# Patient Record
Sex: FEMALE | Race: OTHER | Employment: OTHER | URBAN - METROPOLITAN AREA
[De-identification: names, ages, dates, MRNs, and addresses within clinical notes are randomized per-mention and may not be internally consistent; named-entity substitution may affect disease eponyms.]

---

## 2017-01-03 ENCOUNTER — TELEPHONE (OUTPATIENT)
Dept: FAMILY MEDICINE CLINIC | Facility: CLINIC | Age: 77
End: 2017-01-03

## 2017-01-03 DIAGNOSIS — E11.9 TYPE 2 DIABETES MELLITUS WITHOUT COMPLICATION, WITHOUT LONG-TERM CURRENT USE OF INSULIN (HCC): Primary | ICD-10-CM

## 2017-01-03 NOTE — TELEPHONE ENCOUNTER
Copied from [address] [facility] & eEvent [id number]: Confirmed from pts' daughter Kathrine Vaughn pt needs freestyle lite test strips and send to THE [facility] mail delivery in [place], [place]. Freestyle test strips refilled for 3 months per [facility] refill protocol for bid testing.

## 2017-01-09 RX ORDER — PAROXETINE 10 MG/1
TABLET, FILM COATED ORAL
Qty: 90 TABLET | Refills: 0 | OUTPATIENT
Start: 2017-01-09

## 2017-01-09 RX ORDER — METOPROLOL SUCCINATE 50 MG/1
TABLET, EXTENDED RELEASE ORAL
Qty: 90 TABLET | Refills: 0 | OUTPATIENT
Start: 2017-01-09

## 2017-01-09 NOTE — TELEPHONE ENCOUNTER
Paroxetine and metoprolol approved by A on 12/21/16 for 3 month supply. Refill request denied at this time as refill too soon.

## 2017-01-16 ENCOUNTER — TELEPHONE (OUTPATIENT)
Dept: GASTROENTEROLOGY | Facility: CLINIC | Age: 77
End: 2017-01-16

## 2017-01-23 VITALS
DIASTOLIC BLOOD PRESSURE: 51 MMHG | TEMPERATURE: 97 F | BODY MASS INDEX: 24.19 KG/M2 | OXYGEN SATURATION: 97 % | WEIGHT: 120 LBS | HEART RATE: 59 BPM | RESPIRATION RATE: 18 BRPM | HEIGHT: 59 IN | SYSTOLIC BLOOD PRESSURE: 152 MMHG

## 2017-01-23 PROCEDURE — 93005 ELECTROCARDIOGRAM TRACING: CPT

## 2017-01-23 PROCEDURE — 93010 ELECTROCARDIOGRAM REPORT: CPT | Performed by: EMERGENCY MEDICINE

## 2017-01-24 ENCOUNTER — HOSPITAL ENCOUNTER (EMERGENCY)
Facility: HOSPITAL | Age: 77
Discharge: HOME | End: 2017-01-24
Payer: MEDICARE

## 2017-01-24 NOTE — ED INITIAL ASSESSMENT (HPI)
Near syncopal episode this evening- was caught by family member. C/O pressure to top of head and dizziness. Patient is A/O X4.

## 2017-01-26 ENCOUNTER — TELEPHONE (OUTPATIENT)
Dept: INTERNAL MEDICINE CLINIC | Facility: CLINIC | Age: 77
End: 2017-01-26

## 2017-01-26 NOTE — TELEPHONE ENCOUNTER
Spoke with daughter Sylvia Davis (Mahendra Bell verified)  who states in beginning of January mother was admitted to a hospital in [facility] due to a dizzy spell, stomach ache and nausea. She was diagnosed at that time with Colitis and treated with IV antibiotics.  States h

## 2017-01-26 NOTE — TELEPHONE ENCOUNTER
Attempted to call daughter back. [facility], please transfer to [id number] until 5pm today, otherwise [id number] anytime.

## 2017-01-26 NOTE — TELEPHONE ENCOUNTER
Spoke with the patient's daughter Vy Park who scheduled appt for next week and will obtain medical records from the hospital

## 2017-01-26 NOTE — TELEPHONE ENCOUNTER
----- Message from [address] [place] [address] [address] Generic sent at 1/24/2017  8:17 AM CST -----  Regarding: Other  Contact: [phone]  Marlee Maynard,  This is mustapha. Mom has not been feeling good .  she is complaining of stomach ache and she lost her balance twice yesterday sh

## 2017-02-03 ENCOUNTER — TELEPHONE (OUTPATIENT)
Dept: FAMILY MEDICINE CLINIC | Facility: CLINIC | Age: 77
End: 2017-02-03

## 2017-02-03 ENCOUNTER — APPOINTMENT (OUTPATIENT)
Dept: PHYSICAL THERAPY | Facility: HOSPITAL | Age: 77
End: 2017-02-03
Attending: FAMILY MEDICINE
Payer: MEDICARE

## 2017-02-03 NOTE — TELEPHONE ENCOUNTER
Daughter states pt has PT appt this afternoon at [facility]  Physical therapy dept said they do not have order  Daughter said there is  referral from Dr Ivanna Patrick- see 12/21 referral    Daughter would like callback to confirm order sent

## 2017-02-06 ENCOUNTER — PATIENT MESSAGE (OUTPATIENT)
Dept: FAMILY MEDICINE CLINIC | Facility: CLINIC | Age: 77
End: 2017-02-06

## 2017-02-06 DIAGNOSIS — Z91.81 RISK FOR FALLS: Primary | ICD-10-CM

## 2017-02-06 DIAGNOSIS — R29.898 MUSCULAR DECONDITIONING: ICD-10-CM

## 2017-02-07 ENCOUNTER — APPOINTMENT (OUTPATIENT)
Dept: PHYSICAL THERAPY | Facility: HOSPITAL | Age: 77
End: 2017-02-07
Attending: FAMILY MEDICINE
Payer: MEDICARE

## 2017-02-07 ENCOUNTER — TELEPHONE (OUTPATIENT)
Dept: PHYSICAL THERAPY | Facility: HOSPITAL | Age: 77
End: 2017-02-07

## 2017-02-07 NOTE — TELEPHONE ENCOUNTER
Spoke with nurse at Dr. Jhonatan Malik office regarding patient's recent ER visits for dizziness and falls and requested updated order with clearance to start PT as patient's current order is dated prior to ER visits.  Per nurse, Dr. Nain Huff would like to see patient

## 2017-02-07 NOTE — TELEPHONE ENCOUNTER
Per AMA it will be okay for the pt to start PT prior to the [address] on 2/22/17. Okay to use current PT referral. Daughter notified and PT office aware.  Pt is scheduled for 2/10/17 at [facility]

## 2017-02-10 ENCOUNTER — OFFICE VISIT (OUTPATIENT)
Dept: PHYSICAL THERAPY | Facility: HOSPITAL | Age: 77
End: 2017-02-10
Attending: FAMILY MEDICINE
Payer: MEDICARE

## 2017-02-10 DIAGNOSIS — Z91.81 RISK FOR FALLS: Primary | ICD-10-CM

## 2017-02-10 DIAGNOSIS — R29.898 MUSCULAR DECONDITIONING: ICD-10-CM

## 2017-02-10 DIAGNOSIS — S76.211A GROIN STRAIN, RIGHT, INITIAL ENCOUNTER: ICD-10-CM

## 2017-02-10 PROCEDURE — 97163 PT EVAL HIGH COMPLEX 45 MIN: CPT

## 2017-02-10 NOTE — PROGRESS NOTES
LOWER EXTREMITY EVALUATION:   Referring Physician: Dr. Lisa Addison  Date of Onset: 3-4 months ago Date of Service: 2/10/2017   Diagnosis: Risk for falls; muscular deconditioning     PATIENT SUMMARY:   Sunday Ramsey is a 77year old y/o female who presents to the with ambulation, sitting/rising from sitting, negotiating stairs, and performing household chores. Aneta Ridley would benefit from skilled Physical Therapy to address the above impairments to allow for improved functional mobility with decreased risk for falls. instruction    Education or treatment limitation: None  Rehab Potential:good    Current status G Code: Initial Mobility: Walking and Moving Around CK: 40-59% impaired, limited, or restricted  Goal status G Code:  Mobility: Walking and Moving Around CJ: 20-3

## 2017-02-10 NOTE — TELEPHONE ENCOUNTER
From: Elian Eng  To: Rumaldo Favre, MD  Sent: 2/6/2017 12:21 PM CST  Subject: Non-Urgent Medical Question    there was a new order request for the physical therapy on friday , and i was wondering if it was processed

## 2017-02-14 ENCOUNTER — OFFICE VISIT (OUTPATIENT)
Dept: PHYSICAL THERAPY | Facility: HOSPITAL | Age: 77
End: 2017-02-14
Attending: FAMILY MEDICINE
Payer: MEDICARE

## 2017-02-14 DIAGNOSIS — S76.211A GROIN STRAIN, RIGHT, INITIAL ENCOUNTER: ICD-10-CM

## 2017-02-14 DIAGNOSIS — Z91.81 RISK FOR FALLS: Primary | ICD-10-CM

## 2017-02-14 DIAGNOSIS — R29.898 MUSCULAR DECONDITIONING: ICD-10-CM

## 2017-02-14 PROCEDURE — 97110 THERAPEUTIC EXERCISES: CPT

## 2017-02-14 NOTE — PATIENT INSTRUCTIONS
1. Pt to perform home exercise program as instructed and notify therapist if any pain is experienced. 2. Pt to use lumbar support for sitting.

## 2017-02-14 NOTE — PROGRESS NOTES
Diagnosis: Risk for falls; muscular deconditioning     Authorized # of Visits:  2/7         Next MD visit: none scheduled  Fall Risk: standard         Precautions: n/a           Medication Changes since last visit?: No  Subjective: Pts son Hammadevelyn Rosy to interpr

## 2017-02-16 ENCOUNTER — OFFICE VISIT (OUTPATIENT)
Dept: PHYSICAL THERAPY | Facility: HOSPITAL | Age: 77
End: 2017-02-16
Attending: FAMILY MEDICINE
Payer: MEDICARE

## 2017-02-16 DIAGNOSIS — R29.898 MUSCULAR DECONDITIONING: ICD-10-CM

## 2017-02-16 DIAGNOSIS — S76.211A GROIN STRAIN, RIGHT, INITIAL ENCOUNTER: ICD-10-CM

## 2017-02-16 DIAGNOSIS — Z91.81 RISK FOR FALLS: Primary | ICD-10-CM

## 2017-02-16 PROCEDURE — 97110 THERAPEUTIC EXERCISES: CPT

## 2017-02-16 NOTE — PROGRESS NOTES
Diagnosis: Risk for falls; muscular deconditioning     Authorized # of Visits:  3/7         Next MD visit: none scheduled  Fall Risk: standard         Precautions: n/a           Medication Changes since last visit?: No  Subjective: Pts son Willis Dieudonne to interpr

## 2017-02-18 NOTE — TELEPHONE ENCOUNTER
Not sure about this  pls check with daughter  I believe she is already in Physical Therapy  I did see daughter for a vist recently and she mentioned mother getting Physical Therapy. Is this sorted out?

## 2017-02-21 ENCOUNTER — OFFICE VISIT (OUTPATIENT)
Dept: PHYSICAL THERAPY | Facility: HOSPITAL | Age: 77
End: 2017-02-21
Attending: FAMILY MEDICINE
Payer: MEDICARE

## 2017-02-21 DIAGNOSIS — S76.211A GROIN STRAIN, RIGHT, INITIAL ENCOUNTER: ICD-10-CM

## 2017-02-21 DIAGNOSIS — R29.898 MUSCULAR DECONDITIONING: ICD-10-CM

## 2017-02-21 DIAGNOSIS — Z91.81 RISK FOR FALLS: Primary | ICD-10-CM

## 2017-02-21 PROCEDURE — 97110 THERAPEUTIC EXERCISES: CPT

## 2017-02-22 ENCOUNTER — OFFICE VISIT (OUTPATIENT)
Dept: FAMILY MEDICINE CLINIC | Facility: CLINIC | Age: 77
End: 2017-02-22

## 2017-02-22 VITALS
DIASTOLIC BLOOD PRESSURE: 66 MMHG | SYSTOLIC BLOOD PRESSURE: 123 MMHG | WEIGHT: 116 LBS | RESPIRATION RATE: 18 BRPM | HEART RATE: 70 BPM | BODY MASS INDEX: 23 KG/M2 | TEMPERATURE: 97 F

## 2017-02-22 DIAGNOSIS — I10 ESSENTIAL HYPERTENSION WITH GOAL BLOOD PRESSURE LESS THAN 140/90: ICD-10-CM

## 2017-02-22 DIAGNOSIS — Z78.0 POSTMENOPAUSAL: ICD-10-CM

## 2017-02-22 DIAGNOSIS — E11.9 CONTROLLED TYPE 2 DIABETES MELLITUS WITHOUT COMPLICATION, WITHOUT LONG-TERM CURRENT USE OF INSULIN (HCC): Primary | ICD-10-CM

## 2017-02-22 DIAGNOSIS — Z91.81 RISK FOR FALLS: ICD-10-CM

## 2017-02-22 DIAGNOSIS — E78.00 HYPERCHOLESTEREMIA: ICD-10-CM

## 2017-02-22 DIAGNOSIS — M85.80 OSTEOPENIA, UNSPECIFIED LOCATION: ICD-10-CM

## 2017-02-22 PROCEDURE — 99214 OFFICE O/P EST MOD 30 MIN: CPT | Performed by: FAMILY MEDICINE

## 2017-02-22 PROCEDURE — G0463 HOSPITAL OUTPT CLINIC VISIT: HCPCS | Performed by: FAMILY MEDICINE

## 2017-02-22 RX ORDER — PRAVASTATIN SODIUM 40 MG
40 TABLET ORAL NIGHTLY
Qty: 90 TABLET | Refills: 1 | Status: SHIPPED | OUTPATIENT
Start: 2017-02-22 | End: 2017-09-27

## 2017-02-22 RX ORDER — LOSARTAN POTASSIUM 50 MG/1
50 TABLET ORAL 2 TIMES DAILY
Qty: 180 TABLET | Refills: 1 | Status: SHIPPED | OUTPATIENT
Start: 2017-02-22 | End: 2017-09-27

## 2017-02-22 RX ORDER — METOPROLOL SUCCINATE 50 MG/1
50 TABLET, EXTENDED RELEASE ORAL
Qty: 90 TABLET | Refills: 1 | Status: SHIPPED | OUTPATIENT
Start: 2017-02-22 | End: 2017-09-27

## 2017-02-22 RX ORDER — PAROXETINE 10 MG/1
10 TABLET, FILM COATED ORAL EVERY MORNING
Qty: 90 TABLET | Refills: 1 | Status: SHIPPED | OUTPATIENT
Start: 2017-02-22 | End: 2017-09-27

## 2017-02-23 ENCOUNTER — OFFICE VISIT (OUTPATIENT)
Dept: PHYSICAL THERAPY | Facility: HOSPITAL | Age: 77
End: 2017-02-23
Attending: FAMILY MEDICINE
Payer: MEDICARE

## 2017-02-23 DIAGNOSIS — R29.898 MUSCULAR DECONDITIONING: ICD-10-CM

## 2017-02-23 DIAGNOSIS — Z91.81 RISK FOR FALLS: Primary | ICD-10-CM

## 2017-02-23 DIAGNOSIS — S76.211A GROIN STRAIN, RIGHT, INITIAL ENCOUNTER: ICD-10-CM

## 2017-02-23 PROCEDURE — 97112 NEUROMUSCULAR REEDUCATION: CPT

## 2017-02-23 PROCEDURE — 97110 THERAPEUTIC EXERCISES: CPT

## 2017-02-23 NOTE — PROGRESS NOTES
HPI:    Patient ID: Kateryna Mitchell is a 77year old female. HPI Comments: Pt here for follow up. Diabetes- reports checking blood glucoses daily with readings ranging in the low 100's. Denies any highs or lows.  States she has changed eating habits, a daily with breakfast. Disp: 90 tablet Rfl: 0   PARoxetine HCl 10 MG Oral Tab Take 1 tablet (10 mg total) by mouth every morning. Disp: 90 tablet Rfl: 0   Metoprolol Succinate ER 50 MG Oral Tablet 24 Hr Take 1 tablet (50 mg total) by mouth once daily.  Disp: Instructed to keep feet clean and dry. 2. Essential hypertension with goal blood pressure less than 140/90  Bring in BP cuff to office to ensure reliability. Keep accurate record at home and if elevated recheck after taking medication.  Continue taking

## 2017-02-23 NOTE — TELEPHONE ENCOUNTER
Pt had OV with Dr. Samra Howard 2/22 and PT encounters noted this week. Issue appears resolved, closing encounter.

## 2017-02-23 NOTE — PROGRESS NOTES
Diagnosis: Risk for falls; muscular deconditioning     Authorized # of Visits:  5/7         Next MD visit: none scheduled  Fall Risk: standard         Precautions: n/a           Medication Changes since last visit?: No  Subjective: Patient reports no pain

## 2017-02-28 ENCOUNTER — OFFICE VISIT (OUTPATIENT)
Dept: PHYSICAL THERAPY | Facility: HOSPITAL | Age: 77
End: 2017-02-28
Attending: FAMILY MEDICINE
Payer: MEDICARE

## 2017-02-28 DIAGNOSIS — R29.898 MUSCULAR DECONDITIONING: ICD-10-CM

## 2017-02-28 DIAGNOSIS — Z91.81 RISK FOR FALLS: Primary | ICD-10-CM

## 2017-02-28 DIAGNOSIS — S76.211A GROIN STRAIN, RIGHT, INITIAL ENCOUNTER: ICD-10-CM

## 2017-02-28 PROCEDURE — 97112 NEUROMUSCULAR REEDUCATION: CPT

## 2017-02-28 PROCEDURE — 97110 THERAPEUTIC EXERCISES: CPT

## 2017-02-28 NOTE — PROGRESS NOTES
Patient Name: Rachid Segura: 5/10/1940, MRN: [id number]   Date:  2/28/2017  Referring Physician:  Ángela Mauricio    Diagnosis: Risk for falls     Discharge Summary    Pt has attended 6 visits in Physical Therapy.      Progress Note Start Date: 2/10/17 Dept: [phone].     Sincerely,  Shea Bonilla    Electronically signed by therapist: Shea Bonilla, PT         Diagnosis: Risk for falls; muscular deconditioning    Authorized # of Visits:  6/7         Next MD visit: none scheduled  Fall Risk: standard

## 2017-03-14 ENCOUNTER — HOSPITAL ENCOUNTER (OUTPATIENT)
Dept: BONE DENSITY | Facility: HOSPITAL | Age: 77
Discharge: HOME | End: 2017-03-14
Attending: FAMILY MEDICINE
Payer: MEDICARE

## 2017-03-14 DIAGNOSIS — Z78.0 POSTMENOPAUSAL: ICD-10-CM

## 2017-03-14 DIAGNOSIS — M85.80 OSTEOPENIA, UNSPECIFIED LOCATION: ICD-10-CM

## 2017-03-14 PROCEDURE — 77080 DXA BONE DENSITY AXIAL: CPT

## 2017-03-18 ENCOUNTER — APPOINTMENT (OUTPATIENT)
Dept: LAB | Facility: HOSPITAL | Age: 77
End: 2017-03-18
Attending: FAMILY MEDICINE
Payer: MEDICARE

## 2017-03-18 ENCOUNTER — HOSPITAL ENCOUNTER (OUTPATIENT)
Dept: GENERAL RADIOLOGY | Facility: HOSPITAL | Age: 77
Discharge: HOME | End: 2017-03-18
Attending: FAMILY MEDICINE
Payer: MEDICARE

## 2017-03-18 DIAGNOSIS — I10 ESSENTIAL HYPERTENSION WITH GOAL BLOOD PRESSURE LESS THAN 140/90: ICD-10-CM

## 2017-03-18 DIAGNOSIS — Z91.81 RISK FOR FALLS: ICD-10-CM

## 2017-03-18 DIAGNOSIS — S76.211A GROIN STRAIN, RIGHT, INITIAL ENCOUNTER: ICD-10-CM

## 2017-03-18 DIAGNOSIS — E78.00 HYPERCHOLESTEREMIA: ICD-10-CM

## 2017-03-18 DIAGNOSIS — E11.9 CONTROLLED TYPE 2 DIABETES MELLITUS WITHOUT COMPLICATION, WITHOUT LONG-TERM CURRENT USE OF INSULIN (HCC): ICD-10-CM

## 2017-03-18 LAB
ALT SERPL-CCNC: 28 U/L (ref 14–54)
ANION GAP SERPL CALC-SCNC: 7 MMOL/L (ref 0–18)
AST SERPL-CCNC: 31 U/L (ref 15–41)
BUN SERPL-MCNC: 5 MG/DL (ref 8–20)
BUN/CREAT SERPL: 6.3 (ref 10–20)
CALCIUM SERPL-MCNC: 9.1 MG/DL (ref 8.5–10.5)
CHLORIDE SERPL-SCNC: 105 MMOL/L (ref 95–110)
CHOLEST SERPL-MCNC: 149 MG/DL (ref 110–200)
CO2 SERPL-SCNC: 30 MMOL/L (ref 22–32)
CREAT SERPL-MCNC: 0.79 MG/DL (ref 0.5–1.5)
CREAT UR-MCNC: 138.2 MG/DL
GLUCOSE SERPL-MCNC: 109 MG/DL (ref 70–99)
HBA1C MFR BLD: 5.6 % (ref 4–6)
HDLC SERPL-MCNC: 40 MG/DL
LDLC SERPL CALC-MCNC: 71 MG/DL (ref 0–99)
MICROALBUMIN UR-MCNC: 3 MG/DL (ref 0–1.8)
MICROALBUMIN/CREAT UR: 21.7 MG/G{CREAT} (ref 0–20)
NONHDLC SERPL-MCNC: 109 MG/DL
OSMOLALITY UR CALC.SUM OF ELEC: 292 MOSM/KG (ref 275–295)
POTASSIUM SERPL-SCNC: 4 MMOL/L (ref 3.3–5.1)
SODIUM SERPL-SCNC: 142 MMOL/L (ref 136–144)
TRIGL SERPL-MCNC: 192 MG/DL (ref 1–149)

## 2017-03-18 PROCEDURE — 84460 ALANINE AMINO (ALT) (SGPT): CPT

## 2017-03-18 PROCEDURE — 80061 LIPID PANEL: CPT

## 2017-03-18 PROCEDURE — 84450 TRANSFERASE (AST) (SGOT): CPT

## 2017-03-18 PROCEDURE — 80048 BASIC METABOLIC PNL TOTAL CA: CPT

## 2017-03-18 PROCEDURE — 83036 HEMOGLOBIN GLYCOSYLATED A1C: CPT

## 2017-03-18 PROCEDURE — 36415 COLL VENOUS BLD VENIPUNCTURE: CPT

## 2017-03-18 PROCEDURE — 73502 X-RAY EXAM HIP UNI 2-3 VIEWS: CPT

## 2017-03-18 PROCEDURE — 82570 ASSAY OF URINE CREATININE: CPT

## 2017-03-18 PROCEDURE — 82043 UR ALBUMIN QUANTITATIVE: CPT

## 2017-03-20 PROBLEM — M81.0 OSTEOPOROSIS: Status: ACTIVE | Noted: 2017-03-20

## 2017-03-23 RX ORDER — PRAVASTATIN SODIUM 40 MG
TABLET ORAL
Qty: 90 TABLET | Refills: 1 | OUTPATIENT
Start: 2017-03-23

## 2017-03-23 NOTE — TELEPHONE ENCOUNTER
Cholesterol Medications  Protocol Criteria:  · Appointment scheduled in the past 12 months or in the next 3 months  · ALT & LDL on file in the past 12 months  · ALT result < 80  · LDL result <130   Recent Visits       Provider Department Primary Dx    4 we

## 2017-04-12 ENCOUNTER — OFFICE VISIT (OUTPATIENT)
Dept: FAMILY MEDICINE CLINIC | Facility: CLINIC | Age: 77
End: 2017-04-12

## 2017-04-12 VITALS
TEMPERATURE: 98 F | HEIGHT: 59 IN | SYSTOLIC BLOOD PRESSURE: 135 MMHG | HEART RATE: 77 BPM | BODY MASS INDEX: 22.98 KG/M2 | DIASTOLIC BLOOD PRESSURE: 68 MMHG | WEIGHT: 114 LBS

## 2017-04-12 DIAGNOSIS — M15.9 PRIMARY OSTEOARTHRITIS INVOLVING MULTIPLE JOINTS: ICD-10-CM

## 2017-04-12 DIAGNOSIS — M81.0 OSTEOPOROSIS, UNSPECIFIED OSTEOPOROSIS TYPE, UNSPECIFIED PATHOLOGICAL FRACTURE PRESENCE: ICD-10-CM

## 2017-04-12 DIAGNOSIS — E11.9 CONTROLLED TYPE 2 DIABETES MELLITUS WITHOUT COMPLICATION, WITHOUT LONG-TERM CURRENT USE OF INSULIN (HCC): ICD-10-CM

## 2017-04-12 DIAGNOSIS — R42 DIZZINESS: ICD-10-CM

## 2017-04-12 DIAGNOSIS — I10 ESSENTIAL HYPERTENSION WITH GOAL BLOOD PRESSURE LESS THAN 140/90: Primary | ICD-10-CM

## 2017-04-12 DIAGNOSIS — Z91.81 RISK FOR FALLS: ICD-10-CM

## 2017-04-12 PROBLEM — M15.0 PRIMARY OSTEOARTHRITIS INVOLVING MULTIPLE JOINTS: Status: ACTIVE | Noted: 2017-04-12

## 2017-04-12 PROCEDURE — 99214 OFFICE O/P EST MOD 30 MIN: CPT | Performed by: FAMILY MEDICINE

## 2017-04-12 PROCEDURE — G0463 HOSPITAL OUTPT CLINIC VISIT: HCPCS | Performed by: FAMILY MEDICINE

## 2017-04-12 NOTE — PROGRESS NOTES
HPI:    Patient ID: Baljit Isabel is a 77year old female. HPI    Patient here with son, Kike Jones, for follow up. Overall feels well. States sometimes her blood sugar is 200 in the morning. Usually  Alone in morning.     ccompleted Physical Therapy 8 s Disp: 100 each Rfl: 2   Blood Pressure Does not apply Kit With Adult cuff Disp: 1 kit Rfl: 0   aspirin 81 MG Oral Tab Take 1 tablet by mouth daily.  Disp:  Rfl:    Blood Glucose Monitoring Suppl (FREESTYLE LITE) Does not apply Device Freestyle lite monitor Visit:  No prescriptions requested or ordered in this encounter    Imaging & Referrals:  EXT DME CANE STRAIGHT  DME - EXTERNAL        CW#[id number]

## 2017-05-16 ENCOUNTER — OFFICE VISIT (OUTPATIENT)
Dept: GASTROENTEROLOGY | Facility: CLINIC | Age: 77
End: 2017-05-16

## 2017-05-16 VITALS
SYSTOLIC BLOOD PRESSURE: 129 MMHG | DIASTOLIC BLOOD PRESSURE: 75 MMHG | BODY MASS INDEX: 22.98 KG/M2 | HEIGHT: 59 IN | HEART RATE: 73 BPM | WEIGHT: 114 LBS

## 2017-05-16 DIAGNOSIS — Z12.11 COLON CANCER SCREENING: Primary | ICD-10-CM

## 2017-05-16 PROCEDURE — 99203 OFFICE O/P NEW LOW 30 MIN: CPT | Performed by: INTERNAL MEDICINE

## 2017-05-16 PROCEDURE — G0463 HOSPITAL OUTPT CLINIC VISIT: HCPCS | Performed by: INTERNAL MEDICINE

## 2017-05-17 ENCOUNTER — TELEPHONE (OUTPATIENT)
Dept: GASTROENTEROLOGY | Facility: CLINIC | Age: 77
End: 2017-05-17

## 2017-05-17 NOTE — TELEPHONE ENCOUNTER
Scheduled for:  Colonoscopy 01883  Provider Name: Dr Darrick Ma  Date:  Beth Kirk 7/20/17  Location:  [facility]  Sedation:  MAC  Time:  10:00 am  Prep: colyte  Meds/Allergies Reconciled?:  lisinopril  Diagnosis with codes:  Colon cancer screening Z12.11  Was patient info

## 2017-05-17 NOTE — PROGRESS NOTES
Sherif Wagner is a 69year old female. HPI:   Patient presents with:  Colonoscopy Screening: no prior colonoscopy       The patient is a 69year old female with a history of anxiety who presents for colon cancer screening.   She denies abdominal pain, ch Sodium 40 MG Oral Tab Take 1 tablet (40 mg total) by mouth nightly. Disp: 90 tablet Rfl: 1   PARoxetine HCl 10 MG Oral Tab Take 1 tablet (10 mg total) by mouth every morning.  Disp: 90 tablet Rfl: 1   Metoprolol Succinate ER 50 MG Oral Tablet 24 Hr Take 1 t confusion  . ASSESSMENT/PLAN:   Assessment  No diagnosis found. The patient is a 69year old female who appears to be of average risk for colon cancer. I discussed options for screening with the patient and her daughter who assisted in translation.   Lesli Robertson

## 2017-07-13 ENCOUNTER — TELEPHONE (OUTPATIENT)
Dept: FAMILY MEDICINE CLINIC | Facility: CLINIC | Age: 77
End: 2017-07-13

## 2017-07-13 NOTE — TELEPHONE ENCOUNTER
Pt scheduled an appt via [facility] with Radha Lay for 7/17 with these symptoms     Visit Type: [facility] (3062)      7/17/2017    6:15 PM  15 mins. DELILAH Santoro      [facility]      Patient Comments:   [facility] New Problem Visit   ear ache

## 2017-07-13 NOTE — TELEPHONE ENCOUNTER
Actions Requested: appt made 7/1717  Problem: ear pain  Onset and Timing: a few days  Associated Symptoms: moderate intermittent ear pain, crackling sounds in r ear  Aggravating by: nothing  Alleviated by: nothing  Triage Note: spoke with patient's angélica

## 2017-07-17 ENCOUNTER — OFFICE VISIT (OUTPATIENT)
Dept: FAMILY MEDICINE CLINIC | Facility: CLINIC | Age: 77
End: 2017-07-17

## 2017-07-17 VITALS
DIASTOLIC BLOOD PRESSURE: 66 MMHG | SYSTOLIC BLOOD PRESSURE: 149 MMHG | BODY MASS INDEX: 22.78 KG/M2 | HEART RATE: 64 BPM | HEIGHT: 59.75 IN | WEIGHT: 116 LBS | TEMPERATURE: 97 F

## 2017-07-17 DIAGNOSIS — E11.9 TYPE 2 DIABETES MELLITUS WITHOUT COMPLICATION, WITHOUT LONG-TERM CURRENT USE OF INSULIN (HCC): ICD-10-CM

## 2017-07-17 DIAGNOSIS — I10 ESSENTIAL HYPERTENSION: ICD-10-CM

## 2017-07-17 DIAGNOSIS — H93.11 TINNITUS, RIGHT: ICD-10-CM

## 2017-07-17 DIAGNOSIS — H65.91 RIGHT NON-SUPPURATIVE OTITIS MEDIA: Primary | ICD-10-CM

## 2017-07-17 DIAGNOSIS — H92.01 OTALGIA, RIGHT: ICD-10-CM

## 2017-07-17 PROBLEM — D69.6 THROMBOCYTOPENIA (HCC): Chronic | Status: ACTIVE | Noted: 2017-07-17

## 2017-07-17 PROBLEM — F33.0 MILD RECURRENT MAJOR DEPRESSION (HCC): Chronic | Status: ACTIVE | Noted: 2017-07-17

## 2017-07-17 PROBLEM — F33.0 MILD RECURRENT MAJOR DEPRESSION: Chronic | Status: ACTIVE | Noted: 2017-07-17

## 2017-07-17 PROBLEM — D69.6 THROMBOCYTOPENIA: Chronic | Status: ACTIVE | Noted: 2017-07-17

## 2017-07-17 PROCEDURE — 99213 OFFICE O/P EST LOW 20 MIN: CPT | Performed by: PHYSICIAN ASSISTANT

## 2017-07-17 PROCEDURE — G0463 HOSPITAL OUTPT CLINIC VISIT: HCPCS | Performed by: PHYSICIAN ASSISTANT

## 2017-07-17 RX ORDER — AZITHROMYCIN 250 MG/1
TABLET, FILM COATED ORAL
Qty: 6 TABLET | Refills: 0 | Status: SHIPPED | OUTPATIENT
Start: 2017-07-17 | End: 2017-09-27 | Stop reason: ALTCHOICE

## 2017-07-17 RX ORDER — BLOOD PRESSURE TEST KIT-MEDIUM
KIT MISCELLANEOUS
Qty: 1 EACH | Refills: 0 | Status: SHIPPED | OUTPATIENT
Start: 2017-07-17 | End: 2018-11-14

## 2017-07-17 RX ORDER — LANCETS 28 GAUGE
1 EACH MISCELLANEOUS DAILY
Qty: 100 EACH | Refills: 2 | Status: SHIPPED | OUTPATIENT
Start: 2017-07-17 | End: 2018-11-14

## 2017-07-18 NOTE — PROGRESS NOTES
HPI:    Patient ID: Mitra Espinoza is a 69year old female. Patient presents for pain in right ear on and off for past five months. She states now developed \"ticking\" sound in right ear only. She denies muffled hearing. She denies drainage from ear. morning. Disp: 90 tablet Rfl: 1   Metoprolol Succinate ER 50 MG Oral Tablet 24 Hr Take 1 tablet (50 mg total) by mouth once daily. Disp: 90 tablet Rfl: 1   Losartan Potassium 50 MG Oral Tab Take 1 tablet (50 mg total) by mouth 2 (two) times daily.  Disp: 18 persist.    Otalgia, right  Tinnitus, right  -Referral to ENT entered. Type 2 diabetes mellitus without complication, without long-term current use of insulin (hcc)  -Rx for Freestyle test strips and lancets sent to [address].     Essential hyperte

## 2017-07-20 ENCOUNTER — ANESTHESIA (OUTPATIENT)
Dept: ENDOSCOPY | Facility: HOSPITAL | Age: 77
End: 2017-07-20
Payer: MEDICARE

## 2017-07-20 ENCOUNTER — SURGERY (OUTPATIENT)
Age: 77
End: 2017-07-20

## 2017-07-20 ENCOUNTER — ANESTHESIA EVENT (OUTPATIENT)
Dept: ENDOSCOPY | Facility: HOSPITAL | Age: 77
End: 2017-07-20
Payer: MEDICARE

## 2017-07-20 ENCOUNTER — HOSPITAL ENCOUNTER (OUTPATIENT)
Facility: HOSPITAL | Age: 77
Setting detail: HOSPITAL OUTPATIENT SURGERY
Discharge: HOME | End: 2017-07-20
Attending: INTERNAL MEDICINE | Admitting: INTERNAL MEDICINE
Payer: MEDICARE

## 2017-07-20 DIAGNOSIS — K63.5 POLYP OF ASCENDING COLON: Primary | ICD-10-CM

## 2017-07-20 DIAGNOSIS — Z12.11 SPECIAL SCREENING FOR MALIGNANT NEOPLASM OF COLON: ICD-10-CM

## 2017-07-20 DIAGNOSIS — K64.8 INTERNAL HEMORRHOIDS: ICD-10-CM

## 2017-07-20 LAB — GLUCOSE BLDC GLUCOMTR-MCNC: 100 MG/DL (ref 70–99)

## 2017-07-20 PROCEDURE — 0DBK8ZX EXCISION OF ASCENDING COLON, VIA NATURAL OR ARTIFICIAL OPENING ENDOSCOPIC, DIAGNOSTIC: ICD-10-PCS | Performed by: INTERNAL MEDICINE

## 2017-07-20 PROCEDURE — 0DBH8ZX EXCISION OF CECUM, VIA NATURAL OR ARTIFICIAL OPENING ENDOSCOPIC, DIAGNOSTIC: ICD-10-PCS | Performed by: INTERNAL MEDICINE

## 2017-07-20 PROCEDURE — 45385 COLONOSCOPY W/LESION REMOVAL: CPT | Performed by: INTERNAL MEDICINE

## 2017-07-20 PROCEDURE — 45380 COLONOSCOPY AND BIOPSY: CPT | Performed by: INTERNAL MEDICINE

## 2017-07-20 PROCEDURE — 0DBN8ZX EXCISION OF SIGMOID COLON, VIA NATURAL OR ARTIFICIAL OPENING ENDOSCOPIC, DIAGNOSTIC: ICD-10-PCS | Performed by: INTERNAL MEDICINE

## 2017-07-20 RX ORDER — SODIUM CHLORIDE, SODIUM LACTATE, POTASSIUM CHLORIDE, CALCIUM CHLORIDE 600; 310; 30; 20 MG/100ML; MG/100ML; MG/100ML; MG/100ML
INJECTION, SOLUTION INTRAVENOUS CONTINUOUS PRN
Status: DISCONTINUED | OUTPATIENT
Start: 2017-07-20 | End: 2017-07-20 | Stop reason: SURG

## 2017-07-20 RX ORDER — NALOXONE HYDROCHLORIDE 0.4 MG/ML
80 INJECTION, SOLUTION INTRAMUSCULAR; INTRAVENOUS; SUBCUTANEOUS AS NEEDED
Status: CANCELLED | OUTPATIENT
Start: 2017-07-20 | End: 2017-07-20

## 2017-07-20 RX ORDER — SODIUM CHLORIDE, SODIUM LACTATE, POTASSIUM CHLORIDE, CALCIUM CHLORIDE 600; 310; 30; 20 MG/100ML; MG/100ML; MG/100ML; MG/100ML
INJECTION, SOLUTION INTRAVENOUS CONTINUOUS
Status: CANCELLED | OUTPATIENT
Start: 2017-07-20

## 2017-07-20 RX ORDER — SODIUM CHLORIDE, SODIUM LACTATE, POTASSIUM CHLORIDE, CALCIUM CHLORIDE 600; 310; 30; 20 MG/100ML; MG/100ML; MG/100ML; MG/100ML
INJECTION, SOLUTION INTRAVENOUS CONTINUOUS
Status: DISCONTINUED | OUTPATIENT
Start: 2017-07-20 | End: 2017-07-20

## 2017-07-20 RX ORDER — LIDOCAINE HYDROCHLORIDE 10 MG/ML
INJECTION, SOLUTION EPIDURAL; INFILTRATION; INTRACAUDAL; PERINEURAL AS NEEDED
Status: DISCONTINUED | OUTPATIENT
Start: 2017-07-20 | End: 2017-07-20 | Stop reason: SURG

## 2017-07-20 RX ADMIN — SODIUM CHLORIDE, SODIUM LACTATE, POTASSIUM CHLORIDE, CALCIUM CHLORIDE: 600; 310; 30; 20 INJECTION, SOLUTION INTRAVENOUS at 10:15:00

## 2017-07-20 RX ADMIN — LIDOCAINE HYDROCHLORIDE 25 MG: 10 INJECTION, SOLUTION EPIDURAL; INFILTRATION; INTRACAUDAL; PERINEURAL at 09:57:00

## 2017-07-20 RX ADMIN — SODIUM CHLORIDE, SODIUM LACTATE, POTASSIUM CHLORIDE, CALCIUM CHLORIDE: 600; 310; 30; 20 INJECTION, SOLUTION INTRAVENOUS at 09:52:00

## 2017-07-20 NOTE — ANESTHESIA PREPROCEDURE EVALUATION
Anesthesia PreOp Note    HPI:     David Hannah is a 69year old female who presents for preoperative consultation requested by: Bud Persaud MD    Date of Surgery: 7/20/2017    Procedure(s):  COLONOSCOPY  Indication: Special screening for malignant n essential hypertension        Past Surgical History:  1/11/16: CATARACT EXTRACTION W/  INTRAOCULAR LENS IMPLA* Left      Comment: [facility]  2/1/16: CATARACT EXTRACTION W/  INTRAOCULAR LENS IMPLA* Right      Comment: [facility]  1980: HYSTERECTOMY      Prescriptions Pr ONCE IN EVENING Disp: 100 strip Rfl: 1 Taking       Current Facility-Administered Medications Ordered in Epic:  lactated ringers infusion  Intravenous Continuous Patricia Acevedo MD     No current [facility]-ordered outpatient prescriptions on file.       Modesta Rice Abdomen: soft.              Anesthesia Plan:   ASA:  2  Plan:   MAC  Plan Comments: Denies chest pain/SOB  Informed Consent Plan and Risks Discussed With:  Patient  Discussed plan with:  CRNA and surgeon      I have informed David Hannah  of the nature

## 2017-07-20 NOTE — ANESTHESIA POSTPROCEDURE EVALUATION
Patient: Zoe Baig    Procedure Summary     Date:  07/20/17 Room / Location:  [facility] ENDOSCOPY 05 / [facility] ENDOSCOPY    Anesthesia Start:  5419 Anesthesia Stop:  8908    Procedure:  COLONOSCOPY (N/A ) Diagnosis:       Special screening for malignant neoplasm

## 2017-07-20 NOTE — H&P
History & Physical Examination    Patient Name: José Diaz  MRN: [id number]  [facility]: [id number]  YOB: 1940    Diagnosis: colon screening        Prescriptions Prior to Admission:  Glucose Blood (FREESTYLE LITE TEST) In Vitro Strip TEST BLOOD S Medications:  lactated ringers infusion  Intravenous Continuous       Allergies:   Lisinopril              Coughing    Past Medical History:   Diagnosis Date   • Age-related nuclear cataract of both eyes 11/12/2015   • Anxiety state, unspecified    • CVA (

## 2017-07-20 NOTE — OPERATIVE REPORT
[facility] HOSP - [facility] Endoscopy Report      Preoperative Diagnosis:  - colon cancer screening      Postoperative Diagnosis:  - colon polyps x 4  - internal hemorrhoids      Procedure:    Colonoscopy         Surgeon:  Coreen Coats M.D.     Anesthesia:

## 2017-07-21 VITALS
WEIGHT: 118 LBS | HEART RATE: 62 BPM | SYSTOLIC BLOOD PRESSURE: 164 MMHG | BODY MASS INDEX: 23.79 KG/M2 | DIASTOLIC BLOOD PRESSURE: 69 MMHG | RESPIRATION RATE: 12 BRPM | OXYGEN SATURATION: 100 % | HEIGHT: 59 IN

## 2017-07-26 ENCOUNTER — TELEPHONE (OUTPATIENT)
Dept: GASTROENTEROLOGY | Facility: CLINIC | Age: 77
End: 2017-07-26

## 2017-09-01 ENCOUNTER — HOSPITAL ENCOUNTER (EMERGENCY)
Facility: HOSPITAL | Age: 77
Discharge: HOME | End: 2017-09-01
Attending: EMERGENCY MEDICINE
Payer: MEDICARE

## 2017-09-01 ENCOUNTER — APPOINTMENT (OUTPATIENT)
Dept: CT IMAGING | Facility: HOSPITAL | Age: 77
End: 2017-09-01
Attending: EMERGENCY MEDICINE
Payer: MEDICARE

## 2017-09-01 VITALS
TEMPERATURE: 98 F | OXYGEN SATURATION: 98 % | HEART RATE: 91 BPM | DIASTOLIC BLOOD PRESSURE: 56 MMHG | RESPIRATION RATE: 16 BRPM | SYSTOLIC BLOOD PRESSURE: 139 MMHG

## 2017-09-01 DIAGNOSIS — I10 HYPERTENSION, UNCONTROLLED: Primary | ICD-10-CM

## 2017-09-01 DIAGNOSIS — G44.009 CLUSTER HEADACHE, NOT INTRACTABLE, UNSPECIFIED CHRONICITY PATTERN: ICD-10-CM

## 2017-09-01 LAB
ANION GAP SERPL CALC-SCNC: 11 MMOL/L (ref 0–18)
BASOPHILS # BLD: 0 K/UL (ref 0–0.2)
BASOPHILS NFR BLD: 1 %
BUN SERPL-MCNC: 6 MG/DL (ref 8–20)
BUN/CREAT SERPL: 6.1 (ref 10–20)
CALCIUM SERPL-MCNC: 9.1 MG/DL (ref 8.5–10.5)
CHLORIDE SERPL-SCNC: 102 MMOL/L (ref 95–110)
CO2 SERPL-SCNC: 24 MMOL/L (ref 22–32)
CREAT SERPL-MCNC: 0.99 MG/DL (ref 0.5–1.5)
EOSINOPHIL # BLD: 0.3 K/UL (ref 0–0.7)
EOSINOPHIL NFR BLD: 8 %
ERYTHROCYTE [DISTWIDTH] IN BLOOD BY AUTOMATED COUNT: 12.8 % (ref 11–15)
GLUCOSE SERPL-MCNC: 231 MG/DL (ref 70–99)
HCT VFR BLD AUTO: 43.1 % (ref 35–48)
HGB BLD-MCNC: 15.1 G/DL (ref 12–16)
LYMPHOCYTES # BLD: 1.5 K/UL (ref 1–4)
LYMPHOCYTES NFR BLD: 36 %
MCH RBC QN AUTO: 30.5 PG (ref 27–32)
MCHC RBC AUTO-ENTMCNC: 34.9 G/DL (ref 32–37)
MCV RBC AUTO: 87.3 FL (ref 80–100)
MONOCYTES # BLD: 0.2 K/UL (ref 0–1)
MONOCYTES NFR BLD: 6 %
NEUTROPHILS # BLD AUTO: 2 K/UL (ref 1.8–7.7)
NEUTROPHILS NFR BLD: 50 %
OSMOLALITY UR CALC.SUM OF ELEC: 289 MOSM/KG (ref 275–295)
PLATELET # BLD AUTO: 137 K/UL (ref 140–400)
PMV BLD AUTO: 8.9 FL (ref 7.4–10.3)
POTASSIUM SERPL-SCNC: 3.4 MMOL/L (ref 3.3–5.1)
RBC # BLD AUTO: 4.94 M/UL (ref 3.7–5.4)
SODIUM SERPL-SCNC: 137 MMOL/L (ref 136–144)
WBC # BLD AUTO: 4.1 K/UL (ref 4–11)

## 2017-09-01 PROCEDURE — 93005 ELECTROCARDIOGRAM TRACING: CPT

## 2017-09-01 PROCEDURE — 36415 COLL VENOUS BLD VENIPUNCTURE: CPT

## 2017-09-01 PROCEDURE — 85025 COMPLETE CBC W/AUTO DIFF WBC: CPT | Performed by: EMERGENCY MEDICINE

## 2017-09-01 PROCEDURE — 80048 BASIC METABOLIC PNL TOTAL CA: CPT | Performed by: EMERGENCY MEDICINE

## 2017-09-01 PROCEDURE — 70450 CT HEAD/BRAIN W/O DYE: CPT | Performed by: EMERGENCY MEDICINE

## 2017-09-01 PROCEDURE — 99285 EMERGENCY DEPT VISIT HI MDM: CPT

## 2017-09-01 PROCEDURE — 93010 ELECTROCARDIOGRAM REPORT: CPT | Performed by: EMERGENCY MEDICINE

## 2017-09-01 NOTE — ED INITIAL ASSESSMENT (HPI)
Pt arrived via medics to rm 22 with 20g left ac sl for complaint of headache.   History of high blood pressure

## 2017-09-01 NOTE — ED PROVIDER NOTES
Patient Seen in: [facility] AND [facility] Emergency Department    History   Patient presents with:  Headache (neurologic)      HPI    The patient presents to the ED complaining of a frontal throbbing headache that started this morning as well as mild dizziness Number of children:               Social History Main Topics    Smoking status: Never Smoker                                                                Smokeless tobacco: Never Used                        Alcohol use:  No              Drug us Abdominal: Soft. She exhibits no distension. There is no tenderness. Musculoskeletal: She exhibits no edema or deformity. Neurological: She is alert and oriented to person, place, and time. She has normal strength.  No cranial nerve deficit or sensory 165/61 159/66 139/56   Pulse: 69 75  91   Resp: 18 18  16   Temp: 98.2 °F (36.8 °C)      TempSrc: Temporal      SpO2: 97% 97%  98%     *I personally reviewed and interpreted all ED vitals.     Pulse Ox: 98%, Room air, Normal     Monitor Interpretation:   no intractable, unspecified chronicity pattern    Disposition:  Discharge    Follow-up:  [facility]  [address] [address]  [phone]    Schedule an appointment as soon as possible for a visit in 3 days        Medications Prescribed:

## 2017-09-27 ENCOUNTER — OFFICE VISIT (OUTPATIENT)
Dept: FAMILY MEDICINE CLINIC | Facility: CLINIC | Age: 77
End: 2017-09-27

## 2017-09-27 VITALS
DIASTOLIC BLOOD PRESSURE: 59 MMHG | WEIGHT: 118 LBS | BODY MASS INDEX: 23.79 KG/M2 | SYSTOLIC BLOOD PRESSURE: 163 MMHG | HEIGHT: 59 IN | TEMPERATURE: 98 F | HEART RATE: 55 BPM

## 2017-09-27 DIAGNOSIS — E11.9 DIABETES MELLITUS TYPE 2 WITHOUT RETINOPATHY (HCC): ICD-10-CM

## 2017-09-27 DIAGNOSIS — Z23 NEED FOR INFLUENZA VACCINATION: ICD-10-CM

## 2017-09-27 DIAGNOSIS — M26.621 ARTHRALGIA OF RIGHT TEMPOROMANDIBULAR JOINT: ICD-10-CM

## 2017-09-27 DIAGNOSIS — I10 ESSENTIAL HYPERTENSION WITH GOAL BLOOD PRESSURE LESS THAN 140/90: Primary | ICD-10-CM

## 2017-09-27 DIAGNOSIS — Z86.73 HISTORY OF CVA (CEREBROVASCULAR ACCIDENT): ICD-10-CM

## 2017-09-27 DIAGNOSIS — E78.00 HYPERCHOLESTEREMIA: ICD-10-CM

## 2017-09-27 PROCEDURE — G0008 ADMIN INFLUENZA VIRUS VAC: HCPCS | Performed by: FAMILY MEDICINE

## 2017-09-27 PROCEDURE — 90653 IIV ADJUVANT VACCINE IM: CPT | Performed by: FAMILY MEDICINE

## 2017-09-27 PROCEDURE — 99214 OFFICE O/P EST MOD 30 MIN: CPT | Performed by: FAMILY MEDICINE

## 2017-09-27 PROCEDURE — G0463 HOSPITAL OUTPT CLINIC VISIT: HCPCS | Performed by: FAMILY MEDICINE

## 2017-09-27 RX ORDER — PAROXETINE 10 MG/1
TABLET, FILM COATED ORAL
Qty: 90 TABLET | Refills: 1 | Status: CANCELLED | OUTPATIENT
Start: 2017-09-27

## 2017-09-27 RX ORDER — PAROXETINE 10 MG/1
10 TABLET, FILM COATED ORAL EVERY MORNING
Qty: 90 TABLET | Refills: 1 | Status: SHIPPED | OUTPATIENT
Start: 2017-09-27 | End: 2018-01-09

## 2017-09-27 RX ORDER — LOSARTAN POTASSIUM 50 MG/1
TABLET ORAL
Qty: 180 TABLET | Refills: 1 | Status: CANCELLED | OUTPATIENT
Start: 2017-09-27

## 2017-09-27 RX ORDER — METOPROLOL SUCCINATE 50 MG/1
50 TABLET, EXTENDED RELEASE ORAL
Qty: 90 TABLET | Refills: 1 | Status: SHIPPED | OUTPATIENT
Start: 2017-09-27 | End: 2017-10-12 | Stop reason: CLARIF

## 2017-09-27 RX ORDER — METOPROLOL SUCCINATE 50 MG/1
TABLET, EXTENDED RELEASE ORAL
Qty: 90 TABLET | Refills: 1 | Status: CANCELLED | OUTPATIENT
Start: 2017-09-27

## 2017-09-27 RX ORDER — LOSARTAN POTASSIUM 50 MG/1
50 TABLET ORAL 2 TIMES DAILY
Qty: 180 TABLET | Refills: 1 | Status: SHIPPED | OUTPATIENT
Start: 2017-09-27 | End: 2017-09-27

## 2017-09-27 RX ORDER — PRAVASTATIN SODIUM 40 MG
40 TABLET ORAL NIGHTLY
Qty: 90 TABLET | Refills: 1 | Status: SHIPPED | OUTPATIENT
Start: 2017-09-27 | End: 2018-03-02

## 2017-09-27 RX ORDER — LOSARTAN POTASSIUM 50 MG/1
50 TABLET ORAL 2 TIMES DAILY
Qty: 180 TABLET | Refills: 1 | Status: SHIPPED | OUTPATIENT
Start: 2017-09-27 | End: 2018-03-02

## 2017-09-27 RX ORDER — DIPHENHYDRAMINE HCL 25 MG
TABLET ORAL
COMMUNITY
Start: 2017-08-01 | End: 2018-11-14

## 2017-09-27 RX ORDER — PRAVASTATIN SODIUM 40 MG
TABLET ORAL
Qty: 90 TABLET | Refills: 1 | Status: CANCELLED | OUTPATIENT
Start: 2017-09-27

## 2017-09-27 NOTE — PROGRESS NOTES
HPI:    Patient ID: Chino Jackson is a 69year old female. Diabetes   She presents for her follow-up diabetic visit. She has type 2 diabetes mellitus. Pertinent negatives for hypoglycemia include no dizziness, headaches or nervousness/anxiousness.  Gio Temp 97.8 °F (36.6 °C) (Oral)   Ht 4' 11\" (1.499 m)   Wt 118 lb (53.5 kg)   BMI 23.83 kg/m²          Current Outpatient Prescriptions:  Pravastatin Sodium 40 MG Oral Tab Take 1 tablet (40 mg total) by mouth nightly.  Disp: 90 tablet Rfl: 1   PARoxetine HCl supple. No thyromegaly present. Cardiovascular: Normal rate and regular rhythm. Pulmonary/Chest: Effort normal and breath sounds normal.   Abdominal: Soft. Bowel sounds are normal. There is no tenderness. Musculoskeletal: She exhibits no edema.    Reyna Gonzalez Lipid Panel [E]      Fluad High Dose 72 ys and older    Meds This Visit:  Signed Prescriptions Disp Refills    Pravastatin Sodium 40 MG Oral Tab 90 tablet 1      Sig: Take 1 tablet (40 mg total) by mouth nightly.       PARoxetine HCl 10 MG Oral Tab 90 table

## 2017-09-28 NOTE — TELEPHONE ENCOUNTER
Metformin refill request   please advise as does not meet RN protocol for refill criteria - listed as historical only    Diabetes Medications  Protocol Criteria:  · Appointment scheduled in the past 6 months or the next 3 months  · A1C < 7.5 in the past 6

## 2017-10-01 ENCOUNTER — APPOINTMENT (OUTPATIENT)
Dept: LAB | Facility: HOSPITAL | Age: 77
End: 2017-10-01
Attending: FAMILY MEDICINE
Payer: MEDICARE

## 2017-10-01 DIAGNOSIS — E11.9 DIABETES MELLITUS TYPE 2 WITHOUT RETINOPATHY (HCC): ICD-10-CM

## 2017-10-01 DIAGNOSIS — E78.00 HYPERCHOLESTEREMIA: ICD-10-CM

## 2017-10-01 DIAGNOSIS — I10 ESSENTIAL HYPERTENSION WITH GOAL BLOOD PRESSURE LESS THAN 140/90: ICD-10-CM

## 2017-10-01 PROCEDURE — 80061 LIPID PANEL: CPT

## 2017-10-01 PROCEDURE — 83036 HEMOGLOBIN GLYCOSYLATED A1C: CPT

## 2017-10-01 PROCEDURE — 80053 COMPREHEN METABOLIC PANEL: CPT

## 2017-10-01 PROCEDURE — 36415 COLL VENOUS BLD VENIPUNCTURE: CPT

## 2017-10-01 PROCEDURE — 84443 ASSAY THYROID STIM HORMONE: CPT

## 2017-10-12 ENCOUNTER — OFFICE VISIT (OUTPATIENT)
Dept: FAMILY MEDICINE CLINIC | Facility: CLINIC | Age: 77
End: 2017-10-12

## 2017-10-12 VITALS
DIASTOLIC BLOOD PRESSURE: 67 MMHG | SYSTOLIC BLOOD PRESSURE: 130 MMHG | WEIGHT: 116 LBS | HEART RATE: 60 BPM | HEIGHT: 59 IN | BODY MASS INDEX: 23.39 KG/M2 | TEMPERATURE: 98 F

## 2017-10-12 DIAGNOSIS — I10 ESSENTIAL HYPERTENSION WITH GOAL BLOOD PRESSURE LESS THAN 140/90: Primary | ICD-10-CM

## 2017-10-12 PROCEDURE — 99213 OFFICE O/P EST LOW 20 MIN: CPT | Performed by: FAMILY MEDICINE

## 2017-10-12 PROCEDURE — G0463 HOSPITAL OUTPT CLINIC VISIT: HCPCS | Performed by: FAMILY MEDICINE

## 2017-10-12 RX ORDER — GLUCOSAMINE HCL 500 MG
3000 TABLET ORAL DAILY
COMMUNITY

## 2017-10-12 RX ORDER — CALCIUM POLYCARBOPHIL 625 MG
TABLET ORAL
COMMUNITY
End: 2017-12-13

## 2017-10-12 RX ORDER — METOPROLOL SUCCINATE 100 MG/1
100 TABLET, EXTENDED RELEASE ORAL DAILY
Qty: 90 TABLET | Refills: 3 | Status: SHIPPED | OUTPATIENT
Start: 2017-10-12 | End: 2018-06-03

## 2017-10-13 NOTE — PROGRESS NOTES
Seth Xavier is a 69year old female. HPI:   Patient presents for recheck of her hypertension. Pt has been taking medications as instructed, no medication side effects,     She is here with her son today. Appears less stressed.       Wt Readings from (two) times daily. Disp: 180 tablet Rfl: 1   Glucose Blood (FREESTYLE LITE TEST) In Vitro Strip TEST BLOOD SUGAR TWICE DAILY ONCE IN THE MORNING BEFORE FOOD AND ONCE IN THE EVENING. Three months supply.  Disp: 200 strip Rfl: 2   FREESTYLE LANCETS Does not a Smokeless tobacco: Never Used                      Alcohol use:  No                  REVIEW OF SYSTEMS:   GENERAL HEALTH: feels well otherwise  SKIN: denies any unusual skin lesions or rashes  RESPIRATORY: denies shortness of breath with exertion  CARDI

## 2017-11-10 ENCOUNTER — PATIENT OUTREACH (OUTPATIENT)
Dept: CASE MANAGEMENT | Age: 77
End: 2017-11-10

## 2017-11-10 NOTE — PROGRESS NOTES
Outreached to patient in regards to enrollment to Chronic Care Management program, spoke to daughter Regina Harrison (patient only speaks [address]). Regina Harrison stated this program sounds wonderful but patient will not benefit due to language barrier.  Informed daughter I w

## 2017-12-06 ENCOUNTER — OFFICE VISIT (OUTPATIENT)
Dept: FAMILY MEDICINE CLINIC | Facility: CLINIC | Age: 77
End: 2017-12-06

## 2017-12-06 VITALS
TEMPERATURE: 97 F | WEIGHT: 117 LBS | BODY MASS INDEX: 23.59 KG/M2 | SYSTOLIC BLOOD PRESSURE: 164 MMHG | HEART RATE: 50 BPM | HEIGHT: 59 IN | DIASTOLIC BLOOD PRESSURE: 63 MMHG

## 2017-12-06 DIAGNOSIS — R68.84 JAW PAIN: Primary | ICD-10-CM

## 2017-12-06 PROCEDURE — G0463 HOSPITAL OUTPT CLINIC VISIT: HCPCS | Performed by: PHYSICIAN ASSISTANT

## 2017-12-06 PROCEDURE — 99213 OFFICE O/P EST LOW 20 MIN: CPT | Performed by: PHYSICIAN ASSISTANT

## 2017-12-06 RX ORDER — ACETAMINOPHEN 500 MG
500 TABLET ORAL AS NEEDED
Status: ON HOLD | COMMUNITY
Start: 2017-10-15 | End: 2018-11-21

## 2017-12-06 RX ORDER — DOCUSATE SODIUM 100 MG/1
100 CAPSULE, LIQUID FILLED ORAL 2 TIMES DAILY PRN
Qty: 30 CAPSULE | Refills: 1 | Status: SHIPPED | OUTPATIENT
Start: 2017-12-06 | End: 2018-11-14

## 2017-12-06 RX ORDER — METOPROLOL SUCCINATE 50 MG/1
TABLET, EXTENDED RELEASE ORAL
COMMUNITY
Start: 2017-09-29 | End: 2017-12-13 | Stop reason: DRUGHIGH

## 2017-12-06 RX ORDER — ALENDRONATE SODIUM 70 MG/1
TABLET ORAL
COMMUNITY
Start: 2017-12-01 | End: 2018-02-16

## 2017-12-06 RX ORDER — SULFAMETHOXAZOLE AND TRIMETHOPRIM 800; 160 MG/1; MG/1
1 TABLET ORAL 2 TIMES DAILY
Qty: 14 TABLET | Refills: 0 | Status: SHIPPED | OUTPATIENT
Start: 2017-12-06 | End: 2018-07-11 | Stop reason: ALTCHOICE

## 2017-12-06 NOTE — PROGRESS NOTES
HPI:    Patient ID: Preston Mora is a 69year old female. Patient presents with her daughter for pain anterior to right ear and in right jaw for past week. This has been a recurrent issue for her.   She denies any tinnitus, hearing loss, facial swellin 40 MG Oral Tab Take 1 tablet (40 mg total) by mouth nightly. Disp: 90 tablet Rfl: 1   PARoxetine HCl 10 MG Oral Tab Take 1 tablet (10 mg total) by mouth every morning.  Disp: 90 tablet Rfl: 1   Losartan Potassium 50 MG Oral Tab Take 1 tablet (50 mg total) b no cervical adenopathy. Neurological: She is alert and oriented to person, place, and time. No cranial nerve deficit. Skin: Skin is warm and dry. Psychiatric: She has a normal mood and affect. Vitals reviewed.              ASSESSMENT/PLAN:   Sanya stewart

## 2017-12-11 ENCOUNTER — NURSE TRIAGE (OUTPATIENT)
Dept: FAMILY MEDICINE CLINIC | Facility: CLINIC | Age: 77
End: 2017-12-11

## 2017-12-11 NOTE — TELEPHONE ENCOUNTER
Pt's daughter Lorena Diallo calling states mother's BP is running high and currently 199/90. Unable to make appt with Dr. Samra Howard reached out to Triage for assistance.

## 2017-12-11 NOTE — TELEPHONE ENCOUNTER
Action Requested: Summary for Provider     []  Critical Lab, Recommendations Needed  [] Need Additional Advice  [x]   FYI    []   Need Orders  [] Need Medications Sent to Pharmacy  []  Other     SUMMARY: Daughter taking patient to [facility] now.

## 2017-12-12 ENCOUNTER — HOSPITAL ENCOUNTER (EMERGENCY)
Facility: HOSPITAL | Age: 77
Discharge: HOME | End: 2017-12-12
Attending: EMERGENCY MEDICINE
Payer: MEDICARE

## 2017-12-12 VITALS
OXYGEN SATURATION: 98 % | HEART RATE: 54 BPM | DIASTOLIC BLOOD PRESSURE: 97 MMHG | BODY MASS INDEX: 24.56 KG/M2 | TEMPERATURE: 98 F | SYSTOLIC BLOOD PRESSURE: 157 MMHG | HEIGHT: 58 IN | RESPIRATION RATE: 20 BRPM | WEIGHT: 117 LBS

## 2017-12-12 DIAGNOSIS — R03.0 ELEVATED BLOOD PRESSURE READING: Primary | ICD-10-CM

## 2017-12-12 PROCEDURE — 93005 ELECTROCARDIOGRAM TRACING: CPT

## 2017-12-12 PROCEDURE — 93010 ELECTROCARDIOGRAM REPORT: CPT | Performed by: EMERGENCY MEDICINE

## 2017-12-12 PROCEDURE — 99284 EMERGENCY DEPT VISIT MOD MDM: CPT

## 2017-12-12 PROCEDURE — 85025 COMPLETE CBC W/AUTO DIFF WBC: CPT | Performed by: EMERGENCY MEDICINE

## 2017-12-12 PROCEDURE — 36415 COLL VENOUS BLD VENIPUNCTURE: CPT

## 2017-12-12 PROCEDURE — 84484 ASSAY OF TROPONIN QUANT: CPT | Performed by: EMERGENCY MEDICINE

## 2017-12-12 PROCEDURE — 80048 BASIC METABOLIC PNL TOTAL CA: CPT | Performed by: EMERGENCY MEDICINE

## 2017-12-12 NOTE — ED PROVIDER NOTES
Patient Seen in: [facility] Emergency Department    History   Patient presents with:  Hypertension (cardiovascular)    Stated Complaint: abdominal pain    HPI    81-year-old female with history of hypertension, hypercholesterolemia, prior CVA, and air)    Current:/97   Pulse 54   Temp 98.2 °F (36.8 °C) (Oral)   Resp 20   Ht 147.3 cm (4' 10\")   Wt 53.1 kg   SpO2 98%   BMI 24.45 kg/m²         Physical Exam    General Appearance: alert, no distress  Eyes: pupils equal and round no pallor or inje Normal  Reading: Normal EKG           ED Course as of Dec 12 1751  ------------------------------------------------------------       MDM     Lab and EKG results noted. Patient stable and without complaints throughout ED stay.   Will discharge the patient

## 2017-12-12 NOTE — ED INITIAL ASSESSMENT (HPI)
Pt states high blood pressure since yesterday- taking medications as prescribed for HTN- + dizziness and nausea. Denies vomiting. Steady gait observed. CSS negative.

## 2017-12-12 NOTE — TELEPHONE ENCOUNTER
Received call from patient's daughter, Shay Baird, who is on patient's HIPAA. Daughter confirmed she did not take patient to the immediate care center because her blood pressure had gone down after taking the blood pressure medication.  Daughter reports that pat

## 2017-12-13 ENCOUNTER — OFFICE VISIT (OUTPATIENT)
Dept: FAMILY MEDICINE CLINIC | Facility: CLINIC | Age: 77
End: 2017-12-13

## 2017-12-13 VITALS
HEIGHT: 58 IN | BODY MASS INDEX: 24.56 KG/M2 | WEIGHT: 117 LBS | HEART RATE: 56 BPM | SYSTOLIC BLOOD PRESSURE: 102 MMHG | DIASTOLIC BLOOD PRESSURE: 59 MMHG

## 2017-12-13 DIAGNOSIS — R09.81 NASAL CONGESTION: ICD-10-CM

## 2017-12-13 DIAGNOSIS — I10 ESSENTIAL HYPERTENSION WITH GOAL BLOOD PRESSURE LESS THAN 140/90: Primary | ICD-10-CM

## 2017-12-13 DIAGNOSIS — N28.9 DECREASED RENAL FUNCTION: ICD-10-CM

## 2017-12-13 DIAGNOSIS — I99.8 FLUCTUATING BLOOD PRESSURE: ICD-10-CM

## 2017-12-13 PROCEDURE — G0463 HOSPITAL OUTPT CLINIC VISIT: HCPCS | Performed by: FAMILY MEDICINE

## 2017-12-13 PROCEDURE — 99214 OFFICE O/P EST MOD 30 MIN: CPT | Performed by: FAMILY MEDICINE

## 2017-12-13 RX ORDER — LORATADINE 10 MG/1
10 TABLET ORAL NIGHTLY
Qty: 30 TABLET | Refills: 3 | Status: SHIPPED | OUTPATIENT
Start: 2017-12-13 | End: 2018-11-14

## 2017-12-13 RX ORDER — FLUTICASONE PROPIONATE 50 MCG
2 SPRAY, SUSPENSION (ML) NASAL NIGHTLY
Qty: 1 BOTTLE | Refills: 3 | Status: SHIPPED | OUTPATIENT
Start: 2017-12-13 | End: 2018-01-09

## 2017-12-13 NOTE — TELEPHONE ENCOUNTER
Spoke to daughter, pt was seen in ER yesterday and her BP came down on its own during the pt's stay.  Pt did not receive any medication in the ER, daughter states that they were concerned about her heart rate decreasing further if she was given more medicat

## 2017-12-14 ENCOUNTER — TELEPHONE (OUTPATIENT)
Dept: OPHTHALMOLOGY | Facility: CLINIC | Age: 77
End: 2017-12-14

## 2017-12-14 ENCOUNTER — TELEPHONE (OUTPATIENT)
Dept: OTHER | Age: 77
End: 2017-12-14

## 2017-12-14 ENCOUNTER — PATIENT MESSAGE (OUTPATIENT)
Dept: FAMILY MEDICINE CLINIC | Facility: CLINIC | Age: 77
End: 2017-12-14

## 2017-12-14 NOTE — TELEPHONE ENCOUNTER
September 7, 2017      Jean Smiley  [address] [place] [address]         Dear :    We have made several attempts to contact you to schedule an appointment with endocrine. We received a referral for endocrine services. Please contact the office to schedule the appointment or to decline the services.     Sincerely,        Dr Yovany Chowdhury MD  Endocrinology  [facility]  [address] [place]  [address] [phone]    Patients daughter Willy loya called, requesting to see if [facility] could see patient asap. Patient went to an optometrist yesterday and states there is a film covering both eyes causing blockage in patients vision. No soon availability. Please call. Thank you.

## 2017-12-14 NOTE — TELEPHONE ENCOUNTER
Spoke to pts daughter and states that she was seen by her Optometrist and told she has a film over her eye and needs a laser OD>OS. I told pt that [facility] documented the trace opacity OD last year and most likely progressed since its been a year.  I told her th

## 2017-12-14 NOTE — TELEPHONE ENCOUNTER
Called daughter back and reviewed plan. Recommend seeing cardiology and patient may need an ambulatory blood pressure monitoring to see if patient truly has fluctuated blood pressures. In that case would recommend to see a nephrologist as well.   Daughter

## 2017-12-14 NOTE — PROGRESS NOTES
HPI:    Patient ID: Lani Fabian is a 69year old female. HPI     Patient comes in today with her daughter. Patient has been having some fluctuating blood pressures in the past as well.   Yesterday her blood pressure was very elevated and was systoli mouth daily. Disp: 90 tablet Rfl: 3   Pravastatin Sodium 40 MG Oral Tab Take 1 tablet (40 mg total) by mouth nightly. Disp: 90 tablet Rfl: 1   PARoxetine HCl 10 MG Oral Tab Take 1 tablet (10 mg total) by mouth every morning.  Disp: 90 tablet Rfl: 1   Varinder maxillary sinus tenderness and no frontal sinus tenderness. Left sinus exhibits no maxillary sinus tenderness and no frontal sinus tenderness. Mouth/Throat: Oropharynx is clear and moist.   Eyes: Pupils are equal, round, and reactive to light.    Neck: No

## 2017-12-14 NOTE — TELEPHONE ENCOUNTER
Daughter indicated that spoke to brother who is a physician and wondered if patient could be checked for cheochromocytoma. Daughter would like to speak to Dr Sharlet Boxer. Please advise.

## 2017-12-15 NOTE — TELEPHONE ENCOUNTER
From: Sherif Wagner  To: Vu Westbrook MD  Sent: 12/14/2017 8:55 AM CST  Subject: Referral Alexandro Fetters Dr. Henrey Bamberger went to the [facility] yesterday and was told they see a white film in both eyes which does not allow her to see well.   I tried m

## 2017-12-20 ENCOUNTER — PRIOR ORIGINAL RECORDS (OUTPATIENT)
Dept: OTHER | Age: 77
End: 2017-12-20

## 2017-12-20 ENCOUNTER — APPOINTMENT (OUTPATIENT)
Dept: LAB | Facility: HOSPITAL | Age: 77
End: 2017-12-20
Attending: FAMILY MEDICINE
Payer: MEDICARE

## 2017-12-20 ENCOUNTER — TELEPHONE (OUTPATIENT)
Dept: OTHER | Age: 77
End: 2017-12-20

## 2017-12-20 DIAGNOSIS — I10 ESSENTIAL HYPERTENSION WITH GOAL BLOOD PRESSURE LESS THAN 140/90: ICD-10-CM

## 2017-12-20 DIAGNOSIS — N28.9 DECREASED RENAL FUNCTION: ICD-10-CM

## 2017-12-20 PROCEDURE — 80069 RENAL FUNCTION PANEL: CPT

## 2017-12-20 PROCEDURE — 36415 COLL VENOUS BLD VENIPUNCTURE: CPT

## 2017-12-20 NOTE — TELEPHONE ENCOUNTER
Karolyn Hathaway,    PCP refers this new pt to see cardiologist for fluctuate BP. She already made appt with Dr. Kye Gallegos on 1/8/18. But PCP would like pt to be seen sooner, will you be able to see her first then Dr. Kye Gallegos? No other doctors have earlier appt.      Ple

## 2017-12-20 NOTE — TELEPHONE ENCOUNTER
S/w Ana M Travis, nurse supervisor- cardiology dept do not provide personal 24 hour BP monitor. Will ask [facility] APCHELSEA if okay to see pt first. No other cardio providers have appt available before 1/8/18.

## 2017-12-20 NOTE — TELEPHONE ENCOUNTER
Daughter Magalisquan Soniond called, patient has appointment with the cardiologist 1/8/18, there was no appointment sooner, she could only see the nurse practitioner sooner. Sofía Tenorio stated that still no access to the blood pressure monitoring.  Sofía Tenorio stated that she remain

## 2017-12-21 ENCOUNTER — PRIOR ORIGINAL RECORDS (OUTPATIENT)
Dept: OTHER | Age: 77
End: 2017-12-21

## 2017-12-22 ENCOUNTER — OFFICE VISIT (OUTPATIENT)
Dept: CARDIOLOGY CLINIC | Facility: CLINIC | Age: 77
End: 2017-12-22

## 2017-12-22 VITALS
SYSTOLIC BLOOD PRESSURE: 90 MMHG | RESPIRATION RATE: 12 BRPM | DIASTOLIC BLOOD PRESSURE: 60 MMHG | HEART RATE: 56 BPM | BODY MASS INDEX: 24 KG/M2 | WEIGHT: 117 LBS

## 2017-12-22 DIAGNOSIS — I10 ESSENTIAL HYPERTENSION: Primary | ICD-10-CM

## 2017-12-22 PROCEDURE — G0463 HOSPITAL OUTPT CLINIC VISIT: HCPCS | Performed by: NURSE PRACTITIONER

## 2017-12-22 PROCEDURE — 99213 OFFICE O/P EST LOW 20 MIN: CPT | Performed by: NURSE PRACTITIONER

## 2017-12-22 NOTE — PROGRESS NOTES
Jina Fletcher is a 69year old female. Patient presents with: Follow - Up: BP very high- pressure is usually highest in the mornings    HPI:   Patient comes in today for a checkup for high blood pressure.   She was seen last year by Dr. Lisa Norris for consult for not apply Misc 1 each by Finger stick route daily.  Use as directed please dispense needle hope Disp: 100 each Rfl: 2   Blood Pressure Monitoring (BLOOD PRESS MONITOR/M-L CUFF) Does not apply Misc Monitor blood pressure BID Disp: 1 each Rfl: 0   aspirin 8 otherwise  SKIN: denies any unusual skin lesions or rashes  RESPIRATORY: denies shortness of breath with exertion  CARDIOVASCULAR: no chest pain  GI: denies abdominal pain and denies heartburn  NEURO: denies headaches    EXAM:   BP 90/60   Pulse 56   Resp

## 2017-12-22 NOTE — PATIENT INSTRUCTIONS
1. Get a new BP machine and bring it into office to have it compared  2. Avoid salt, exercise more  3.  Call with readings in 2 wks

## 2018-01-11 ENCOUNTER — OFFICE VISIT (OUTPATIENT)
Dept: OPHTHALMOLOGY | Facility: CLINIC | Age: 78
End: 2018-01-11

## 2018-01-11 DIAGNOSIS — H26.493: Primary | ICD-10-CM

## 2018-01-11 DIAGNOSIS — E11.9 DIET-CONTROLLED TYPE 2 DIABETES MELLITUS (HCC): ICD-10-CM

## 2018-01-11 DIAGNOSIS — Z96.1 PSEUDOPHAKIA OF BOTH EYES: ICD-10-CM

## 2018-01-11 PROCEDURE — 92015 DETERMINE REFRACTIVE STATE: CPT | Performed by: OPHTHALMOLOGY

## 2018-01-11 PROCEDURE — 92014 COMPRE OPH EXAM EST PT 1/>: CPT | Performed by: OPHTHALMOLOGY

## 2018-01-11 NOTE — ASSESSMENT & PLAN NOTE
Discussed posterior capsule opacity with patient. Could have YAG laser in both eyes in the near future. Discussed risks, benefits, treatments and alternatives. Information given and reviewed with patient. Patient elects to have YAG laser done.   Would

## 2018-01-11 NOTE — PROGRESS NOTES
Lasha Baum is a 69year old female.     HPI:     HPI     Consult    Additional comments: Per Dr. Kanika Mena    Additional comments: Pt has been a diabetic for 5 years ( Pt has been off all DM meds x 5 months)   5 years on pills/  0 mouth nightly. Disp: 30 tablet Rfl: 3   Fluticasone Propionate 50 MCG/ACT Nasal Suspension 2 sprays by Each Nare route nightly.  Disp: 1 Bottle Rfl: 3   Alendronate Sodium 70 MG Oral Tab  Disp:  Rfl:    acetaminophen 500 MG Oral Tab  Disp:  Rfl:    Sulfamet TIMES EVERY DAY, ONCE IN AM BEFORE FOOD AND ONCE IN EVENING Disp: 100 strip Rfl: 1       Allergies:    Lisinopril              Coughing    ROS:     ROS     Positive for: Endocrine, Eyes    Negative for: Constitutional, Gastrointestinal, Neurological, Skin, +3.00 20/30                 ASSESSMENT/PLAN:     Diagnoses and Plan:     Diet-controlled type 2 diabetes mellitus ([facility]  Diet controlled diabetes: no background of retinopathy, no signs of neovascularization noted.   Discussed ocular and systemic benefits o

## 2018-01-11 NOTE — PATIENT INSTRUCTIONS
Diet-controlled type 2 diabetes mellitus ([facility]  Diet controlled diabetes: no background of retinopathy, no signs of neovascularization noted. Discussed ocular and systemic benefits of blood sugar control.   Diagnosis and treatment discussed in detail with

## 2018-01-12 RX ORDER — FLUTICASONE PROPIONATE 50 MCG
SPRAY, SUSPENSION (ML) NASAL
Qty: 48 G | Refills: 1 | Status: SHIPPED | OUTPATIENT
Start: 2018-01-12 | End: 2018-11-14

## 2018-01-12 RX ORDER — PAROXETINE 10 MG/1
TABLET, FILM COATED ORAL
Qty: 90 TABLET | Refills: 1 | Status: SHIPPED | OUTPATIENT
Start: 2018-01-12 | End: 2018-06-19

## 2018-01-12 NOTE — TELEPHONE ENCOUNTER
Per 12/13/17; metformin discontinued. Spoke to Lorena Diallo, daughter. She confirmed patient is not taking metformin. Will send rx for paroxetine and fluticasone propionate.    Diabetes Medications  Protocol Criteria:  · Appointment scheduled in the past 6

## 2018-01-17 ENCOUNTER — OFFICE VISIT (OUTPATIENT)
Dept: OPHTHALMOLOGY | Facility: CLINIC | Age: 78
End: 2018-01-17

## 2018-01-17 DIAGNOSIS — H26.493: Primary | ICD-10-CM

## 2018-01-17 PROCEDURE — 66821 AFTER CATARACT LASER SURGERY: CPT | Performed by: OPHTHALMOLOGY

## 2018-01-17 NOTE — ASSESSMENT & PLAN NOTE
Right YAG Laser Capsulotomy was done in the office today by Dr. Derek Mackey with no complications. Pt will return next week for Left YAG laser.

## 2018-01-17 NOTE — PATIENT INSTRUCTIONS
After-cataract with vision obscured, bilateral  Right YAG Laser Capsulotomy was done in the office today by Dr. Caroline Parikh with no complications. Pt will return next week for Left YAG laser.

## 2018-01-24 ENCOUNTER — OFFICE VISIT (OUTPATIENT)
Dept: OPHTHALMOLOGY | Facility: CLINIC | Age: 78
End: 2018-01-24

## 2018-01-24 DIAGNOSIS — H26.492 AFTER-CATARACT OBSCURING VISION, LEFT: Primary | ICD-10-CM

## 2018-01-24 PROCEDURE — 66821 AFTER CATARACT LASER SURGERY: CPT | Performed by: OPHTHALMOLOGY

## 2018-01-24 NOTE — PATIENT INSTRUCTIONS
After-cataract obscuring vision, left  Left YAG laser capsulotomy was done in the office today by Dr. Gama Kwong with no complications. Patient will return 2-4 weeks post op both eyes.

## 2018-01-24 NOTE — PROGRESS NOTES
Lore Guallpa is a 69year old female. HPI:     HPI     Patient complains of blurry vision in the left eye and is here for a left YAG laser.      Last edited by Marisol Rahman on 1/24/2018  7:41 AM. (History)        Patient History:  Past Medical History: Rfl:    acetaminophen 500 MG Oral Tab  Disp:  Rfl:    Sulfamethoxazole-TMP -160 MG Oral Tab per tablet Take 1 tablet by mouth 2 (two) times daily.  Disp: 14 tablet Rfl: 0   docusate sodium 100 MG Oral Cap Take 1 capsule (100 mg total) by mouth 2 (two) phoropter          Pupils       Pupils    Right PERRL    Left PERRL          Dilation     Left eye:  1.0% Mydriacyl and 2.5% Emory Synephrine @ 7:48 AM    Myd 1% and Emory 2.5% added OU at 7:55            Refraction     Wearing Rx       Sphere Cylinder Axis Ad

## 2018-01-24 NOTE — ASSESSMENT & PLAN NOTE
Left YAG laser capsulotomy was done in the office today by Dr. July Ball with no complications. Patient will return 2-4 weeks post op both eyes.

## 2018-02-06 ENCOUNTER — OFFICE VISIT (OUTPATIENT)
Dept: OPHTHALMOLOGY | Facility: CLINIC | Age: 78
End: 2018-02-06

## 2018-02-06 DIAGNOSIS — Z98.890 POST-OPERATIVE STATE: Primary | ICD-10-CM

## 2018-02-06 PROBLEM — H26.492 AFTER-CATARACT OBSCURING VISION, LEFT: Status: RESOLVED | Noted: 2018-01-11 | Resolved: 2018-02-06

## 2018-02-06 PROCEDURE — 99024 POSTOP FOLLOW-UP VISIT: CPT | Performed by: OPHTHALMOLOGY

## 2018-02-06 PROCEDURE — G0463 HOSPITAL OUTPT CLINIC VISIT: HCPCS | Performed by: OPHTHALMOLOGY

## 2018-02-06 NOTE — PROGRESS NOTES
Chino Jackson is a 69year old female. HPI:     HPI     Here for a PO dilate after YAG laser capsulotomy OD done on 1/17/18 and OS 1/24/18. Pt has noticed an improvement with her vision and has no ocular complaints.      Last edited by Rocky Art, by mouth nightly. Disp: 30 tablet Rfl: 3   Alendronate Sodium 70 MG Oral Tab  Disp:  Rfl:    acetaminophen 500 MG Oral Tab  Disp:  Rfl:    Sulfamethoxazole-TMP -160 MG Oral Tab per tablet Take 1 tablet by mouth 2 (two) times daily.  Disp: 14 tablet Rf Right Left    Dist cc 20/30 20/30    Correction:  Glasses          Tonometry (Applanation, 4:31 PM)       Right Left    Pressure 18 18          Pupils       Pupils    Right PERRL    Left PERRL          Dilation     Both eyes:  Paremyd @ 4:32 PM

## 2018-02-06 NOTE — PATIENT INSTRUCTIONS
Post-operative state  Patient is doing well after bilateral YAG lasers. New glasses today; update as needed. Discussed that new prescription is only a slight change.

## 2018-02-06 NOTE — ASSESSMENT & PLAN NOTE
Patient is doing well after bilateral YAG lasers. New glasses today; update as needed. Discussed that new prescription is only a slight change.

## 2018-02-16 RX ORDER — ALENDRONATE SODIUM 70 MG/1
TABLET ORAL
Qty: 12 TABLET | Refills: 0 | Status: SHIPPED | OUTPATIENT
Start: 2018-02-16 | End: 2018-06-06

## 2018-02-16 NOTE — TELEPHONE ENCOUNTER
Refill Protocol Appointment Criteria  · Appointment scheduled in the past 6 months or in the next 3 months  Recent Outpatient Visits            1 week ago Post-operative [facility] Tahmina Vasquez MD    Offic

## 2018-03-03 RX ORDER — PRAVASTATIN SODIUM 40 MG
TABLET ORAL
Qty: 90 TABLET | Refills: 0 | Status: SHIPPED | OUTPATIENT
Start: 2018-03-03 | End: 2018-06-06

## 2018-03-03 RX ORDER — LOSARTAN POTASSIUM 50 MG/1
TABLET ORAL
Qty: 180 TABLET | Refills: 0 | Status: SHIPPED | OUTPATIENT
Start: 2018-03-03 | End: 2018-06-06

## 2018-03-27 ENCOUNTER — TELEPHONE (OUTPATIENT)
Dept: FAMILY MEDICINE CLINIC | Facility: CLINIC | Age: 78
End: 2018-03-27

## 2018-03-27 NOTE — TELEPHONE ENCOUNTER
Pt's daughter dropped off placard form. Please complete and mail back to pt's home address in order for her to fill out and sign.   (Form in basket)

## 2018-03-28 NOTE — TELEPHONE ENCOUNTER
Dr Galdino Laguerre will not be at [facility] office until next week, will address then.

## 2018-04-04 ENCOUNTER — TELEPHONE (OUTPATIENT)
Dept: FAMILY MEDICINE CLINIC | Facility: CLINIC | Age: 78
End: 2018-04-04

## 2018-04-04 NOTE — TELEPHONE ENCOUNTER
Spoke with Linh Blanchard daughter, Danisha East told her that the placard form for her mom was ready to be picked up at the [place] location.

## 2018-04-05 NOTE — TELEPHONE ENCOUNTER
Koko Bazzi, [place]          4/4/18 1:12 PM   Note      Spoke with Greg rankin, Nguyen Hayes told her that the placard form for her mom was ready to be picked up at the [facility] location.

## 2018-04-07 ENCOUNTER — HOSPITAL ENCOUNTER (EMERGENCY)
Facility: HOSPITAL | Age: 78
Discharge: HOME | End: 2018-04-07
Attending: EMERGENCY MEDICINE
Payer: MEDICARE

## 2018-04-07 ENCOUNTER — APPOINTMENT (OUTPATIENT)
Dept: CT IMAGING | Facility: HOSPITAL | Age: 78
End: 2018-04-07
Attending: EMERGENCY MEDICINE
Payer: MEDICARE

## 2018-04-07 VITALS
TEMPERATURE: 98 F | HEART RATE: 57 BPM | OXYGEN SATURATION: 95 % | BODY MASS INDEX: 25.4 KG/M2 | WEIGHT: 121 LBS | RESPIRATION RATE: 18 BRPM | DIASTOLIC BLOOD PRESSURE: 72 MMHG | HEIGHT: 58 IN | SYSTOLIC BLOOD PRESSURE: 152 MMHG

## 2018-04-07 DIAGNOSIS — R10.30 LOWER ABDOMINAL PAIN: Primary | ICD-10-CM

## 2018-04-07 PROCEDURE — 80048 BASIC METABOLIC PNL TOTAL CA: CPT | Performed by: EMERGENCY MEDICINE

## 2018-04-07 PROCEDURE — 93010 ELECTROCARDIOGRAM REPORT: CPT | Performed by: EMERGENCY MEDICINE

## 2018-04-07 PROCEDURE — 93005 ELECTROCARDIOGRAM TRACING: CPT

## 2018-04-07 PROCEDURE — 96374 THER/PROPH/DIAG INJ IV PUSH: CPT

## 2018-04-07 PROCEDURE — 80076 HEPATIC FUNCTION PANEL: CPT | Performed by: EMERGENCY MEDICINE

## 2018-04-07 PROCEDURE — 99285 EMERGENCY DEPT VISIT HI MDM: CPT

## 2018-04-07 PROCEDURE — 85025 COMPLETE CBC W/AUTO DIFF WBC: CPT | Performed by: EMERGENCY MEDICINE

## 2018-04-07 PROCEDURE — 74177 CT ABD & PELVIS W/CONTRAST: CPT | Performed by: EMERGENCY MEDICINE

## 2018-04-07 PROCEDURE — 81003 URINALYSIS AUTO W/O SCOPE: CPT | Performed by: EMERGENCY MEDICINE

## 2018-04-07 RX ORDER — MORPHINE SULFATE 4 MG/ML
4 INJECTION, SOLUTION INTRAMUSCULAR; INTRAVENOUS ONCE
Status: COMPLETED | OUTPATIENT
Start: 2018-04-07 | End: 2018-04-07

## 2018-04-07 NOTE — ED NOTES
Received pt from triage. Pt here with c/o LLQ abd pain. Denies any n/v/d. Pt with h/o diverticulitis. Abd tender on palpation, bowel sounds present. IV inserted, labs drawn and sent. Pt resting cart, will continue to monitor.

## 2018-04-07 NOTE — ED PROVIDER NOTES
Patient Seen in: [facility] AND [facility] Emergency Department    History   Patient presents with:  Dizziness (neurologic)    Stated Complaint:     HPI    Patient is 67-year-old  female who presents to the emergency department with a chief complaint of All other systems reviewed and negative except as noted above.     Physical Exam   ED Triage Vitals [04/07/18 1613]  BP: 159/57  Pulse: 59  Resp: 18  Temp: 97.8 °F (36.6 °C)  Temp src: Oral  SpO2: 98 %  O2 Device: None (Room air)    Current:/72   Puls CBC W/ DIFFERENTIAL[[id number]]                              Final result                 Please view results for these tests on the individual orders. CBC W/ DIFFERENTIAL     EKG    Rate, intervals and axes as noted on EKG Report.   Rate: 57  Rhythm: Sinu

## 2018-04-07 NOTE — ED INITIAL ASSESSMENT (HPI)
c/o right sided abdominal pain x 3days + hypertensive at home, denies vomiting or diarrhea. Denies CP or SOB, no fevers at home.  c/o dizziness as well

## 2018-06-04 NOTE — TELEPHONE ENCOUNTER
From: Kateryna Mitchell  Sent: 6/3/2018 5:25 PM CDT  Subject: Medication Renewal Request    Kateryna Mitchell would like a refill of the following medications:     Metoprolol Succinate  MG Oral Tablet 24 Hr [Rebecca Patel MD]   Patient Comment: need asap ra

## 2018-06-05 RX ORDER — METOPROLOL SUCCINATE 100 MG/1
100 TABLET, EXTENDED RELEASE ORAL DAILY
Qty: 90 TABLET | Refills: 0 | Status: SHIPPED
Start: 2018-06-05 | End: 2018-06-29

## 2018-06-05 NOTE — TELEPHONE ENCOUNTER
Hypertensive Medications  Protocol Criteria:  · Appointment scheduled in the past 6 months or in the next 3 months  · BMP or CMP in the past 12 months  · Creatinine result < 2  Recent Outpatient Visits            3 months ago [facility]

## 2018-06-06 ENCOUNTER — HOSPITAL ENCOUNTER (OUTPATIENT)
Dept: GENERAL RADIOLOGY | Facility: HOSPITAL | Age: 78
Discharge: HOME | End: 2018-06-06
Attending: FAMILY MEDICINE
Payer: MEDICARE

## 2018-06-06 ENCOUNTER — OFFICE VISIT (OUTPATIENT)
Dept: FAMILY MEDICINE CLINIC | Facility: CLINIC | Age: 78
End: 2018-06-06

## 2018-06-06 VITALS
DIASTOLIC BLOOD PRESSURE: 57 MMHG | HEART RATE: 50 BPM | BODY MASS INDEX: 27.65 KG/M2 | TEMPERATURE: 98 F | SYSTOLIC BLOOD PRESSURE: 123 MMHG | WEIGHT: 119.5 LBS | HEIGHT: 55 IN

## 2018-06-06 DIAGNOSIS — E11.9 CONTROLLED TYPE 2 DIABETES MELLITUS WITHOUT COMPLICATION, WITHOUT LONG-TERM CURRENT USE OF INSULIN (HCC): ICD-10-CM

## 2018-06-06 DIAGNOSIS — E78.00 HYPERCHOLESTEREMIA: ICD-10-CM

## 2018-06-06 DIAGNOSIS — M54.42 CHRONIC BILATERAL LOW BACK PAIN WITH BILATERAL SCIATICA: ICD-10-CM

## 2018-06-06 DIAGNOSIS — G89.29 CHRONIC BILATERAL LOW BACK PAIN WITH BILATERAL SCIATICA: ICD-10-CM

## 2018-06-06 DIAGNOSIS — M15.9 PRIMARY OSTEOARTHRITIS INVOLVING MULTIPLE JOINTS: Primary | ICD-10-CM

## 2018-06-06 DIAGNOSIS — E11.9 DIET-CONTROLLED TYPE 2 DIABETES MELLITUS (HCC): ICD-10-CM

## 2018-06-06 DIAGNOSIS — I10 ESSENTIAL HYPERTENSION WITH GOAL BLOOD PRESSURE LESS THAN 140/90: ICD-10-CM

## 2018-06-06 DIAGNOSIS — M54.41 CHRONIC BILATERAL LOW BACK PAIN WITH BILATERAL SCIATICA: ICD-10-CM

## 2018-06-06 DIAGNOSIS — D22.5 NEVUS OF GROIN: ICD-10-CM

## 2018-06-06 PROBLEM — M85.80 OSTEOPENIA: Status: RESOLVED | Noted: 2017-02-22 | Resolved: 2018-06-06

## 2018-06-06 PROBLEM — D69.6 THROMBOCYTOPENIA: Chronic | Status: RESOLVED | Noted: 2017-07-17 | Resolved: 2018-06-06

## 2018-06-06 PROBLEM — R09.81 NASAL CONGESTION: Status: RESOLVED | Noted: 2017-12-13 | Resolved: 2018-06-06

## 2018-06-06 PROBLEM — Z98.890 POST-OPERATIVE STATE: Status: RESOLVED | Noted: 2018-02-06 | Resolved: 2018-06-06

## 2018-06-06 PROBLEM — D69.6 THROMBOCYTOPENIA (HCC): Chronic | Status: RESOLVED | Noted: 2017-07-17 | Resolved: 2018-06-06

## 2018-06-06 PROCEDURE — 99214 OFFICE O/P EST MOD 30 MIN: CPT | Performed by: FAMILY MEDICINE

## 2018-06-06 PROCEDURE — 99212 OFFICE O/P EST SF 10 MIN: CPT | Performed by: FAMILY MEDICINE

## 2018-06-06 PROCEDURE — 72100 X-RAY EXAM L-S SPINE 2/3 VWS: CPT | Performed by: FAMILY MEDICINE

## 2018-06-06 RX ORDER — ALENDRONATE SODIUM 70 MG/1
70 TABLET ORAL WEEKLY
Qty: 12 TABLET | Refills: 3 | Status: SHIPPED | OUTPATIENT
Start: 2018-06-06 | End: 2018-12-29

## 2018-06-06 RX ORDER — LOSARTAN POTASSIUM 50 MG/1
50 TABLET ORAL 2 TIMES DAILY
Qty: 180 TABLET | Refills: 3 | Status: SHIPPED | OUTPATIENT
Start: 2018-06-06 | End: 2018-12-27

## 2018-06-06 RX ORDER — PRAVASTATIN SODIUM 40 MG
40 TABLET ORAL NIGHTLY
Qty: 90 TABLET | Refills: 3 | Status: ON HOLD | OUTPATIENT
Start: 2018-06-06 | End: 2018-11-21

## 2018-06-06 NOTE — PROGRESS NOTES
HPI:    Patient ID: Jina Fletcher is a 69year old female. HPI    Patient here with her daughter. Overall complains of having pain in lower back. Pain is more so on the left side and goes down both her buttocks into her legs.     Denies any weakness or Monitoring (BLOOD PRESS MONITOR/M-L CUFF) Does not apply Misc Monitor blood pressure BID Disp: 1 each Rfl: 0   Blood Pressure Does not apply Kit With Adult cuff Disp: 1 kit Rfl: 0   FLUTICASONE PROPIONATE 50 MCG/ACT Nasal Suspension USE 2 DADA EN CADA FO normal.   Foot exam normal  No parasthesia  No skin lesions  Hair present on toes. Pulses intact   Skin: Skin is warm and dry. No rash noted. Patient has about a 1.5 cm plaque-like lesion on the right thigh which is raised and keratotic.   Daughter state - INTERNAL  DERM - INTERNAL  XR LUMBAR SPINE (MIN 2 VIEWS) (CPT=72100)       #[id number]

## 2018-06-09 ENCOUNTER — TELEPHONE (OUTPATIENT)
Dept: OTHER | Age: 78
End: 2018-06-09

## 2018-06-09 NOTE — TELEPHONE ENCOUNTER
Should make appointment with Dr Saba Castellon to check this hump. If sig symptoms over the weekend would advise IC/ urgent care visit.

## 2018-06-09 NOTE — TELEPHONE ENCOUNTER
Contacted patient's daughter Alexandra Anderson verified, will go to UC this weekend if pain is severe, follow up appt made with Dr Nain Huff for 6/13/18

## 2018-06-09 NOTE — TELEPHONE ENCOUNTER
Daughter reports that patient seen Dr Samra Howard on 6/6/18, and forgot to mention about the HUMP on patient's R upper hand between shoulder and rib cage, soft but can feel the bone, painful, only takes OTC tylenol for pain and applying OTC patch and lidocaine cr

## 2018-06-13 ENCOUNTER — HOSPITAL ENCOUNTER (OUTPATIENT)
Dept: GENERAL RADIOLOGY | Facility: HOSPITAL | Age: 78
Discharge: HOME | End: 2018-06-13
Attending: FAMILY MEDICINE
Payer: MEDICARE

## 2018-06-13 ENCOUNTER — OFFICE VISIT (OUTPATIENT)
Dept: FAMILY MEDICINE CLINIC | Facility: CLINIC | Age: 78
End: 2018-06-13

## 2018-06-13 VITALS
DIASTOLIC BLOOD PRESSURE: 67 MMHG | TEMPERATURE: 98 F | HEIGHT: 58 IN | SYSTOLIC BLOOD PRESSURE: 145 MMHG | BODY MASS INDEX: 25.19 KG/M2 | WEIGHT: 120 LBS | HEART RATE: 55 BPM

## 2018-06-13 DIAGNOSIS — M54.9 UPPER BACK PAIN ON RIGHT SIDE: ICD-10-CM

## 2018-06-13 DIAGNOSIS — D17.1 LIPOMA OF TORSO: ICD-10-CM

## 2018-06-13 DIAGNOSIS — M54.9 UPPER BACK PAIN ON RIGHT SIDE: Primary | ICD-10-CM

## 2018-06-13 PROCEDURE — 99212 OFFICE O/P EST SF 10 MIN: CPT | Performed by: FAMILY MEDICINE

## 2018-06-13 PROCEDURE — 72070 X-RAY EXAM THORAC SPINE 2VWS: CPT | Performed by: FAMILY MEDICINE

## 2018-06-13 PROCEDURE — 99213 OFFICE O/P EST LOW 20 MIN: CPT | Performed by: FAMILY MEDICINE

## 2018-06-13 NOTE — PROGRESS NOTES
HPI:    Patient ID: Lani Fabian is a 69year old female. HPI     Patient presents with:   Other: painful hump on upper back pat travels thru Right shoulder  had it for a while         Patient here with complains of right upper back pain for the last 2 METER) w/Device Does not apply Kit  Disp:  Rfl:    FREESTYLE LANCETS Does not apply Misc 1 each by Finger stick route daily.  Use as directed please dispense needle hope Disp: 100 each Rfl: 2   Blood Pressure Monitoring (BLOOD PRESS MONITOR/M-L CUFF) Does

## 2018-06-20 RX ORDER — PAROXETINE 10 MG/1
TABLET, FILM COATED ORAL
Qty: 90 TABLET | Refills: 0 | Status: SHIPPED | OUTPATIENT
Start: 2018-06-20 | End: 2018-11-14

## 2018-06-29 RX ORDER — METOPROLOL SUCCINATE 100 MG/1
TABLET, EXTENDED RELEASE ORAL
Qty: 90 TABLET | Refills: 0 | Status: SHIPPED | OUTPATIENT
Start: 2018-06-29 | End: 2018-09-28

## 2018-06-30 NOTE — TELEPHONE ENCOUNTER
Hypertensive Medications  Protocol Criteria:  · Appointment scheduled in the past 6 months or in the next 3 months  · BMP or CMP in the past 12 months  · Creatinine result < 2  Recent Outpatient Visits            2 weeks ago Upper back pain on right side

## 2018-07-01 ENCOUNTER — APPOINTMENT (OUTPATIENT)
Dept: LAB | Facility: HOSPITAL | Age: 78
End: 2018-07-01
Attending: FAMILY MEDICINE
Payer: MEDICARE

## 2018-07-01 DIAGNOSIS — I10 ESSENTIAL HYPERTENSION WITH GOAL BLOOD PRESSURE LESS THAN 140/90: ICD-10-CM

## 2018-07-01 DIAGNOSIS — E78.00 HYPERCHOLESTEREMIA: ICD-10-CM

## 2018-07-01 DIAGNOSIS — E11.9 CONTROLLED TYPE 2 DIABETES MELLITUS WITHOUT COMPLICATION, WITHOUT LONG-TERM CURRENT USE OF INSULIN (HCC): ICD-10-CM

## 2018-07-01 LAB
ALBUMIN SERPL BCP-MCNC: 4 G/DL (ref 3.5–4.8)
ALBUMIN/GLOB SERPL: 1.4 {RATIO} (ref 1–2)
ALP SERPL-CCNC: 82 U/L (ref 32–100)
ALT SERPL-CCNC: 15 U/L (ref 14–54)
ANION GAP SERPL CALC-SCNC: 9 MMOL/L (ref 0–18)
AST SERPL-CCNC: 21 U/L (ref 15–41)
BILIRUB SERPL-MCNC: 0.8 MG/DL (ref 0.3–1.2)
BUN SERPL-MCNC: 7 MG/DL (ref 8–20)
BUN/CREAT SERPL: 8.4 (ref 10–20)
CALCIUM SERPL-MCNC: 8.8 MG/DL (ref 8.5–10.5)
CHLORIDE SERPL-SCNC: 103 MMOL/L (ref 95–110)
CHOLEST SERPL-MCNC: 198 MG/DL (ref 110–200)
CO2 SERPL-SCNC: 28 MMOL/L (ref 22–32)
CREAT SERPL-MCNC: 0.83 MG/DL (ref 0.5–1.5)
CREAT UR-MCNC: 187.7 MG/DL
GLOBULIN PLAS-MCNC: 2.8 G/DL (ref 2.5–3.7)
GLUCOSE SERPL-MCNC: 123 MG/DL (ref 70–99)
HBA1C MFR BLD: 6.1 % (ref 4–6)
HDLC SERPL-MCNC: 39 MG/DL
LDLC SERPL CALC-MCNC: 113 MG/DL (ref 0–99)
MICROALBUMIN UR-MCNC: 3.1 MG/DL (ref 0–1.8)
MICROALBUMIN/CREAT UR: 16.5 MG/G{CREAT} (ref 0–20)
NONHDLC SERPL-MCNC: 159 MG/DL
OSMOLALITY UR CALC.SUM OF ELEC: 289 MOSM/KG (ref 275–295)
PATIENT FASTING: YES
POTASSIUM SERPL-SCNC: 3.9 MMOL/L (ref 3.3–5.1)
PROT SERPL-MCNC: 6.8 G/DL (ref 5.9–8.4)
SODIUM SERPL-SCNC: 140 MMOL/L (ref 136–144)
TRIGL SERPL-MCNC: 230 MG/DL (ref 1–149)

## 2018-07-01 PROCEDURE — 80061 LIPID PANEL: CPT

## 2018-07-01 PROCEDURE — 82570 ASSAY OF URINE CREATININE: CPT

## 2018-07-01 PROCEDURE — 82043 UR ALBUMIN QUANTITATIVE: CPT

## 2018-07-01 PROCEDURE — 83036 HEMOGLOBIN GLYCOSYLATED A1C: CPT

## 2018-07-01 PROCEDURE — 80053 COMPREHEN METABOLIC PANEL: CPT

## 2018-07-01 PROCEDURE — 36415 COLL VENOUS BLD VENIPUNCTURE: CPT

## 2018-07-11 ENCOUNTER — OFFICE VISIT (OUTPATIENT)
Dept: DERMATOLOGY CLINIC | Facility: CLINIC | Age: 78
End: 2018-07-11

## 2018-07-11 DIAGNOSIS — D23.4 BENIGN NEOPLASM OF SCALP AND SKIN OF NECK: ICD-10-CM

## 2018-07-11 DIAGNOSIS — D23.60 BENIGN NEOPLASM OF SKIN OF UPPER LIMB, INCLUDING SHOULDER, UNSPECIFIED LATERALITY: ICD-10-CM

## 2018-07-11 DIAGNOSIS — L82.0 INFLAMED SEBORRHEIC KERATOSIS: Primary | ICD-10-CM

## 2018-07-11 DIAGNOSIS — D23.30 BENIGN NEOPLASM OF SKIN OF FACE: ICD-10-CM

## 2018-07-11 DIAGNOSIS — D23.5 BENIGN NEOPLASM OF SKIN OF TRUNK, EXCEPT SCROTUM: ICD-10-CM

## 2018-07-11 DIAGNOSIS — D23.70 BENIGN NEOPLASM OF SKIN OF LOWER LIMB, INCLUDING HIP, UNSPECIFIED LATERALITY: ICD-10-CM

## 2018-07-11 PROCEDURE — 99212 OFFICE O/P EST SF 10 MIN: CPT | Performed by: DERMATOLOGY

## 2018-07-11 PROCEDURE — 99202 OFFICE O/P NEW SF 15 MIN: CPT | Performed by: DERMATOLOGY

## 2018-07-17 ENCOUNTER — TELEPHONE (OUTPATIENT)
Dept: NEUROLOGY | Facility: CLINIC | Age: 78
End: 2018-07-17

## 2018-07-17 ENCOUNTER — OFFICE VISIT (OUTPATIENT)
Dept: NEUROLOGY | Facility: CLINIC | Age: 78
End: 2018-07-17
Payer: MEDICARE

## 2018-07-17 VITALS
RESPIRATION RATE: 20 BRPM | HEART RATE: 66 BPM | HEIGHT: 58 IN | BODY MASS INDEX: 25.19 KG/M2 | WEIGHT: 120 LBS | DIASTOLIC BLOOD PRESSURE: 58 MMHG | SYSTOLIC BLOOD PRESSURE: 120 MMHG

## 2018-07-17 DIAGNOSIS — M79.89 MASS OF SOFT TISSUE: Primary | ICD-10-CM

## 2018-07-17 DIAGNOSIS — M47.816 LUMBAR FACET ARTHROPATHY: ICD-10-CM

## 2018-07-17 PROCEDURE — 99204 OFFICE O/P NEW MOD 45 MIN: CPT | Performed by: PHYSICAL MEDICINE & REHABILITATION

## 2018-07-17 RX ORDER — DIAZEPAM 2 MG/1
TABLET ORAL
Qty: 2 TABLET | Refills: 0 | Status: SHIPPED | OUTPATIENT
Start: 2018-07-17 | End: 2018-11-14

## 2018-07-17 NOTE — H&P
[facility] [place] [address], [facility]    History and Physical    Louellen Ing Patient Status:  No patient class for patient encounter    5/10/1940 MRN [id number]   Location [place] [address], [place] [address], Hypercholesteremia    • Hypertension    • Syncope 2009    2D echo ventriular wall thickening   • Type II or unspecified type diabetes mellitus without mention of complication, not stated as uncontrolled    • Unspecified essential hypertension      Past Sanjeev well-nourished. HENT:   Head: Normocephalic and atraumatic. Eyes: Conjunctivae and EOM are normal.   Pulmonary/Chest: Effort normal.   Musculoskeletal:   Lumbar range of motion: Lumbar flexion to 50 degrees with bilateral low back pain.   Lumbar extensi L4-5.    Assessment/Plan:   1) thoracic soft tissue mass, likely a lipoma  2) axial bilateral lower back pain    Recommend physical therapy for neutral to flexion lumbar stabilization; differential diagnosis includes lumbar facet arthropathy vs stenosis.  A

## 2018-07-18 NOTE — TELEPHONE ENCOUNTER
[facility] Help  to check on status for authorization of approval of MRI T-spine wo. Talked to   [address] who states they have not received facility information from Napoleon Toribio so she cannot release authorization #. Await approval via fax.

## 2018-07-19 NOTE — TELEPHONE ENCOUNTER
[address] [place] [address] Help to check on status of authorization for approval of MRI T-spine wo. T/t Jennifer Lopez who states approved with Authorization  # [id number] effective 07/18/18 to 08/18/18. Will call Pt. To inform.  L/m on Sonia's v/m advising of approva

## 2018-07-22 NOTE — PROGRESS NOTES
Zeb Valencia is a 69year old female. HPI:     CC:  Patient presents with:  Lesion: LOV 1/14/2015. Pt presenting with lesion to groin. Pt denies hx of skin cancer.          Allergies:  Lisinopril    HISTORY:    Past Medical History:   Diagnosis Date   • A tablet Rfl: 3   Losartan Potassium 50 MG Oral Tab Take 1 tablet (50 mg total) by mouth 2 (two) times daily.  Disp: 180 tablet Rfl: 3   FLUTICASONE PROPIONATE 50 MCG/ACT Nasal Suspension USE 2 ROCIOS EN CADA FOSA NASAL CADA NOCHE Disp: 48 g Rfl: 1   loratadi Type II or unspecified type diabetes mellitus without mention of complication, not stated as uncontrolled    • Unspecified essential hypertension      Past Surgical History:  1/11/16: CATARACT EXTRACTION W/  INTRAOCULAR LENS IMPLA* Left      Comment: LILA History, medications, allergies reviewed as noted. Physical Examination:     Well-developed well-nourished patient alert oriented in no acute distress.   Exam total-body performed, including scalp, head, neck, face,nails, hair, external eyes, includin benefits discussed. Pathophysiology discussed with patient. Therapeutic options reviewed. See  Medications in grid. Instructions reviewed at length. Benign nevi, seborrheic  keratoses, cherry angiomas:  Reassurance regarding other benign skin lesions.

## 2018-07-27 ENCOUNTER — HOSPITAL ENCOUNTER (EMERGENCY)
Facility: HOSPITAL | Age: 78
Discharge: HOME | End: 2018-07-27
Attending: EMERGENCY MEDICINE
Payer: MEDICARE

## 2018-07-27 ENCOUNTER — APPOINTMENT (OUTPATIENT)
Dept: CT IMAGING | Facility: HOSPITAL | Age: 78
End: 2018-07-27
Attending: EMERGENCY MEDICINE
Payer: MEDICARE

## 2018-07-27 VITALS
SYSTOLIC BLOOD PRESSURE: 163 MMHG | OXYGEN SATURATION: 96 % | RESPIRATION RATE: 18 BRPM | DIASTOLIC BLOOD PRESSURE: 55 MMHG | BODY MASS INDEX: 24.18 KG/M2 | HEIGHT: 59 IN | TEMPERATURE: 99 F | HEART RATE: 71 BPM | WEIGHT: 119.94 LBS

## 2018-07-27 DIAGNOSIS — R73.9 HYPERGLYCEMIA: ICD-10-CM

## 2018-07-27 DIAGNOSIS — R25.1 TREMORS OF NERVOUS SYSTEM: Primary | ICD-10-CM

## 2018-07-27 LAB
ANION GAP SERPL CALC-SCNC: 10 MMOL/L (ref 0–18)
BASOPHILS # BLD: 0 K/UL (ref 0–0.2)
BASOPHILS NFR BLD: 1 %
BUN SERPL-MCNC: 9 MG/DL (ref 8–20)
BUN/CREAT SERPL: 9.3 (ref 10–20)
CALCIUM SERPL-MCNC: 9 MG/DL (ref 8.5–10.5)
CHLORIDE SERPL-SCNC: 103 MMOL/L (ref 95–110)
CO2 SERPL-SCNC: 25 MMOL/L (ref 22–32)
CREAT SERPL-MCNC: 0.97 MG/DL (ref 0.5–1.5)
EOSINOPHIL # BLD: 0.5 K/UL (ref 0–0.7)
EOSINOPHIL NFR BLD: 7 %
ERYTHROCYTE [DISTWIDTH] IN BLOOD BY AUTOMATED COUNT: 12.9 % (ref 11–15)
GLUCOSE SERPL-MCNC: 233 MG/DL (ref 70–99)
HCT VFR BLD AUTO: 42.3 % (ref 35–48)
HGB BLD-MCNC: 14.9 G/DL (ref 12–16)
LYMPHOCYTES # BLD: 1.9 K/UL (ref 1–4)
LYMPHOCYTES NFR BLD: 30 %
MCH RBC QN AUTO: 31 PG (ref 27–32)
MCHC RBC AUTO-ENTMCNC: 35.4 G/DL (ref 32–37)
MCV RBC AUTO: 87.5 FL (ref 80–100)
MONOCYTES # BLD: 0.4 K/UL (ref 0–1)
MONOCYTES NFR BLD: 7 %
NEUTROPHILS # BLD AUTO: 3.6 K/UL (ref 1.8–7.7)
NEUTROPHILS NFR BLD: 56 %
OSMOLALITY UR CALC.SUM OF ELEC: 292 MOSM/KG (ref 275–295)
PLATELET # BLD AUTO: 149 K/UL (ref 140–400)
PMV BLD AUTO: 9 FL (ref 7.4–10.3)
POTASSIUM SERPL-SCNC: 3.8 MMOL/L (ref 3.3–5.1)
RBC # BLD AUTO: 4.83 M/UL (ref 3.7–5.4)
SODIUM SERPL-SCNC: 138 MMOL/L (ref 136–144)
WBC # BLD AUTO: 6.4 K/UL (ref 4–11)

## 2018-07-27 PROCEDURE — 36415 COLL VENOUS BLD VENIPUNCTURE: CPT

## 2018-07-27 PROCEDURE — 80048 BASIC METABOLIC PNL TOTAL CA: CPT | Performed by: EMERGENCY MEDICINE

## 2018-07-27 PROCEDURE — 70450 CT HEAD/BRAIN W/O DYE: CPT | Performed by: EMERGENCY MEDICINE

## 2018-07-27 PROCEDURE — 85025 COMPLETE CBC W/AUTO DIFF WBC: CPT | Performed by: EMERGENCY MEDICINE

## 2018-07-27 PROCEDURE — 99284 EMERGENCY DEPT VISIT MOD MDM: CPT

## 2018-07-28 NOTE — ED PROVIDER NOTES
Patient Seen in: [facility] AND [facility] Emergency Department    History   Patient presents with:  Tremors: R arm    Stated Complaint: R arm tremors/pain. HPI    Patient complains of intermittent tremors in r arm. 4 episodes today.   Each lasting very brie USE 2 ROCIOS EN CADA FOSA NASAL CADA NOCHE   loratadine 10 MG Oral Tab,  Take 1 tablet (10 mg total) by mouth nightly. acetaminophen 500 MG Oral Tab,  Take 500 mg by mouth as needed for Pain.      docusate sodium 100 MG Oral Cap,  Take 1 capsule (100 mg °F (36.9 °C)   Resp 18   Ht 149.9 cm (4' 11\")   Wt 54.4 kg   SpO2 96%   BMI 24.22 kg/m²    PULSE OX Nl on room air    GENERAL: awake alert no focal deficits  HEAD: normocephalic, atraumatic,   EYES: PERRLA, EOMI, conj sclera clear  THROAT: mmm, no lesions [facility]  [phone]          We recommend that you schedule follow up care with a primary care provider within the next three months to obtain basic health screening including reassessment of your blood pressure.     Medications

## 2018-07-28 NOTE — ED NOTES
Malian speaking patient accompanied by daughter for c/o multiple episodes of tremors/shaking to right arm, from her hand to her shoulder. Patient denies any trauma/injury, no HA, no visual disturbances, negative stroke scale with no weakness noted.   Eda

## 2018-07-28 NOTE — ED INITIAL ASSESSMENT (HPI)
Pt reports intermittent right arm tremors for the last 2 weeks, had episode last night which has resolved. Daughter concerned because pt has hx of stroke 2 years pta.

## 2018-08-02 ENCOUNTER — HOSPITAL ENCOUNTER (OUTPATIENT)
Dept: MRI IMAGING | Facility: HOSPITAL | Age: 78
Discharge: HOME | End: 2018-08-02
Attending: PHYSICAL MEDICINE & REHABILITATION
Payer: MEDICARE

## 2018-08-02 DIAGNOSIS — M79.89 MASS OF SOFT TISSUE: ICD-10-CM

## 2018-08-02 PROCEDURE — 72146 MRI CHEST SPINE W/O DYE: CPT | Performed by: PHYSICAL MEDICINE & REHABILITATION

## 2018-08-06 ENCOUNTER — TELEPHONE (OUTPATIENT)
Dept: NEUROLOGY | Facility: CLINIC | Age: 78
End: 2018-08-06

## 2018-08-06 DIAGNOSIS — M79.89 SOFT TISSUE MASS: Primary | ICD-10-CM

## 2018-08-06 NOTE — TELEPHONE ENCOUNTER
[address] [employer] for authorization of approval for MRI right scapula/shoulder wo.  Tracking # [id number] pending approval.

## 2018-08-06 NOTE — TELEPHONE ENCOUNTER
Spoke with patient's daughter, notified of results. Ok per Vinod Smith, notified understanding.  The daughter states she was told by the MRI tech that the test ordered would not show the complete lump on on her right shoulder, would like to know if any further imag

## 2018-08-06 NOTE — TELEPHONE ENCOUNTER
----- Message from Keily Garcia DO sent at 8/6/2018 11:16 AM CDT -----  Please let the patient know I've reviewed the MRI of the thoracic region - no soft tissue mass was seen on the study. She has a few small disc bulges that are mild.  She should contin

## 2018-08-06 NOTE — PROGRESS NOTES
Please let the patient know I've reviewed the MRI of the thoracic region - no soft tissue mass was seen on the study. She has a few small disc bulges that are mild. She should continue with physical therapy as planned.

## 2018-08-07 NOTE — TELEPHONE ENCOUNTER
[facility] Help to check on status  for authorization of approval of MRI right scapula/shoulder wo. Talked to Paul LYN who states it is still pending.  Will call [facility] in the am(call center is now closed.)

## 2018-08-08 NOTE — TELEPHONE ENCOUNTER
Called [facility] to request reference #. T/t Anupama Sheehan. who added reference number. She will forward information to [address] [place] [address] so they can release authorization number.  Await fax

## 2018-08-09 NOTE — TELEPHONE ENCOUNTER
2000 [facility] Help to check on status for authorization of approval of MRI right scapula/shoulder wo. Talked to [facility]. who states  approved with  Authorization # [id number]  effective 08/07/18 to 09/06/18. Will call Pt. To inform.  Gaby Dixon informed

## 2018-08-21 ENCOUNTER — HOSPITAL ENCOUNTER (OUTPATIENT)
Dept: MRI IMAGING | Facility: HOSPITAL | Age: 78
Discharge: HOME | End: 2018-08-21
Attending: PHYSICAL MEDICINE & REHABILITATION
Payer: MEDICARE

## 2018-08-21 DIAGNOSIS — M79.89 SOFT TISSUE MASS: ICD-10-CM

## 2018-08-21 PROCEDURE — 73218 MRI UPPER EXTREMITY W/O DYE: CPT | Performed by: PHYSICAL MEDICINE & REHABILITATION

## 2018-08-31 ENCOUNTER — HOSPITAL ENCOUNTER (EMERGENCY)
Facility: HOSPITAL | Age: 78
Discharge: HOME | End: 2018-08-31
Attending: EMERGENCY MEDICINE
Payer: MEDICARE

## 2018-08-31 VITALS
HEART RATE: 60 BPM | OXYGEN SATURATION: 99 % | WEIGHT: 121.25 LBS | SYSTOLIC BLOOD PRESSURE: 174 MMHG | BODY MASS INDEX: 24 KG/M2 | TEMPERATURE: 98 F | RESPIRATION RATE: 18 BRPM | DIASTOLIC BLOOD PRESSURE: 72 MMHG

## 2018-08-31 DIAGNOSIS — I10 ESSENTIAL HYPERTENSION: Primary | ICD-10-CM

## 2018-08-31 LAB
ANION GAP SERPL CALC-SCNC: 7 MMOL/L (ref 0–18)
BASOPHILS # BLD: 0 K/UL (ref 0–0.2)
BASOPHILS NFR BLD: 1 %
BUN SERPL-MCNC: 10 MG/DL (ref 8–20)
BUN/CREAT SERPL: 11 (ref 10–20)
CALCIUM SERPL-MCNC: 8.7 MG/DL (ref 8.5–10.5)
CHLORIDE SERPL-SCNC: 107 MMOL/L (ref 95–110)
CO2 SERPL-SCNC: 25 MMOL/L (ref 22–32)
CREAT SERPL-MCNC: 0.91 MG/DL (ref 0.5–1.5)
EOSINOPHIL # BLD: 0.3 K/UL (ref 0–0.7)
EOSINOPHIL NFR BLD: 3 %
ERYTHROCYTE [DISTWIDTH] IN BLOOD BY AUTOMATED COUNT: 13.1 % (ref 11–15)
GLUCOSE SERPL-MCNC: 121 MG/DL (ref 70–99)
HCT VFR BLD AUTO: 43.2 % (ref 35–48)
HGB BLD-MCNC: 14.9 G/DL (ref 12–16)
LYMPHOCYTES # BLD: 2.1 K/UL (ref 1–4)
LYMPHOCYTES NFR BLD: 22 %
MCH RBC QN AUTO: 29.9 PG (ref 27–32)
MCHC RBC AUTO-ENTMCNC: 34.4 G/DL (ref 32–37)
MCV RBC AUTO: 86.7 FL (ref 80–100)
MONOCYTES # BLD: 0.6 K/UL (ref 0–1)
MONOCYTES NFR BLD: 6 %
NEUTROPHILS # BLD AUTO: 6.8 K/UL (ref 1.8–7.7)
NEUTROPHILS NFR BLD: 69 %
OSMOLALITY UR CALC.SUM OF ELEC: 288 MOSM/KG (ref 275–295)
PLATELET # BLD AUTO: 150 K/UL (ref 140–400)
PMV BLD AUTO: 9.1 FL (ref 7.4–10.3)
POTASSIUM SERPL-SCNC: 3.4 MMOL/L (ref 3.3–5.1)
RBC # BLD AUTO: 4.98 M/UL (ref 3.7–5.4)
SODIUM SERPL-SCNC: 139 MMOL/L (ref 136–144)
WBC # BLD AUTO: 9.9 K/UL (ref 4–11)

## 2018-08-31 PROCEDURE — 93005 ELECTROCARDIOGRAM TRACING: CPT

## 2018-08-31 PROCEDURE — 96374 THER/PROPH/DIAG INJ IV PUSH: CPT

## 2018-08-31 PROCEDURE — 93010 ELECTROCARDIOGRAM REPORT: CPT | Performed by: EMERGENCY MEDICINE

## 2018-08-31 PROCEDURE — 99285 EMERGENCY DEPT VISIT HI MDM: CPT

## 2018-08-31 PROCEDURE — 85025 COMPLETE CBC W/AUTO DIFF WBC: CPT | Performed by: EMERGENCY MEDICINE

## 2018-08-31 PROCEDURE — 80048 BASIC METABOLIC PNL TOTAL CA: CPT | Performed by: EMERGENCY MEDICINE

## 2018-08-31 RX ORDER — ONDANSETRON 2 MG/ML
4 INJECTION INTRAMUSCULAR; INTRAVENOUS ONCE
Status: COMPLETED | OUTPATIENT
Start: 2018-08-31 | End: 2018-08-31

## 2018-08-31 NOTE — ED PROVIDER NOTES
Patient Seen in: [facility] AND [facility] Emergency Department    History   Patient presents with:  Hypertension (cardiovascular)    Stated Complaint: HTN    HPI    Patient is a 16-year-old Palauan-speaking female who arrives with her daughter by EMS for elevat above.    Physical Exam   ED Triage Vitals [08/31/18 0201]  BP: (!) 168/91  Pulse: 63  Resp: 18  Temp: 97.8 °F (36.6 °C)  Temp src: Oral  SpO2: 97 %  O2 Device: None (Room air)    Current:BP (!) 182/75   Pulse 60   Temp 97.8 °F (36.6 °C) (Oral)   Resp 18 Pt states she feels well in ED. Advised close f/u with PCP. Lab work unremarkable.             Disposition and Plan     Clinical Impression:  Essential hypertension  (primary encounter diagnosis)    Disposition:  Discharge  8/31/2018  2:54 am    Follow-up:

## 2018-09-28 RX ORDER — METOPROLOL SUCCINATE 100 MG/1
TABLET, EXTENDED RELEASE ORAL
Qty: 90 TABLET | Refills: 0 | Status: SHIPPED | OUTPATIENT
Start: 2018-09-28 | End: 2018-11-14

## 2018-09-29 NOTE — TELEPHONE ENCOUNTER
Hypertensive Medications  Protocol Criteria:  · Appointment scheduled in the past 6 months or in the next 3 months  · BMP or CMP in the past 12 months  · Creatinine result < 2  Recent Outpatient Visits            2 months ago Mass of soft tissue    [place]

## 2018-11-14 ENCOUNTER — HOSPITAL ENCOUNTER (EMERGENCY)
Facility: HOSPITAL | Age: 78
Discharge: ANOTHER HOSPITAL | End: 2018-11-14
Attending: EMERGENCY MEDICINE
Payer: MEDICARE

## 2018-11-14 ENCOUNTER — HOSPITAL ENCOUNTER (INPATIENT)
Facility: HOSPITAL | Age: 78
LOS: 7 days | Discharge: NURSING, REHAB OR LONG-TERM CARE | DRG: 041 | End: 2018-11-21
Attending: EMERGENCY MEDICINE | Admitting: HOSPITALIST
Payer: MEDICARE

## 2018-11-14 ENCOUNTER — APPOINTMENT (OUTPATIENT)
Dept: CT IMAGING | Facility: HOSPITAL | Age: 78
End: 2018-11-14
Attending: EMERGENCY MEDICINE
Payer: MEDICARE

## 2018-11-14 ENCOUNTER — APPOINTMENT (OUTPATIENT)
Dept: GENERAL RADIOLOGY | Facility: HOSPITAL | Age: 78
End: 2018-11-14
Attending: EMERGENCY MEDICINE
Payer: MEDICARE

## 2018-11-14 VITALS
RESPIRATION RATE: 20 BRPM | TEMPERATURE: 98 F | HEIGHT: 64 IN | OXYGEN SATURATION: 100 % | WEIGHT: 160 LBS | BODY MASS INDEX: 27.31 KG/M2 | SYSTOLIC BLOOD PRESSURE: 160 MMHG | DIASTOLIC BLOOD PRESSURE: 125 MMHG | HEART RATE: 80 BPM

## 2018-11-14 DIAGNOSIS — I63.9 CEREBROVASCULAR ACCIDENT (CVA), UNSPECIFIED MECHANISM (HCC): Primary | ICD-10-CM

## 2018-11-14 DIAGNOSIS — I63.9 ACUTE CVA (CEREBROVASCULAR ACCIDENT) (HCC): Primary | ICD-10-CM

## 2018-11-14 PROBLEM — I10 ESSENTIAL HYPERTENSION: Status: ACTIVE | Noted: 2018-11-14

## 2018-11-14 PROBLEM — E78.00 PURE HYPERCHOLESTEROLEMIA: Chronic | Status: ACTIVE | Noted: 2018-11-14

## 2018-11-14 PROCEDURE — 70496 CT ANGIOGRAPHY HEAD: CPT | Performed by: EMERGENCY MEDICINE

## 2018-11-14 PROCEDURE — 93005 ELECTROCARDIOGRAM TRACING: CPT

## 2018-11-14 PROCEDURE — 99223 1ST HOSP IP/OBS HIGH 75: CPT | Performed by: OTHER

## 2018-11-14 PROCEDURE — 36415 COLL VENOUS BLD VENIPUNCTURE: CPT

## 2018-11-14 PROCEDURE — 99285 EMERGENCY DEPT VISIT HI MDM: CPT

## 2018-11-14 PROCEDURE — 80048 BASIC METABOLIC PNL TOTAL CA: CPT | Performed by: EMERGENCY MEDICINE

## 2018-11-14 PROCEDURE — 84484 ASSAY OF TROPONIN QUANT: CPT | Performed by: EMERGENCY MEDICINE

## 2018-11-14 PROCEDURE — 85730 THROMBOPLASTIN TIME PARTIAL: CPT | Performed by: EMERGENCY MEDICINE

## 2018-11-14 PROCEDURE — 99223 1ST HOSP IP/OBS HIGH 75: CPT | Performed by: HOSPITALIST

## 2018-11-14 PROCEDURE — 70450 CT HEAD/BRAIN W/O DYE: CPT | Performed by: EMERGENCY MEDICINE

## 2018-11-14 PROCEDURE — 85025 COMPLETE CBC W/AUTO DIFF WBC: CPT | Performed by: EMERGENCY MEDICINE

## 2018-11-14 PROCEDURE — 82962 GLUCOSE BLOOD TEST: CPT

## 2018-11-14 PROCEDURE — 93010 ELECTROCARDIOGRAM REPORT: CPT | Performed by: EMERGENCY MEDICINE

## 2018-11-14 PROCEDURE — 85610 PROTHROMBIN TIME: CPT | Performed by: EMERGENCY MEDICINE

## 2018-11-14 PROCEDURE — 70498 CT ANGIOGRAPHY NECK: CPT | Performed by: EMERGENCY MEDICINE

## 2018-11-14 RX ORDER — SODIUM PHOSPHATE, DIBASIC AND SODIUM PHOSPHATE, MONOBASIC 7; 19 G/133ML; G/133ML
1 ENEMA RECTAL ONCE AS NEEDED
Status: DISCONTINUED | OUTPATIENT
Start: 2018-11-14 | End: 2018-11-21

## 2018-11-14 RX ORDER — POLYETHYLENE GLYCOL 3350 17 G/17G
17 POWDER, FOR SOLUTION ORAL DAILY PRN
Status: DISCONTINUED | OUTPATIENT
Start: 2018-11-14 | End: 2018-11-21

## 2018-11-14 RX ORDER — SODIUM CHLORIDE 9 MG/ML
INJECTION, SOLUTION INTRAVENOUS CONTINUOUS
Status: ACTIVE | OUTPATIENT
Start: 2018-11-14 | End: 2018-11-16

## 2018-11-14 RX ORDER — LABETALOL HYDROCHLORIDE 5 MG/ML
10 INJECTION, SOLUTION INTRAVENOUS EVERY 10 MIN PRN
Status: COMPLETED | OUTPATIENT
Start: 2018-11-14 | End: 2018-11-16

## 2018-11-14 RX ORDER — FAMOTIDINE 20 MG/1
20 TABLET ORAL DAILY
Status: DISCONTINUED | OUTPATIENT
Start: 2018-11-14 | End: 2018-11-21

## 2018-11-14 RX ORDER — PHENYLEPHRINE HCL IN 0.9% NACL 50MG/250ML
PLASTIC BAG, INJECTION (ML) INTRAVENOUS CONTINUOUS PRN
Status: DISCONTINUED | OUTPATIENT
Start: 2018-11-14 | End: 2018-11-16 | Stop reason: ALTCHOICE

## 2018-11-14 RX ORDER — ATORVASTATIN CALCIUM 80 MG/1
80 TABLET, FILM COATED ORAL NIGHTLY
Status: DISCONTINUED | OUTPATIENT
Start: 2018-11-14 | End: 2018-11-21

## 2018-11-14 RX ORDER — ACETAMINOPHEN 650 MG/1
650 SUPPOSITORY RECTAL EVERY 4 HOURS PRN
Status: DISCONTINUED | OUTPATIENT
Start: 2018-11-14 | End: 2018-11-21

## 2018-11-14 RX ORDER — FAMOTIDINE 10 MG/ML
20 INJECTION, SOLUTION INTRAVENOUS DAILY
Status: DISCONTINUED | OUTPATIENT
Start: 2018-11-14 | End: 2018-11-21

## 2018-11-14 RX ORDER — PAROXETINE 10 MG/1
10 TABLET, FILM COATED ORAL EVERY MORNING
COMMUNITY
End: 2019-01-02

## 2018-11-14 RX ORDER — ASPIRIN 325 MG
325 TABLET ORAL DAILY
Status: DISCONTINUED | OUTPATIENT
Start: 2018-11-14 | End: 2018-11-21

## 2018-11-14 RX ORDER — MORPHINE SULFATE 4 MG/ML
2 INJECTION, SOLUTION INTRAMUSCULAR; INTRAVENOUS EVERY 2 HOUR PRN
Status: DISCONTINUED | OUTPATIENT
Start: 2018-11-14 | End: 2018-11-21

## 2018-11-14 RX ORDER — ONDANSETRON 2 MG/ML
4 INJECTION INTRAMUSCULAR; INTRAVENOUS EVERY 6 HOURS PRN
Status: DISCONTINUED | OUTPATIENT
Start: 2018-11-14 | End: 2018-11-21

## 2018-11-14 RX ORDER — FLUTICASONE PROPIONATE 50 MCG
2 SPRAY, SUSPENSION (ML) NASAL DAILY
COMMUNITY
End: 2020-08-05

## 2018-11-14 RX ORDER — METOCLOPRAMIDE HYDROCHLORIDE 5 MG/ML
10 INJECTION INTRAMUSCULAR; INTRAVENOUS EVERY 8 HOURS PRN
Status: DISCONTINUED | OUTPATIENT
Start: 2018-11-14 | End: 2018-11-21

## 2018-11-14 RX ORDER — ASPIRIN 300 MG
300 SUPPOSITORY, RECTAL RECTAL ONCE
Status: COMPLETED | OUTPATIENT
Start: 2018-11-14 | End: 2018-11-14

## 2018-11-14 RX ORDER — METOPROLOL SUCCINATE 100 MG/1
100 TABLET, EXTENDED RELEASE ORAL DAILY
Status: ON HOLD | COMMUNITY
End: 2018-11-21

## 2018-11-14 RX ORDER — BISACODYL 10 MG
10 SUPPOSITORY, RECTAL RECTAL
Status: DISCONTINUED | OUTPATIENT
Start: 2018-11-14 | End: 2018-11-21

## 2018-11-14 RX ORDER — MORPHINE SULFATE 4 MG/ML
1 INJECTION, SOLUTION INTRAMUSCULAR; INTRAVENOUS EVERY 2 HOUR PRN
Status: DISCONTINUED | OUTPATIENT
Start: 2018-11-14 | End: 2018-11-21

## 2018-11-14 RX ORDER — DOCUSATE SODIUM 100 MG/1
100 CAPSULE, LIQUID FILLED ORAL 2 TIMES DAILY
Status: DISCONTINUED | OUTPATIENT
Start: 2018-11-14 | End: 2018-11-16 | Stop reason: SDUPTHER

## 2018-11-14 RX ORDER — SENNOSIDES 8.6 MG
17.2 TABLET ORAL NIGHTLY
Status: DISCONTINUED | OUTPATIENT
Start: 2018-11-14 | End: 2018-11-21

## 2018-11-14 RX ORDER — ASPIRIN 300 MG
300 SUPPOSITORY, RECTAL RECTAL DAILY
Status: DISCONTINUED | OUTPATIENT
Start: 2018-11-14 | End: 2018-11-21

## 2018-11-14 RX ORDER — ACETAMINOPHEN 325 MG/1
650 TABLET ORAL EVERY 4 HOURS PRN
Status: DISCONTINUED | OUTPATIENT
Start: 2018-11-14 | End: 2018-11-21

## 2018-11-14 NOTE — PROGRESS NOTES
[facility] [facility] Initial Evaluation    Migdalia Contreras Patient Status:  Emergency    5/10/1940 MRN [id number]   Location [address] Attending No att. providers found   Hosp Day # 0 PCP Rebecca Patel MD     REASON FOR HISTORY:  family history includes Diabetes in an other family member; Hypertension in an other family member; Lipids in an other family member. SOCIAL HISTORY:   reports that  has never smoked.  she has never used smokeless tobacco. She reports that she evidence of dissection. No vascular malformation detected. Scattered paranasal sinus disease. CT Brain  No acute intracranial process. There are mild microvascular white matter ischemic changes, likely relate to long-standing HTN and/or DM.   Juany Au

## 2018-11-14 NOTE — ED NOTES
Returned from CTA- daughter at bedside. Pt remains non-verbal but moves her mouth in an attempt to speak. Able to hold up R arm but cannot grasp.

## 2018-11-14 NOTE — H&P
[facility]  History and Physical     Kymberly Li Patient Status:  Emergency    5/10/1940 MRN [id number]   Location [facility] Attending Leigh Gutierres MD   Hosp Day # 0 PCP Rebecca Patel MD     Chief Complaint: r Macular degeneration Neg        Allergies:   Lisinopril              Coughing    Medications:    No current facility-administered medications on file prior to encounter.    Current Outpatient Medications on File Prior to Encounter:  PARoxetine HCl 10 MG Ora Equal pulses. Chest and Back: No tenderness or deformity. Abdomen: Soft, nontender, nondistended. Positive bowel sounds. No rebound, guarding or organomegaly. Neurologic: No focal neurological deficits. CNII-XII grossly intact.   Musculoskeletal: Moves

## 2018-11-14 NOTE — PROGRESS NOTES
NURSING ADMISSION NOTE      Patient admitted via Cart  Oriented to room. Safety precautions initiated. Bed in low position. Call light in reach.     Pt arrived on unit at [phone] from ER- Report from [address] [place] [address] known normal Monday   NIH-21 but very dif

## 2018-11-14 NOTE — ED NOTES
Presents to ER with EMS after a neighbor became concerned because they had not heard from pt. Unknown last well time. Upon arrival to ER, pt was immediately evaluated by Dr Lily Saenz and then transported to [facility] with SILVIA Gregg.  She is non-verbal. + R facial

## 2018-11-14 NOTE — ED PROVIDER NOTES
Patient Seen in: [facility] Emergency Department    History   Patient presents with:  Stroke (neurologic)    Stated Complaint: stroke transfer from [facility]    Patient is a 73-year-old female comes emergency room for evaluation of stroke symp tobacco: Never Used    Alcohol use: No    Drug use: No      Review of Systems    Positive for stated complaint: stroke transfer from [place]  Other systems are as noted in HPI. Constitutional and vital signs reviewed.       All other systems reviewed and she is able to lift it off the bed.     ED Course   Labs Reviewed - No data to display       Ct Stroke Brain (no Iv)(cpt=70450)    Result Date: 11/14/2018  PROCEDURE: CT STROKE BRAIN (NO IV) (CPT=70450)  COMPARISON: [facility], [address] 11/14/2018  PROCEDURE: CT STROKE CTA BRAIN/CTA NECK (WIV) (IHU=87906/71059)  COMPARISON: [facility], C/Eevlio Staley Final, 3/19/2009, 12:12. [address], 5/11/2010, 5:58.   [facility] RIGHT VERTEBRAL: Diffusely diminutive in caliber relative to the left. Flow identified. No significant stenosis. LEFT VERTEBRAL: Dominant. Flow identified. No significant stenosis. BASILAR: Flow identified. No significant stenosis.  POSTERIOR CEREBRALS: Harshal ago.  She will be admitted to cardiac telemetry. Case discussed with family and patient.   Reviewed labs  Admission disposition: 11/14/2018 12:19 PM                 Disposition and Plan     Clinical Impression:  Cerebrovascular accident (CVA), unspecified

## 2018-11-14 NOTE — ED PROVIDER NOTES
Patient Seen in: [facility] Emergency Department    History   Patient presents with:  CVA    Stated Complaint: R/O CVA    HPI    70 yo female with limited history.  According to EMS they were called by neighbors because patient was found unable to 72.6 kg   SpO2 98%   BMI 27.46 kg/m²         Physical Exam   Constitutional: She appears well-developed and well-nourished. No distress. HENT:   Head: Normocephalic and atraumatic.    Mouth/Throat: Oropharynx is clear and moist.   Eyes: Conjunctivae are n for these tests on the individual orders.    URINALYSIS WITH CULTURE REFLEX   BASIC METABOLIC PANEL (8)   TROPONIN I   PROTHROMBIN TIME (PT)   PTT, ACTIVATED   RAINBOW DRAW BLUE   RAINBOW DRAW LAVENDER   RAINBOW DRAW DARK GREEN   RAINBOW DRAW LIGHT GREEN

## 2018-11-14 NOTE — CONSULTS
[id number] [facility] consultation           Sunday Ramsey Patient Status:  Emergency    5/10/1940 MRN [id number]   Location [address] Attending No att. providers found   Hosp Day # 0 PCP Rebecca Patel MD      REASON F Right 01/17/2018     LILA   • YAG CAPSULOTOMY - OS - LEFT EYE Left 01/24/2018     LILA         FAMILY HISTORY:  family history includes Diabetes in an other family member; Hypertension in an other family member; Lipids in an other family member.  Anjana Ornelas [place]:  CTA Head and Neck  No hemodynamically significant stenosis or occlusion of the anterior or posterior circulation of the head and neck. No evidence of dissection. No vascular malformation detected.   Scattered paranasal sinus disease.     CT Bra

## 2018-11-14 NOTE — ED NOTES
Patient's family came to RN station stating patient was pointing to left side of her face and acknowledging when they were asking her about it. They state patient is communicating that there is numbness on the left side of her face.  No other acute findings

## 2018-11-15 ENCOUNTER — APPOINTMENT (OUTPATIENT)
Dept: MRI IMAGING | Facility: HOSPITAL | Age: 78
DRG: 041 | End: 2018-11-15
Attending: NURSE PRACTITIONER
Payer: MEDICARE

## 2018-11-15 ENCOUNTER — APPOINTMENT (OUTPATIENT)
Dept: ULTRASOUND IMAGING | Facility: HOSPITAL | Age: 78
DRG: 041 | End: 2018-11-15
Attending: Other
Payer: MEDICARE

## 2018-11-15 ENCOUNTER — APPOINTMENT (OUTPATIENT)
Dept: CV DIAGNOSTICS | Facility: HOSPITAL | Age: 78
DRG: 041 | End: 2018-11-15
Attending: NURSE PRACTITIONER
Payer: MEDICARE

## 2018-11-15 PROCEDURE — 93880 EXTRACRANIAL BILAT STUDY: CPT | Performed by: OTHER

## 2018-11-15 PROCEDURE — 70551 MRI BRAIN STEM W/O DYE: CPT | Performed by: NURSE PRACTITIONER

## 2018-11-15 PROCEDURE — 93306 TTE W/DOPPLER COMPLETE: CPT | Performed by: NURSE PRACTITIONER

## 2018-11-15 PROCEDURE — 99233 SBSQ HOSP IP/OBS HIGH 50: CPT | Performed by: OTHER

## 2018-11-15 PROCEDURE — 99232 SBSQ HOSP IP/OBS MODERATE 35: CPT | Performed by: HOSPITALIST

## 2018-11-15 RX ORDER — PAROXETINE 10 MG/1
10 TABLET, FILM COATED ORAL EVERY MORNING
Status: DISCONTINUED | OUTPATIENT
Start: 2018-11-15 | End: 2018-11-21

## 2018-11-15 RX ORDER — HYDRALAZINE HYDROCHLORIDE 20 MG/ML
10 INJECTION INTRAMUSCULAR; INTRAVENOUS EVERY 6 HOURS PRN
Status: DISCONTINUED | OUTPATIENT
Start: 2018-11-15 | End: 2018-11-21

## 2018-11-15 RX ORDER — LOSARTAN POTASSIUM 50 MG/1
50 TABLET ORAL 2 TIMES DAILY
Status: DISCONTINUED | OUTPATIENT
Start: 2018-11-15 | End: 2018-11-21

## 2018-11-15 RX ORDER — HEPARIN SODIUM 5000 [USP'U]/ML
5000 INJECTION, SOLUTION INTRAVENOUS; SUBCUTANEOUS EVERY 8 HOURS SCHEDULED
Status: DISCONTINUED | OUTPATIENT
Start: 2018-11-15 | End: 2018-11-21

## 2018-11-15 RX ORDER — METOPROLOL SUCCINATE 50 MG/1
100 TABLET, EXTENDED RELEASE ORAL
Status: DISCONTINUED | OUTPATIENT
Start: 2018-11-15 | End: 2018-11-16

## 2018-11-15 RX ORDER — FLUTICASONE PROPIONATE 50 MCG
2 SPRAY, SUSPENSION (ML) NASAL DAILY
Status: DISCONTINUED | OUTPATIENT
Start: 2018-11-15 | End: 2018-11-21

## 2018-11-15 NOTE — SLP NOTE
ADULT SWALLOWING EVALUATION    ASSESSMENT    ASSESSMENT/OVERALL IMPRESSION:  B/S swallow eval completed upon receipt of MD order.  Per MD note  Chief Complaint: rt sided weakness  History of Present Illness: Kymberly Li is a 69year old female with hx of Function Score: Moderate    RECOMMENDATIONS   Diet Recommendations - Solids: NPO  Diet Recommendations - Liquid: NPO   Oral care as tolerated.   VFSS 11/16/18    Medication Administration Recommendations: Non-oral  Treatment Plan/Recommendations: Bertha Murdock Functional  Symmetry: Reduced right facial  Strength: Reduced right facial;Reduced right lingual  Tone: Reduced right facial;Reduced right lingual  Range of Motion: Reduced right facial;Reduced right lingual  Rate of Motion: Reduced    Voice Quality: Weak

## 2018-11-15 NOTE — PHYSICAL THERAPY NOTE
PHYSICAL THERAPY EVALUATION - INPATIENT     Room Number: 7724/4182-H  Evaluation Date: 11/15/2018  Type of Evaluation: Initial  Physician Order: PT Eval and Treat    Presenting Problem: CVA  Reason for Therapy: Mobility Dysfunction and Discharge Plan 7/20/2017    Performed by Jackalyn Duane, MD at [facility]   • YAG CAPSULOTOMY - OD - RIGHT EYE Right 01/17/2018    LILA   • YAG CAPSULOTOMY - OS - LEFT EYE Left 01/24/2018    LILA       HOME SITUATION  Type of Home: Apartment   H bedclothes, sheets and blankets)?: A Little   -   Sitting down on and standing up from a chair with arms (e.g., wheelchair, bedside commode, etc.): A Little   -   Moving from lying on back to sitting on the side of the bed?: A Little   How much help from a concerns addressed;SCDs in place; Family present; Alarm set    ASSESSMENT   Patient is a 69year old female admitted on 11/14/2018 for acute CVA. Pertinent comorbidities and personal factors impacting therapy include CVA,HTN, DM.   In this PT evaluation, the established on 11/15/2018

## 2018-11-15 NOTE — PLAN OF CARE
Problem: Impaired Swallowing  Goal: Minimize aspiration risk  Interventions:  NPO per B/S swallow. Oral care.   VFSS 11/16/18  Meds via non-oral route    Outcome: Progressing

## 2018-11-15 NOTE — PLAN OF CARE
Diabetes/Glucose Control    • Glucose maintained within prescribed range Progressing        Impaired Communication    • Patient will achieve maximal communication potential Progressing        Impaired Swallowing    • Minimize aspiration risk Progressing

## 2018-11-15 NOTE — PROGRESS NOTES
[facility]  Progress Note     Trudy Ruts Patient Status:  Inpatient    5/10/1940 MRN [id number]   [facility] 3NE-A Attending [facility] Day # 1 PCP Rebecca Patel MD     Chief Complaint: Rt sided weakness PARoxetine HCl  10 mg Oral QAM   • atorvastatin  80 mg Oral Nightly   • aspirin  300 mg Rectal Daily    Or   • aspirin  325 mg Oral Daily   • Senna  17.2 mg Oral Nightly   • docusate sodium  100 mg Oral BID   • famoTIDine  20 mg Oral Daily    Or   • famoTI

## 2018-11-15 NOTE — CM/SW NOTE
PT recommending acute rehab, will obtain orders for physiatry consult and notify [facility] liaison of consult.     Meghann Moon RN,   Phone [phone]  Pager [phone]

## 2018-11-15 NOTE — CONSULTS
[facility] Cardiology  Report of Consultation    Ken Cutler Patient Status:  Inpatient    5/10/1940 MRN [id number]   [facility] 3NE-A Attending [facility] Day # 1 PCP Rebecca Patel MD     [facility] famoTIDine  20 mg Intravenous Daily       Continuous Infusion:   • sodium chloride 75 mL/hr at 11/15/18 1432   • NiCARdipine     • phenylephrine         PRN Medications:   hydrALAzine HCl, acetaminophen **OR** acetaminophen, morphINE sulfate **OR** morphIN (36.8 °C)     Wt Readings from Last 3 Encounters:  11/14/18 : 117 lb 1.6 oz (53.1 kg)  11/14/18 : 117 lb 1 oz (53.1 kg)  11/14/18 : 160 lb (72.6 kg)          General: Well developed, well nourished female. Pt is in no acute distress.   HEENT:   Normocephal Dyslipidemia  4. DM (diet controlled)      Plan:  1. Will read echo with bubble study. 2. No afib/flutter on telemetry, but I agree an event monitor at discharge would be reasonable. 3. HTN: BP still elevated, defer given acute event to Neurology.   4. Erasmo Loyola

## 2018-11-15 NOTE — PROGRESS NOTES
Spoke with Daughter over the phone who was with the neighbor that called the ambulance.  Pt lives in apartment- the neighbors were at the door and the pt was able to come down the stairs to answer the door- the patients noticed slurred speech and unusual mo

## 2018-11-15 NOTE — PAYOR COMM NOTE
--------------  ADMISSION REVIEW         11/14  ED      Chief Complaint: rt sided weakness     History of Present Illness: Holley Kirk is a 69year old female with hx of DM, HTN, DL     was last seen on Monday by family.  Pt was seen found by neighbors th ganglia.         ASSESSMENT / PLAN:      1. Acute stroke- with rt sided weakness. On stroke protocol. Neurology on consult. 2. echo ordered. Continue ASA and statin. 3. Hypertension- Will hold home meds while on stroke protocol.     4. Diabetes- Diet con

## 2018-11-15 NOTE — OCCUPATIONAL THERAPY NOTE
IP OT attempt to see patient for therapeutic treatment. Patient on bed rest, per protocol, starting 11/14/18 for 24 hours from time of 1614, per EMR/RN.  Will attempt again as able and as appropriate when Bed rest order is lifted/activity orders are upgrad

## 2018-11-15 NOTE — PHYSICAL THERAPY NOTE
PT consult received per Stroke Protocol. Pt is on bedrest x 24 hours, starting at [phone] on 11/14/18. Will continue to follow and see when activity level upgraded.

## 2018-11-16 ENCOUNTER — APPOINTMENT (OUTPATIENT)
Dept: GENERAL RADIOLOGY | Facility: HOSPITAL | Age: 78
DRG: 041 | End: 2018-11-16
Attending: HOSPITALIST
Payer: MEDICARE

## 2018-11-16 PROCEDURE — 0DH67UZ INSERTION OF FEEDING DEVICE INTO STOMACH, VIA NATURAL OR ARTIFICIAL OPENING: ICD-10-PCS | Performed by: HOSPITALIST

## 2018-11-16 PROCEDURE — 99232 SBSQ HOSP IP/OBS MODERATE 35: CPT | Performed by: HOSPITALIST

## 2018-11-16 PROCEDURE — 99233 SBSQ HOSP IP/OBS HIGH 50: CPT | Performed by: OTHER

## 2018-11-16 PROCEDURE — 71045 X-RAY EXAM CHEST 1 VIEW: CPT | Performed by: HOSPITALIST

## 2018-11-16 PROCEDURE — 74230 X-RAY XM SWLNG FUNCJ C+: CPT | Performed by: HOSPITALIST

## 2018-11-16 RX ORDER — DOCUSATE SODIUM 100 MG/1
100 CAPSULE, LIQUID FILLED ORAL 2 TIMES DAILY
Status: DISCONTINUED | OUTPATIENT
Start: 2018-11-16 | End: 2018-11-21

## 2018-11-16 NOTE — OCCUPATIONAL THERAPY NOTE
OCCUPATIONAL THERAPY EVALUATION - INPATIENT     Room Number: 7894/4495-C  Evaluation Date: 11/16/2018  Type of Evaluation: Initial  Presenting Problem: L frontal/parietal/occipital infarct    Physician Order: IP Consult to Occupational Therapy  Reason for Jackalyn Duane, MD at [facility]   • YAG CAPSULOTOMY - OD - RIGHT EYE Right 01/17/2018    LILA   • YAG CAPSULOTOMY - OS - LEFT EYE Left 01/24/2018    LILA       OCCUPATIONAL PROFILE    HOME SITUATION  Type of Home: [address]  Wrist flexion resting in flexed position, no extension noted  Right  lightly in fist position, no extension of fingers    Upper extremity strength is within functional limits except for the following;  Right Wrist flexion  2-/5  Right Wrist extension keeping her head to the L and gaze preference to L. Max verbal cueing and increased time to turn her head to R side. Visually, pt was not able to maintain visual attention to R side, even with max cueing.    Pt tracks an item to R side with both eyes for a below her previous functional level and would benefit from skilled inpatient OT to address the above deficits, maximizing patient’s ability to return safely to her prior level of function.     Patient Complexity  Occupational Profile/Medical History HIGH -

## 2018-11-16 NOTE — DIETARY NOTE
NUTRITION INITIAL ASSESSMENT    Pt is at moderate nutrition risk. Pt does not meet malnutrition criteria.     NUTRITION DIAGNOSIS/PROBLEM:    Inadequate oral intake related to decreased ability to consume sufficient energy as evidenced by dysphagia, NGT fee Maintain lean body mass  5. Tolerance of enteral nutrition without signs/symptoms of intolerance (abdominal discomfort/distention)  6.  Alternative means of nutrition at goal to meet 100% patient nutrition prescription    MEDICATIONS:  Noted    LABS:  Noted

## 2018-11-16 NOTE — PROGRESS NOTES
11/15/18 1814   Clinical Encounter Type   Visited With Family  (Patient's children, and grandchildren)   Continue Visiting Yes  (Visit with patient as patient not available)   Patient's Supportive Strategies/Resources Offered non anxious presence and em

## 2018-11-16 NOTE — PHYSICAL THERAPY NOTE
PHYSICAL THERAPY TREATMENT NOTE - INPATIENT    Room Number: 8579/2336-Z     Session: 1   Number of Visits to Meet Established Goals: 5    Presenting Problem: CVA     History related to current admission:      Pt is 69year old female admitted on 11/14/201 CAPSULOTOMY - OD - RIGHT EYE Right 01/17/2018    LILA   • YAG CAPSULOTOMY - OS - LEFT EYE Left 01/24/2018    LILA       SUBJECTIVE  Pt alert, impulsive, eager to move    Patient’s self-stated goal is not stated-pt is aphasic    OBJECTIVE  Precautions: Bed/ch inattention to left side, maximal reinforcement for visual tracking to right. Gait is ataxic, noted consistent step through, pt is impulsive and with impaired insight into deficits and impaired safety awareness.  Pt with episodes of LOB to left, requiring m Goal #1 Patient is able to demonstrate supine - sit EOB @ level: supervision      Goal #2 Patient is able to demonstrate transfers Sit to/from Stand at assistance level: minimum assistance  Met 11/16/18; new: supervision      Goal #3 Patient is able to a

## 2018-11-16 NOTE — DIETARY NOTE
Nutrition Short Note    Consult received for NGT feeds. Recommend:  Glucerna 1.5 @ 25ml/hr. Advance 10ml q4 to goal 45ml/hr x 24hrs.   200ml h20 flush q6h    Total intake to provide 1080ml, 1620kcals (28kcals/kg), 89g pro (1.5g/kg) and 1621ml free h20 (28ml

## 2018-11-16 NOTE — VIDEO SWALLOW STUDY NOTE
ADULT VIDEOFLUOROSCOPIC SWALLOWING STUDY    Admission Date: 11/14/2018  Evaluation Date: 11/16/18  Radiologist: Dr. Girma Figueroa   Diet Recommendations - Solids: Puree  Diet Recommendations - Liquid: Honey thick    Further Follow-up:  Follow U cerebral white matter. Old lacunar infarcts in the basal ganglia. Reason for Referral: Stroke protocol  Clinical s/s aspiration during bedside swallow 11/15    Family/Patient Goals:   To eat    ASSESSMENT   DYSPHAGIA ASSESSMENT  Test completed in c Yes  PUREE  Oral Phase of Swallow (VFSS - Puree): (premature spillage)  Triggered at: Valleculae  Premature Spillage to: Valleculae  Delay (seconds): (2)  Residue Severity, Location: (no remarkable residue)  Laryngeal Penetration: None  Tracheal Aspiration % accuracy over 2-3 session(s). New goal   Goal #2 The patient/family/caregiver will demonstrate understanding and implementation of aspiration precautions and swallow strategies independently over 2-3 session(s).    New goal   Goal #3 The patient will util

## 2018-11-16 NOTE — SLP NOTE
SPEECH DAILY NOTE - INPATIENT    ASSESSMENT & PLAN   ASSESSMENT  Returned at 1200 to educate family re: VFSS results and aspiration precautions and to ensure pt tolerance of modified diet.    Pt exhibiting reduced attention to task with meal, requiring max Recommendations: Non-oral    Patient Experiencing Pain: No       Discharge Recommendations  Discharge Recommendations/Plan: Acute rehabilitation    Treatment Plan  Treatment Plan/Recommendations: Dysphagia therapy;Communication evaluation    Interdisciplin

## 2018-11-16 NOTE — PROGRESS NOTES
[facility] Cardiology Progress Note        Elder Paget Patient Status:  Inpatient    5/10/1940 MRN [id number]   [facility] 3NE-A Attending [facility] Day # 2 PCP Rebecca Patel MD     Subjective: Cath:      Labs:  HEM:  Recent Labs   Lab  11/14/18   1036   WBC  7.9   HGB  14.7   PLT  133*       Chem:  Recent Labs   Lab  11/14/18   1036  11/15/18   0600   NA  137   --    K  3.3  3.9   CL  101   --    CO2  24   --    BUN  11   --    CREATSERUM  0.97

## 2018-11-16 NOTE — CM/SW NOTE
Awaiting pmr consult, if appropriate for acute, spoke with daughter Patricio Leiva and multiple family memebers at bedside, all in agreement they would like [facility] - call to liaison to make aware of facility choice, will need to get insurance auth.     [place] [facility]

## 2018-11-16 NOTE — PROGRESS NOTES
[address] [facility] Progress Note    Gina Mcgrath Patient Status:  Inpatient    5/10/1940 MRN [id number]   [facility] 3NE-A Attending [address] [address] [place] Day # 2 PCP Rebecca Patel MD         Neurology Attending note 58\", weight 128 lb 9.6 oz, SpO2 97 %, not currently breastfeeding.     Neurologic:   Mental status: alert and oriented to self, place, month  Speech: expressive aphasia, says a few more words today  Memory and comprehension: follows simple commands in Span stroke like symptoms.  Due to questionable last known normal, patient is not a candidate for any acute intervention deemed by acute stroke service/Dr. Kt Encinas.   HTN  DM, controlled by diet  Depression     Plan:  Stroke order set  Slowly lower BP, goals 120

## 2018-11-16 NOTE — PROGRESS NOTES
[facility]  Progress Note     Jodie Brody Patient Status:  Inpatient    5/10/1940 MRN [id number]   [facility]A [facility] Rebecca Patel MD     Chief Complaint: Rt sided weakness mg Oral BID   • Metoprolol Succinate ER  100 mg Oral Daily Beta Blocker   • PARoxetine HCl  10 mg Oral QAM   • Heparin Sodium (Porcine)  5,000 Units Subcutaneous Q8H [facility]   • atorvastatin  80 mg Oral Nightly   • aspirin  300 mg Rectal Daily    Or   • aspiri

## 2018-11-16 NOTE — SLP NOTE
Preliminary VFSS results. Pt presents with moderate oropharyngeal dysphagia. Mild aspiration of nectar liquids with reflexive cough present. No penetration or aspiration puree or honey. Initiate puree/honey thick liquids.   Strict aspiration precautions

## 2018-11-16 NOTE — CONSULTS
Safia Patient Status:  Inpatient    5/10/1940 MRN [id number]   [facility] 3NE-A Attending [facility] Day # 2 PCP Rebecca Patel MD     Patient Identification  Zoe Baig is a 69year old f normal in size. The left atrial volume was  mildly increased. Right ventricle:  The cavity size was normal. Systolic function was normal.  Right atrium:  The atrium was normal in size.   Mitral valve:   Structurally normal valve.   Leaflet separation was n Fluticasone Propionate (FLONASE) 50 MCG/ACT nasal spray 2 spray 2 spray Each Nare Daily   Losartan Potassium (COZAAR) tab 50 mg 50 mg Oral BID   Metoprolol Succinate ER (Toprol XL) 24 hr tab 100 mg 100 mg Oral Daily Beta Blocker   PARoxetine HCl (P mg 10 mg Intravenous Q8H PRN   famoTIDine (PEPCID) tab 20 mg 20 mg Oral Daily   Or      famoTIDine (PEPCID) injection 20 mg 20 mg Intravenous Daily   [COMPLETED] potassium chloride 40 mEq in sodium chloride 0.9 % 250 mL IVPB 40 mEq Intravenous Once   influ No cyanosis in all extremities. Abdomen:   No tenderness guarding or rigidity. Liver and spleen are not enlarged. Bowel sounds present. Musculoskeletal:     Right Upper Extremity:  Strength is 3-4. ROM WNL.    Left Upper Extremity:  [place] patient has fair potential to achieve the goal to return home at [place] A level of function. Advance directives reviewed and noted. Would be happy to reassess should medical and/or functional status change.    Will follow

## 2018-11-17 PROCEDURE — 99232 SBSQ HOSP IP/OBS MODERATE 35: CPT | Performed by: HOSPITALIST

## 2018-11-17 PROCEDURE — 99233 SBSQ HOSP IP/OBS HIGH 50: CPT | Performed by: OTHER

## 2018-11-17 NOTE — PLAN OF CARE
Diabetes/Glucose Control     • Glucose maintained within prescribed range Progressing           Impaired Activities of Daily Living     • Achieve highest/safest level of independence in self care Progressing           Impaired Communication     •

## 2018-11-17 NOTE — PLAN OF CARE
Problem: Impaired Communication  Goal: Patient will achieve maximal communication potential  Interventions:  Aphasia and verbal apraxia. Communication pictures provided.   Family educated on strategies to improve communication and reduce frustration (Q's i

## 2018-11-17 NOTE — SLP NOTE
SPEECH DAILY NOTE - INPATIENT    ASSESSMENT & PLAN   ASSESSMENT  Speech therapy session this date focussed primarily on discussing VFSS results and subsequent NPO recommendation due to aspiration risk.   Discussed that NG use is a temporary solution and catherine FOLLOW UP  Follow Up Needed: Yes  SLP Follow-up Date: 11/18/18  Number of Visits to Meet Established Goals: 5    Session: 1    If you have any questions, please contact Ambrocio Suarez.  Toshia Rosa M.S.,CCC-SLP  Speech Language Geryl Ball

## 2018-11-17 NOTE — PLAN OF CARE
Assumed patient care at 1900  Patient is Alert to self at this point in time  Family did call RN into the room when patient was noted to be more agitated and confused. Dr. Salbador Kim was notified of event; no new orders received. Staff will monitor.   Tika Patient/Family Long Term Goal Progressing    • Patient/Family Short Term Goal Progressing

## 2018-11-17 NOTE — PROGRESS NOTES
[address] [facility] Progress Note    Jodie Brody Patient Status:  Inpatient    5/10/1940 MRN [id number]   [facility]A [facility] Rebecca Patel MD         Neurology Attending note                        CC: Right sided weakness/Difficulty speaking    Subjective:  Lasha Baum is a(n) 69year old female, with a PMH of DM, HTN, and prior CT, who was transferred from [place] ED after neighbors found patient with right sided weaknes cannot fully grasp. Can lift right arm up fully to touch back of head. Able to cross legs without difficulty. Cannot follow commands to complete finger to nose testing.       Labs:  Lab Results   Component Value Date    TROP 0.177 11/16/2018    PGLU 142 1

## 2018-11-17 NOTE — PLAN OF CARE
Patient had episode that she became anxious and tearful. Family stated that it started during praying. There was about 10 family members in room. Family was asked to leave, emotional support given and patient calmed down.

## 2018-11-17 NOTE — PROGRESS NOTES
[facility]  Progress Note     Elian Eng Patient Status:  Inpatient    5/10/1940 MRN [id number]   [facility] 3NE-A Attending [facility] Day # 3 PCP Rebecca Patel MD     Chief Complaint: Rt sided weakness 100 mg Oral BID    Or   • docusate sodium  100 mg Oral BID   • Fluticasone Propionate  2 spray Each Nare Daily   • Losartan Potassium  50 mg Oral BID   • PARoxetine HCl  10 mg Oral QAM   • Heparin Sodium (Porcine)  5,000 Units Subcutaneous Q8H [facility]   • ator

## 2018-11-17 NOTE — PLAN OF CARE
Problem: Impaired Swallowing  Goal: Minimize aspiration risk  Interventions:  NPO. Oral care.   Meds via non-oral route  Dysphagia tx  Outcome: Progressing

## 2018-11-17 NOTE — PLAN OF CARE
Diabetes/Glucose Control    • Glucose maintained within prescribed range Progressing        Impaired Activities of Daily Living    • Achieve highest/safest level of independence in self care Progressing        Impaired Communication    • Patient will achie

## 2018-11-17 NOTE — PROGRESS NOTES
[facility] [address] [facility] Cardiology Progress Note        Elder Paget Patient Status:  Inpatient    5/10/1940 MRN [id number]   [facility] 3NE-A Attending [facility] Day # 3 PCP Rebecca Patel MD     Subjective: 11/14/18   1036  11/15/18   0600   NA  137   --    K  3.3  3.9   CL  101   --    CO2  24   --    BUN  11   --    CREATSERUM  0.97   --    CA  8.9   --    GLU  183*   --        No results for input(s): ALT, AST, ALB, AMYLASE, LIPASE, LDH in the last 168 cooper

## 2018-11-18 ENCOUNTER — APPOINTMENT (OUTPATIENT)
Dept: GENERAL RADIOLOGY | Facility: HOSPITAL | Age: 78
DRG: 041 | End: 2018-11-18
Attending: HOSPITALIST
Payer: MEDICARE

## 2018-11-18 PROCEDURE — 99233 SBSQ HOSP IP/OBS HIGH 50: CPT | Performed by: OTHER

## 2018-11-18 PROCEDURE — 71045 X-RAY EXAM CHEST 1 VIEW: CPT | Performed by: HOSPITALIST

## 2018-11-18 PROCEDURE — 99232 SBSQ HOSP IP/OBS MODERATE 35: CPT | Performed by: HOSPITALIST

## 2018-11-18 NOTE — PHYSICAL THERAPY NOTE
PHYSICAL THERAPY TREATMENT NOTE - INPATIENT    Room Number: 5871/0666-N     Session:    Number of Visits to Meet Established Goals: 5    Presenting Problem: CVA    Problem List  Principal Problem:    Cerebrovascular accident (CVA), unspecified mechanism ( O2 WALK                    AM-PAC '6-Clicks' INPATIENT SHORT FORM - BASIC MOBILITY  How much difficulty does the patient currently have. ..  -   Turning over in bed (including adjusting bedclothes, sheets and blankets)?: A Little   -   Sitting down o well, pt was not impulsive at all during the session during transfers or gait. Pt still exhibits R side neglect past midline & pt was encouraged to turn head during gait. Pt needs more verbal & visual cues on the R side.   Pt will continue to benefit from

## 2018-11-18 NOTE — PROGRESS NOTES
[address] [facility] Neu[facility] Progress Note    Seth Xavier Patient Status:  Inpatient    5/10/1940 MRN [id number]   [facility] 3NE-A Attending [facility] Day # 4 PCP Rebecca Patel MD       Neurology Attending note a(n) 69year old female, with a PMH of DM, HTN, and prior CT, who was transferred from [facility] ED after neighbors found patient with right sided weakness and inability to speak.   In talking with family, they had not spoken to patient in 2-3 days and she l analysis.     11/15/2018 MRI Brain  CONCLUSION:    1.  There are scattered punctate and patchy foci of restricted diffusion in the left frontal lobe, left parietal lobe, and left occipital lobe compatible with areas of acute ischemia/infarction.    2. Mild

## 2018-11-18 NOTE — PROGRESS NOTES
[address]in [address] [place] [address] Cardiology Progress Note        Elian Eng Patient Status:  Inpatient    5/10/1940 MRN [id number]   [facility] 3NE-A Attending [facility] Day # 4 PCP Rebecca Patel MD     Subjective: 11/14/18   1036  11/15/18   0600   NA  137   --    K  3.3  3.9   CL  101   --    CO2  24   --    BUN  11   --    CREATSERUM  0.97   --    CA  8.9   --    GLU  183*   --        No results for input(s): ALT, AST, ALB, AMYLASE, LIPASE, LDH in the last 168 cooper

## 2018-11-18 NOTE — PROGRESS NOTES
11/18/18 1508   Clinical Encounter Type   Visited With Patient and family together   Routine Visit Follow-up   Continue Visiting No   Orthodoxy Encounters   Orthodoxy Needs Prayer   Patient Spiritual Encounters   Spiritual Interventions  provide

## 2018-11-18 NOTE — PLAN OF CARE
Pt is A/O x 2-3. Family at bedside, pt is Armenian speaking. She is cooperative. Neuro Q 4. NIH daily. Expressive aphasia but her family states she is asking what day it is, and trying to speak more often.   HOB 30, Aspiration precautions- speech to re-eval

## 2018-11-18 NOTE — PROGRESS NOTES
[facility]  Progress Note     Lasha Baum Patient Status:  Inpatient    5/10/1940 MRN [id number]   [facility] 3NE-A Attending [facility] Day # 4 PCP Rebecca Patel MD     Chief Complaint: Rt sided weakness Imaging data reviewed in Epic.     Medications:   • metoprolol Tartrate  50 mg Oral 2x Daily(Beta Blocker)   • docusate sodium  100 mg Oral BID    Or   • docusate sodium  100 mg Oral BID   • Fluticasone Propionate  2 spray Each Nare Daily   • Losartan [place]

## 2018-11-18 NOTE — SLP NOTE
Attempted to see pt this am for speech therapy session. Pt accidentally pulled NG tube out and curently occupied with RN. SLP will re-attempt later today as schedule permits. Katelyn Howard M.S.,CCC-SLP  Speech Language Pathologist

## 2018-11-18 NOTE — PROGRESS NOTES
Pt Dobhoff came out slightly and placed back to 55 cm  Chest XRAY ordered to verify placement of tubing  Radiologist called to tell RN that tube was about in the same spot, and should be advanced about 5 cm to be safe.   Tube advanced to 60 cm just outside

## 2018-11-19 ENCOUNTER — TELEPHONE (OUTPATIENT)
Dept: FAMILY MEDICINE CLINIC | Facility: CLINIC | Age: 78
End: 2018-11-19

## 2018-11-19 ENCOUNTER — APPOINTMENT (OUTPATIENT)
Dept: GENERAL RADIOLOGY | Facility: HOSPITAL | Age: 78
DRG: 041 | End: 2018-11-19
Attending: HOSPITALIST
Payer: MEDICARE

## 2018-11-19 PROCEDURE — 74230 X-RAY XM SWLNG FUNCJ C+: CPT | Performed by: HOSPITALIST

## 2018-11-19 PROCEDURE — 71045 X-RAY EXAM CHEST 1 VIEW: CPT | Performed by: HOSPITALIST

## 2018-11-19 PROCEDURE — 99233 SBSQ HOSP IP/OBS HIGH 50: CPT | Performed by: HOSPITALIST

## 2018-11-19 RX ORDER — CEFAZOLIN SODIUM/WATER 2 G/20 ML
2 SYRINGE (ML) INTRAVENOUS ONCE
Status: COMPLETED | OUTPATIENT
Start: 2018-11-20 | End: 2018-11-20

## 2018-11-19 RX ORDER — AMLODIPINE BESYLATE 2.5 MG/1
2.5 TABLET ORAL DAILY
Status: DISCONTINUED | OUTPATIENT
Start: 2018-11-19 | End: 2018-11-20

## 2018-11-19 RX ORDER — DEXTROSE MONOHYDRATE 25 G/50ML
50 INJECTION, SOLUTION INTRAVENOUS
Status: DISCONTINUED | OUTPATIENT
Start: 2018-11-19 | End: 2018-11-21

## 2018-11-19 NOTE — PLAN OF CARE
Pt alert and oriented x3, some confusion noted at times. Denies pain, numbness, tingling. No c/o double, blurry vision. VSS. Afebrile. Neuro checks q4 hours. Daily NIH. Dobhoff to [facility] coffey, noted to pulled by pt.  Tube advanced, placement verified by CX

## 2018-11-19 NOTE — DIETARY NOTE
NUTRITION follow-up ASSESSMENT    Pt is at moderate nutrition risk. Pt does not meet malnutrition criteria.     NUTRITION DIAGNOSIS/PROBLEM:    Inadequate oral intake related to decreased ability to consume sufficient energy as evidenced by dysphagia, NGT f Needs: No  Food Allergies: No  Cultural/Ethnic/Druze Preferences Addresses: Yes    NUTRITION RELATED PHYSICAL FINDINGS:     1. Body Fat/Muscle Mass: BMI 27     2.  Fluid Accumulation: none noted    NUTRITION PRESCRIPTION:  Calories: 5253-5547 calories/d

## 2018-11-19 NOTE — PROGRESS NOTES
[facility]  Progress Note     Jasonolga Broderick Patient Status:  Inpatient    5/10/1940 MRN [id number]   [facility] 3NE-A Attending Tr[facility] Day # 5 PCP Rebecca Patel MD     Chief Complaint: Acute CVA    S: Patie Rectal Daily    Or   • aspirin  325 mg Oral Daily   • Senna  17.2 mg Oral Nightly   • famoTIDine  20 mg Oral Daily    Or   • famoTIDine  20 mg Intravenous Daily       ASSESSMENT / PLAN:     1. Acute embolic CVA  1. Aspirin/Lipitor  2. PT/OT  3.  Neurology f

## 2018-11-19 NOTE — PHYSICAL THERAPY NOTE
PHYSICAL THERAPY TREATMENT NOTE - INPATIENT    Room Number: 9649/2353-X     Session: 3  Number of Visits to Meet Established Goals: 5    Presenting Problem: CVA     History related to current admission:      Pt is 69year old female admitted on 11/14/2018 CAPSULOTOMY - OD - RIGHT EYE Right 01/17/2018    LILA   • YAG CAPSULOTOMY - OS - LEFT EYE Left 01/24/2018    LILA       SUBJECTIVE  Dtr present to interpret, pt report right forearm/wrist are hurting. Patient’s self-stated goal is not stated.     OBJECTIVE present and interprets for pt. Pt completes supine to sit with supervision. Sits EOB with supervision. Sit to stand supervision. Gait x 250 ft with CGA, occasional min assist, no AD.  Pt will attend to right side when cues and look to right side when spoken assistance level: minimum assistance-met. Upgrade to supervision      Goal #4  Pt will follow 50% of simple commands with reinforcement-met.   Upgrade to 100% multi-step verbal commands   Goal #5     Goal #6     Goal Comments: Goals established on 11/15/20

## 2018-11-19 NOTE — TELEPHONE ENCOUNTER
Lizy Riojas is calling to inform Dr. Buffy Sanchez pt was admitted into the hospital. She is not being admitted to [facility].

## 2018-11-19 NOTE — TELEPHONE ENCOUNTER
Renetta Garcia  Her sister had questioned whether patient could be transferred to [address] [place] [address] but states patient has congestion and is now being taken down for chest xray  Drooling, not swallowing well   Will be getting Jpeg tube,  Informed has to be stable for canas

## 2018-11-19 NOTE — VIDEO SWALLOW STUDY NOTE
REPEAT--ADULT VIDEOFLUOROSCOPIC SWALLOWING STUDY    Admission Date: 11/14/2018  Evaluation Date: 11/19/18  Radiologist: Dr. Kannan Whiting: NPO  Diet Recommendations - Liquid: NPO   GI consult for PEG.   Continued sp in conjunction with Radiologist.  Patient Positioned: Upright;MAITE chair. Patient Viewed: Lateral.  Patient Alertness: Fully alert(nonverbal.  Expressive aphasia. ? apraxia). Consistencies Presented: Nectar thick liquids; Honey thick liquids;Puree to asses Communication therapy    EDUCATION/INSTRUCTION  Reviewed results and recommendations with patient/family/caregiver. Agreement/Understanding verbalized and all questions answered to their apparent satisfaction.     INTERDISCIPLINARY COMMUNICATION  Reviewed

## 2018-11-19 NOTE — PLAN OF CARE
Assumed patient care at 1900  Patient A&O x 3  No complaints of pain or discomfort this evening  Neuro checks Q 4 hours  [facility] daily  Neurology has signed off on patient  VSS  Increased BP overnight but improved with PO and IV medication  Tele-NSR  O2 sats i

## 2018-11-19 NOTE — TELEPHONE ENCOUNTER
Aby Cash is calling to request to speal to . She states she had a couple questions that she would rather discuss with Dr. Brandee Marte. pls advise.  Thank you    Ph : [phone]

## 2018-11-19 NOTE — PROGRESS NOTES
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

## 2018-11-20 ENCOUNTER — ANESTHESIA EVENT (OUTPATIENT)
Dept: ENDOSCOPY | Facility: HOSPITAL | Age: 78
DRG: 041 | End: 2018-11-20
Payer: MEDICARE

## 2018-11-20 ENCOUNTER — ANESTHESIA (OUTPATIENT)
Dept: ENDOSCOPY | Facility: HOSPITAL | Age: 78
DRG: 041 | End: 2018-11-20
Payer: MEDICARE

## 2018-11-20 PROCEDURE — 3E0G76Z INTRODUCTION OF NUTRITIONAL SUBSTANCE INTO UPPER GI, VIA NATURAL OR ARTIFICIAL OPENING: ICD-10-PCS | Performed by: INTERNAL MEDICINE

## 2018-11-20 PROCEDURE — 0DH63UZ INSERTION OF FEEDING DEVICE INTO STOMACH, PERCUTANEOUS APPROACH: ICD-10-PCS | Performed by: INTERNAL MEDICINE

## 2018-11-20 PROCEDURE — 99233 SBSQ HOSP IP/OBS HIGH 50: CPT | Performed by: HOSPITALIST

## 2018-11-20 DEVICE — SAFETY PEG KIT 20FR: Type: IMPLANTABLE DEVICE | Site: ABDOMEN | Status: FUNCTIONAL

## 2018-11-20 RX ORDER — AMLODIPINE BESYLATE 5 MG/1
5 TABLET ORAL NIGHTLY
Status: DISCONTINUED | OUTPATIENT
Start: 2018-11-20 | End: 2018-11-21

## 2018-11-20 RX ORDER — NALOXONE HYDROCHLORIDE 0.4 MG/ML
80 INJECTION, SOLUTION INTRAMUSCULAR; INTRAVENOUS; SUBCUTANEOUS AS NEEDED
Status: DISCONTINUED | OUTPATIENT
Start: 2018-11-20 | End: 2018-11-20 | Stop reason: HOSPADM

## 2018-11-20 RX ORDER — DEXTROSE MONOHYDRATE 25 G/50ML
50 INJECTION, SOLUTION INTRAVENOUS
Status: DISCONTINUED | OUTPATIENT
Start: 2018-11-20 | End: 2018-11-20 | Stop reason: HOSPADM

## 2018-11-20 RX ORDER — SODIUM CHLORIDE 9 MG/ML
INJECTION, SOLUTION INTRAVENOUS CONTINUOUS
Status: DISCONTINUED | OUTPATIENT
Start: 2018-11-20 | End: 2018-11-21

## 2018-11-20 RX ORDER — SODIUM CHLORIDE, SODIUM LACTATE, POTASSIUM CHLORIDE, CALCIUM CHLORIDE 600; 310; 30; 20 MG/100ML; MG/100ML; MG/100ML; MG/100ML
INJECTION, SOLUTION INTRAVENOUS CONTINUOUS
Status: DISCONTINUED | OUTPATIENT
Start: 2018-11-20 | End: 2018-11-21

## 2018-11-20 NOTE — ANESTHESIA POSTPROCEDURE EVALUATION
[facility] Patient Status:  Inpatient   Age/Gender 69year old female MRN [id number]   Location [address] [place] [address]. Attending Vi Dixon MD   [facility] Day # 6 PCP Rebecca Patel MD       Anesthesia Post-op Note    Procedure(s):

## 2018-11-20 NOTE — CONSULTS
[facility]  Report of Consultation    Zeb Valencia Patient Status:  Inpatient    5/10/1940 MRN [id number]   [facility] 3NE-A Attending Job Dial, MD   [facility] Day # 5 PCP Rebecca Patel MD     Reason for Consultation:  Dysphagia    H Medications:   •  glucose (DEX4) oral liquid 15 g, 15 g, Oral, Q15 Min PRN **OR** Glucose-Vitamin C (DEX-4) 4-6 GM-MG chewable tab 4 tablet, 4 tablet, Oral, Q15 Min PRN **OR** dextrose 50 % injection 50 mL, 50 mL, Intravenous, Q15 Min PRN **OR** glucose (D packet 17 g, 17 g, Oral, Daily PRN  •  magnesium hydroxide (MILK OF MAGNESIA) 400 MG/5ML suspension 30 mL, 30 mL, Oral, Daily PRN  •  bisacodyl (DULCOLAX) rectal suppository 10 mg, 10 mg, Rectal, Daily PRN  •  FLEET ENEMA (FLEET) 7-19 GM/118ML enema 133 mL frontal lobe, left parietal lobe, and left occipital lobe compatible with areas of acute ischemia/infarction.       Assessment/Plan:  Patient Active Problem List:     Hypercholesteremia     Presbyopia     Essential hypertension with goal blood pressure less

## 2018-11-20 NOTE — PLAN OF CARE
AOx3  Upper sorbian speaking  VSS, on RA  R facial droop and R sided weakness  Tele- NSR  Electrolyte protocol   Hof inserted at 60cm  Glucerna 1.5 @ 45ml/hr with a 200ml flush Q6  EGD and PEG tube placement tomorrow, consent signed and on chart  Adult preop

## 2018-11-20 NOTE — CM/SW NOTE
Per unit RN, anticipate d/c to [facility] tomorrow. BLESSING s/w Suhas An with [facility] who reports that they have accepted patient and will have a bed tomorrow. BLESSING/CM will follow and assist with d/c.

## 2018-11-20 NOTE — PHYSICAL THERAPY NOTE
PHYSICAL THERAPY TREATMENT NOTE - INPATIENT    Room Number: 7128/3567-Z     Session: 4  Number of Visits to Meet Established Goals: 5    Presenting Problem: CVA     History related to current admission:      Pt is 69year old female admitted on 11/14/2018 YAG CAPSULOTOMY - OD - RIGHT EYE Right 01/17/2018    LILA   • YAG CAPSULOTOMY - OS - LEFT EYE Left 01/24/2018    LILA       SUBJECTIVE  Pt's son present - \"my hand hurts\" - per Pt's son, had early morning lab draw in R hand    Patient’s self-stated goal is Provided: Pt required increased assist for gait training this session - min A for retropulsion, and RW management. Pt's son present for session - reported Pt did not sleep well overnight. Pt amb in hallway as listed above.   Pt participated in supine ther minimum assistance  Met 11/16/18; new: supervision      Goal #3 Patient is able to ambulate 150 feet with assist device: least restrictive device at assistance level: minimum assistance-met.   Upgrade to supervision      Goal #4  Pt will follow 50% of simpl

## 2018-11-20 NOTE — OPERATIVE REPORT
Layton Members Patient Status:  Inpatient    5/10/1940 MRN [id number]   [facility] ENDOSCOPY Attending Gogo Crowell MD   [facility] Day # 6 PCP Rebecca Patel MD     PREOPERATIVE DIAGNOSIS/INDICATION: Dysphagia, post CVA  Ashlie Stout removable with a soft internal bolster was introduced with the Pull method, placement was confirmed endoscopically, a small incision was perform on the skin with a scalp to allow passage of the 20 Fr tube, at the end of the procedure the internal bolster w

## 2018-11-20 NOTE — ANESTHESIA PREPROCEDURE EVALUATION
PRE-OP EVALUATION    Patient Name: Trudy Wong    Pre-op Diagnosis: CVA (cerebral vascular accident) ([facility] [address] [I63.9]    Procedure(s):  ESOPHAGOGASTRODUODENOSCOPY (EGD) WITH PEG    Surgeon(s) and Role:     Cali Gerard MD - Primary    Pre-op vitals [] 0.9%  NaCl infusion  Intravenous Continuous   acetaminophen (TYLENOL) tab 650 mg 650 mg Oral Q4H PRN   Or      acetaminophen (TYLENOL) 650 MG rectal suppository 650 mg 650 mg Rectal Q4H PRN   morphINE sulfate (PF) 4 MG/ML injection 1 mg 1 mg Intr Losartan Potassium 50 MG Oral Tab Take 1 tablet (50 mg total) by mouth 2 (two) times daily.  Disp: 180 tablet Rfl: 3       Allergies: Lisinopril      Anesthesia Evaluation        Anesthetic Complications           GI/Hepatic/Renal Alcohol use: No      Drug use: No     Available pre-op labs reviewed. Lab Results   Component Value Date    WBC 5.8 11/19/2018    RBC 4.62 11/19/2018    HGB 13.6 11/19/2018    HCT 40.3 11/19/2018    MCV 87.2 11/19/2018    MCH 29.4 11/19/2018    MCHC 33.

## 2018-11-20 NOTE — PROGRESS NOTES
[facility]  Progress Note     Ken Cutler Patient Status:  Inpatient    5/10/1940 MRN [id number]   [facility] 3NE-A Attending [facility] Day # 6 PCP Rebecca Patel MD     Chief Complaint: Acute CVA    S: Patie docusate sodium  100 mg Oral BID    Or   • docusate sodium  100 mg Oral BID   • Fluticasone Propionate  2 spray Each Nare Daily   • Losartan Potassium  50 mg Oral BID   • PARoxetine HCl  10 mg Oral QAM   • Heparin Sodium (Porcine)  5,000 Units Subcutaneous

## 2018-11-20 NOTE — CONSULTS
[facility] Electrophysiology Consult      Mya Melaraadin Patient Status:  Inpatient    5/10/1940 MRN [id number]   [facility] 3NE-A Attending Yin Hernandez MD   [facility] Day # 5 PCP Rebecca Abdul MD       Mya Gaitan is a 69 year ol once a week. Disp: 12 tablet Rfl: 3   acetaminophen 500 MG Oral Tab Take 500 mg by mouth as needed for Pain. Disp:  Rfl:    Cholecalciferol (VITAMIN D3) 3000 units Oral Tab Take 3,000 Units by mouth daily.    Disp:  Rfl:    aspirin 81 MG Oral Tab Take 1 t no carotid bruits, no thyromegaly  RESPIRATORY: clear to auscultation  CARDIOVASCULAR: S1, S2 normal, RRR; no S3, no S4; no click; no murmur  ABDOMEN: normal active BS, soft, nondistended; nontender  EXTREMITIES: no cyanosis, clubbing or edema, peripheral

## 2018-11-20 NOTE — DIETARY NOTE
Nutrition Short Note    Consult received for tf recs for discharge. Pt has been tolerating current formula & rate well. Anticipate d/c to Acute Rehab for continued PT/OT in setting of acute CVA.  Would Recommend Cycling TF so pt may be free for therapy ses

## 2018-11-21 ENCOUNTER — APPOINTMENT (OUTPATIENT)
Dept: INTERVENTIONAL RADIOLOGY/VASCULAR | Facility: HOSPITAL | Age: 78
DRG: 041 | End: 2018-11-21
Attending: INTERNAL MEDICINE
Payer: MEDICARE

## 2018-11-21 VITALS
TEMPERATURE: 98 F | WEIGHT: 128.63 LBS | DIASTOLIC BLOOD PRESSURE: 42 MMHG | HEIGHT: 58 IN | BODY MASS INDEX: 27 KG/M2 | SYSTOLIC BLOOD PRESSURE: 137 MMHG | OXYGEN SATURATION: 96 % | HEART RATE: 72 BPM | RESPIRATION RATE: 18 BRPM

## 2018-11-21 PROCEDURE — 0JH632Z INSERTION OF MONITORING DEVICE INTO CHEST SUBCUTANEOUS TISSUE AND FASCIA, PERCUTANEOUS APPROACH: ICD-10-PCS | Performed by: INTERNAL MEDICINE

## 2018-11-21 PROCEDURE — 99239 HOSP IP/OBS DSCHRG MGMT >30: CPT | Performed by: HOSPITALIST

## 2018-11-21 RX ORDER — METOPROLOL TARTRATE 50 MG/1
50 TABLET, FILM COATED ORAL
Refills: 0 | Status: SHIPPED | COMMUNITY
Start: 2018-11-21 | End: 2019-01-02

## 2018-11-21 RX ORDER — ARIPIPRAZOLE 15 MG/1
40 TABLET ORAL ONCE
Status: COMPLETED | OUTPATIENT
Start: 2018-11-21 | End: 2018-11-21

## 2018-11-21 RX ORDER — ATORVASTATIN CALCIUM 80 MG/1
80 TABLET, FILM COATED ORAL NIGHTLY
Refills: 0 | Status: SHIPPED | COMMUNITY
Start: 2018-11-21 | End: 2019-01-02

## 2018-11-21 RX ORDER — ASPIRIN 325 MG
325 TABLET ORAL DAILY
Status: DISCONTINUED | OUTPATIENT
Start: 2018-11-22 | End: 2018-11-21

## 2018-11-21 RX ORDER — ATORVASTATIN CALCIUM 80 MG/1
80 TABLET, FILM COATED ORAL NIGHTLY
Status: DISCONTINUED | OUTPATIENT
Start: 2018-11-21 | End: 2018-11-21

## 2018-11-21 RX ORDER — LOSARTAN POTASSIUM 50 MG/1
50 TABLET ORAL 2 TIMES DAILY
Status: DISCONTINUED | OUTPATIENT
Start: 2018-11-21 | End: 2018-11-21

## 2018-11-21 RX ORDER — ASPIRIN 325 MG
325 TABLET ORAL DAILY
Refills: 0 | Status: SHIPPED | COMMUNITY
Start: 2018-11-22 | End: 2019-01-02

## 2018-11-21 RX ORDER — AMLODIPINE BESYLATE 5 MG/1
5 TABLET ORAL NIGHTLY
Status: DISCONTINUED | OUTPATIENT
Start: 2018-11-21 | End: 2018-11-21

## 2018-11-21 RX ORDER — AMLODIPINE BESYLATE 5 MG/1
5 TABLET ORAL NIGHTLY
Refills: 0 | Status: SHIPPED | COMMUNITY
Start: 2018-11-21 | End: 2018-12-27

## 2018-11-21 NOTE — CM/SW NOTE
Assigned room# at [facility] is 1183, ph#[phone] for nurse report. SW arranged ambulance transport for 6:45pm. RN informed.

## 2018-11-21 NOTE — PHYSICAL THERAPY NOTE
PHYSICAL THERAPY TREATMENT NOTE - INPATIENT    Room Number: 6295/9826-U     Session: 5  Number of Visits to Meet Established Goals: 10    Presenting Problem: CVA     History related to current admission:      Pt is 69year old female admitted on 11/14/201 EYE Right 01/17/2018    LILA   • YAG CAPSULOTOMY - OS - LEFT EYE Left 01/24/2018    LILA       SUBJECTIVE  Pt minimally verbal throughout session. Patient’s self-stated goal is not stated.     OBJECTIVE  Precautions: Bed/chair alarm(R side inattention) son-in-law present at bedside. Pt required Min A for supine>sit at EOB for BLE and trunk flexion due to pain related to PEG tube. Pt completed sit<>stand to RW with SBA.  Pt ambulated 300 feet with use of a RW with Min A for RW management in order to stay Goals established on 11/15/2018; all goals ongoing as of 11/21/2018

## 2018-11-21 NOTE — CM/SW NOTE
Per Ashtyn Marx with [facility], they will have a bed for patient later today. SW/CM will follow.        Assigned room# at [facility] is 1183, ph#[phone] for nurse report

## 2018-11-21 NOTE — PROCEDURES
Loop Recorder Implant      History:  69year old female with a cryptogenic stroke who presents for a loop recorder implant. The risks and benefits of the procedure (including, but not limited to, hematoma and infection) were discussed.  The patient Pam

## 2018-11-21 NOTE — SLP NOTE
Attempted to see pt for speech/swallow therapy. Pt sleeping at time of attempt. Pt accepted to [facility] and will likely d/c later today. Chelsea Brake Consuela Claude, M.S.,CCC-SLP  Speech Language Pathologist

## 2018-11-21 NOTE — PROGRESS NOTES
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

## 2018-11-21 NOTE — OCCUPATIONAL THERAPY NOTE
OCCUPATIONAL THERAPY TREATMENT NOTE - INPATIENT     Room Number: 1763/2576-N  Session: 1  Number of Visits to Meet Established Goals: 5    Presenting Problem: L frontal/parietal/occipital infarct    History related to current admission: Pt was admitted fro 01/17/2018    LILA   • YAG CAPSULOTOMY - OS - LEFT EYE Left 01/24/2018    LILA       SUBJECTIVE  Pt aphasic, is mumbling some responses to questions    Patient self-stated goal is unknown    OBJECTIVE  Precautions: Bed/chair alarm(R side inattention)    Vesna De Guzman extension, incomplete composite grasp. Sit<> Stand X 8 at S level. Functional ambulation for ~ 175 ft w/RW at [facility], pt veering R and L sides with physical assist, V/V/T cues for correcting/safety.   Pt able to scan to the R on command with increased time, and intensive therapy program.       **PHQ 9 - NOT APPROPRIATE AT THIS TIME. Pt still cannot follow 1-step instructions at all times    OT Discharge Recommendations: Acute rehabilitation       PLAN  OT Treatment Plan: Balance activities; Energy conservation

## 2018-11-21 NOTE — PROGRESS NOTES
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

## 2018-11-21 NOTE — PROGRESS NOTES
NURSING DISCHARGE NOTE    Discharged Rehab facility via Ambulance. Accompanied by Support staff  Belongings Taken by patient/family. Pt discharged to [facility]. Discharge paperwork given to EMS staff and family. IV removed.  Pt tolerating TF. 0 res

## 2018-11-21 NOTE — PROGRESS NOTES
[facility]  Progress Note     Carmela Ascencio Patient Status:  Inpatient    5/10/1940 MRN [id number]   [facility] 3NE-A Attending [facility] Day # 7 PCP Rebecca Patel MD     Chief Complaint: Acute CVA    S: Patie Fluticasone Propionate  2 spray Each Nare Daily   • PARoxetine HCl  10 mg Oral QAM   • Heparin Sodium (Porcine)  5,000 Units Subcutaneous Q8H [facility]   • Senna  17.2 mg Oral Nightly   • famoTIDine  20 mg Oral Daily    Or   • famoTIDine  20 mg Intravenous Daily

## 2018-11-21 NOTE — PROGRESS NOTES
Pt here for linq insertion. Patient non verbal. Son - in - law at bedside signed consent for procedure. Dr. Tia Reyes at bedside, procedure completed without complications. Reviewed d/c instructions with son-in law. Verbalized understanding.  Attempting to c

## 2018-11-21 NOTE — DISCHARGE SUMMARY
[facility]PITAL[facility]  DISCHARGE SUMMARY     Zeb Valencia Patient Status:  Inpatient    5/10/1940 MRN [id number]   [facility] 3NE-A Attending Job Dial, MD   [facility] Day # 7 PCP Rebecca Patel MD     Date of Admission: 2018  Date of Fran Levin Refills:  0     Insulin Aspart Pen 100 UNIT/ML Sopn  Commonly known as:  NOVOLOG      Inject 1-5 Units into the skin every 6 (six) hours.    Quantity:  3 mL  Refills:  0        CHANGE how you take these medications      Instructions Prescription details   a [address] [address]  [phone]      or Dr Micah KEBEDE, 2-3 weeks after discharge from [facility] [address] Inf for Next 30 Days 11/21/2018 - 12/21/2018    None          This note is linked to that written earlier------------------

## 2018-11-22 NOTE — PLAN OF CARE
Diabetes/Glucose Control    • Glucose maintained within prescribed range Progressing        Impaired Activities of Daily Living    • Achieve highest/safest level of independence in self care Progressing        Impaired Activities of Daily Living    • Achie

## 2018-11-27 NOTE — PAYOR COMM NOTE
--------------  DISCHARGE REVIEW    Payor: Logan Dexter  #:  [id number]  Authorization Number: [id number]    Admit date: 11/14/18  Admit time:  7157  Discharge Date: 11/21/2018  7:06 PM     Admitting Physician: DO Matt Nowak

## 2018-12-05 ENCOUNTER — TELEPHONE (OUTPATIENT)
Dept: OTHER | Age: 78
End: 2018-12-05

## 2018-12-05 DIAGNOSIS — E11.9 CONTROLLED TYPE 2 DIABETES MELLITUS WITHOUT COMPLICATION, WITHOUT LONG-TERM CURRENT USE OF INSULIN (HCC): Primary | ICD-10-CM

## 2018-12-05 DIAGNOSIS — I63.9 ACUTE EMBOLIC STROKE (HCC): ICD-10-CM

## 2018-12-05 RX ORDER — NUT.TX.GLUC INTOL,LF,SOY/FIBER 0.06 G-1.2
1 LIQUID (ML) ORAL 3 TIMES DAILY
Qty: 90 CONTAINER | Refills: 0 | Status: SHIPPED
Start: 2018-12-05 | End: 2019-02-07

## 2018-12-05 NOTE — TELEPHONE ENCOUNTER
Dr Patel=please see message below and advise.     Daughter calling and states that [facility] with [facility], with order for GT feeding 3x/day 1 can each feeding of Glucerna 1.2 Thierry, states that she called Elis Bowser and

## 2018-12-05 NOTE — TELEPHONE ENCOUNTER
Typically the glucerna can be given through the G tube (easier for patient that is still struggling with oral) to supplement caloric intake.   Insurance usually covers if MD provides orders to establish medical necessity for this as it is something that is

## 2018-12-05 NOTE — TELEPHONE ENCOUNTER
I am not familiar with the feedings/ she will need this from a dietician or contact ti where she was discharged and speak to  there

## 2018-12-06 ENCOUNTER — PATIENT MESSAGE (OUTPATIENT)
Dept: FAMILY MEDICINE CLINIC | Facility: CLINIC | Age: 78
End: 2018-12-06

## 2018-12-06 ENCOUNTER — OFFICE VISIT (OUTPATIENT)
Dept: FAMILY MEDICINE CLINIC | Facility: CLINIC | Age: 78
End: 2018-12-06
Payer: MEDICARE

## 2018-12-06 ENCOUNTER — TELEPHONE (OUTPATIENT)
Dept: FAMILY MEDICINE CLINIC | Facility: CLINIC | Age: 78
End: 2018-12-06

## 2018-12-06 VITALS
WEIGHT: 114 LBS | HEART RATE: 51 BPM | SYSTOLIC BLOOD PRESSURE: 146 MMHG | BODY MASS INDEX: 23.93 KG/M2 | DIASTOLIC BLOOD PRESSURE: 62 MMHG | HEIGHT: 58 IN | TEMPERATURE: 97 F

## 2018-12-06 DIAGNOSIS — Z91.81 RISK FOR FALLS: ICD-10-CM

## 2018-12-06 DIAGNOSIS — I10 ESSENTIAL HYPERTENSION: ICD-10-CM

## 2018-12-06 DIAGNOSIS — Z93.4 GASTROJEJUNOSTOMY TUBE STATUS (HCC): ICD-10-CM

## 2018-12-06 DIAGNOSIS — R47.9 DIFFICULTY WITH SPEECH: ICD-10-CM

## 2018-12-06 DIAGNOSIS — E78.00 HYPERCHOLESTEREMIA: ICD-10-CM

## 2018-12-06 DIAGNOSIS — R29.898 WEAKNESS OF RIGHT UPPER EXTREMITY: ICD-10-CM

## 2018-12-06 DIAGNOSIS — I63.9 CEREBROVASCULAR ACCIDENT (CVA), UNSPECIFIED MECHANISM (HCC): Primary | ICD-10-CM

## 2018-12-06 DIAGNOSIS — R13.10 DYSPHAGIA, UNSPECIFIED TYPE: ICD-10-CM

## 2018-12-06 PROCEDURE — G0463 HOSPITAL OUTPT CLINIC VISIT: HCPCS | Performed by: FAMILY MEDICINE

## 2018-12-06 PROCEDURE — 99214 OFFICE O/P EST MOD 30 MIN: CPT | Performed by: FAMILY MEDICINE

## 2018-12-06 RX ORDER — CALCIUM CITRATE/VITAMIN D3 200MG-6.25
TABLET ORAL
Refills: 0 | COMMUNITY
Start: 2018-12-04 | End: 2019-01-21

## 2018-12-06 RX ORDER — GLUCOSAM/CHON-MSM1/C/MANG/BOSW 500-416.6
TABLET ORAL
Refills: 0 | COMMUNITY
Start: 2018-12-04 | End: 2018-12-27

## 2018-12-06 RX ORDER — FAMOTIDINE 20 MG/1
TABLET ORAL
Refills: 0 | COMMUNITY
Start: 2018-12-04 | End: 2018-12-26

## 2018-12-06 NOTE — TELEPHONE ENCOUNTER
Pts daughter called stating per  pt need a 30 min visit. No documentation for approval for 30 mins visit. Please advise on what date and time we can make a 30 min visit for pt.        Please reply to pool: YUDI Aguila

## 2018-12-06 NOTE — TELEPHONE ENCOUNTER
From: Dm Broderick  To: Juvenal Bonilla MD  Sent: 12/6/2018 9:32 AM CST  Subject: Referral Request    Marlee Wilson,  Here is the list of the home health agencies I was given.     [facility] [address], [address] [place] [address] Page  [phone]

## 2018-12-06 NOTE — TELEPHONE ENCOUNTER
Oh ok  Sure  In that case no problem but if needing certain amount of protein etc I would not be able to determine that for the patient   I can do a script, correct?

## 2018-12-07 ENCOUNTER — TELEPHONE (OUTPATIENT)
Dept: FAMILY MEDICINE CLINIC | Facility: CLINIC | Age: 78
End: 2018-12-07

## 2018-12-07 NOTE — PROGRESS NOTES
HPI:    Patient ID: Layton Hardin is a 69year old female. HPI     Patient is here with her daughter after suffering an acute stroke and was admitted at [facility].  Patient was admitted on November 14, 2018 and discharged on November 21, 2018.   Kuldip acute embolic CVA. Patient admitted with neurology and cardiology on consult. She failed her speech evaluation and underwent PEG insertion. Lastly, in order to identify any arrhthymias, LINQ place.  Plan for acute rehab.     Lace+ Score: 75  59-90 High Risk Per G Tube route 2x Daily(Beta Blocker). Disp:  Rfl: 0   Multiple Vitamin (MULTI-DAY VITAMINS OR) Take 1 tablet by mouth daily. Disp:  Rfl:    PARoxetine HCl 10 MG Oral Tab Take 10 mg by mouth every morning.  Disp:  Rfl:    Fluticasone Propionate 50 MCG/ACT encounter diagnosis)  Essential hypertension  Hypercholesteremia  Risk for falls  Difficulty with speech  Dysphagia, unspecified type  Weakness of right upper extremity  Gastrojejunostomy tube status (hcc)    1.  Cerebrovascular accident (CVA), unspecified

## 2018-12-07 NOTE — TELEPHONE ENCOUNTER
Rey Hopson, Nurse from [facility] called she stated she need:    Demographic Sheet    Emergency Contact    Copy of Pt Ins.  Info    Progress notes in the last 90 days with signature

## 2018-12-08 NOTE — TELEPHONE ENCOUNTER
All documents printed.  Left message to [facility] Record to provide fax number,

## 2018-12-10 ENCOUNTER — PATIENT MESSAGE (OUTPATIENT)
Dept: FAMILY MEDICINE CLINIC | Facility: CLINIC | Age: 78
End: 2018-12-10

## 2018-12-10 DIAGNOSIS — I63.9 ACUTE EMBOLIC STROKE (HCC): ICD-10-CM

## 2018-12-10 DIAGNOSIS — R47.9 DIFFICULTY WITH SPEECH: ICD-10-CM

## 2018-12-10 DIAGNOSIS — Z86.73 HISTORY OF CVA (CEREBROVASCULAR ACCIDENT): Primary | ICD-10-CM

## 2018-12-10 DIAGNOSIS — R29.898 WEAKNESS OF RIGHT UPPER EXTREMITY: ICD-10-CM

## 2018-12-10 DIAGNOSIS — R13.10 DYSPHAGIA, UNSPECIFIED TYPE: ICD-10-CM

## 2018-12-10 DIAGNOSIS — Z93.4 GASTROJEJUNOSTOMY TUBE STATUS (HCC): ICD-10-CM

## 2018-12-10 NOTE — TELEPHONE ENCOUNTER
From: Migdalia Contreras  To: Ana Hudson MD  Sent: 12/10/2018 9:50 AM CST  Subject: Referral Request    [facility] ,  I just got a call from [facility] for mom and they said in order to get moms home health going you would have to call The clinical intake team

## 2018-12-11 ENCOUNTER — TELEPHONE (OUTPATIENT)
Dept: FAMILY MEDICINE CLINIC | Facility: CLINIC | Age: 78
End: 2018-12-11

## 2018-12-11 NOTE — TELEPHONE ENCOUNTER
Per Tyler Adkins she received an order for home health. Tyler Adkins would like to know what specifically the doctor is ordering. Tyler Adkins, states  the order only says home health, does the patient needs physical therapy or anything else?

## 2018-12-11 NOTE — TELEPHONE ENCOUNTER
SHE NEEDS SPEECH EVAL AND TREAT AND PATIENT HAD SOME SWALLOWING PROBLEM  SHE NEEDS PHYSICAL THERAPY AS WELL FOR RIGHT HAND WEAKNESS

## 2018-12-12 NOTE — TELEPHONE ENCOUNTER
Spoke with Yury Han PT from [facility]--reports she was reviewing patient's home medications not on medication list:    Docusate Sodium 50 mg (OTC) and Senna (Rx from Dr. Daisha Ivey thinks maybe the doctor at rehab ordered--patient takes as needed--

## 2018-12-13 NOTE — PROGRESS NOTES
She will need a nurse to have her G-tube checked. I have no experience with G-tube so will not know what she would need. Can we call to get home health and see if the nurse can be sent to patient's home to check on the G-tube.

## 2018-12-14 NOTE — TELEPHONE ENCOUNTER
Contacted patient's daughter, patient is getting services with [facility] for PT/OT/Speech, requesting if an order can be sent over to them to begin RN services. Original order was done 12/6/18.   Daughter also mentioned her mother had several swallow test

## 2018-12-14 NOTE — PROGRESS NOTES
I will order a swallow eval study but she needs to follow up with  neurologist for the recent stroke and also a gen surgeon for the G tube.

## 2018-12-22 ENCOUNTER — HOSPITAL ENCOUNTER (OUTPATIENT)
Dept: GENERAL RADIOLOGY | Facility: HOSPITAL | Age: 78
Discharge: HOME | End: 2018-12-22
Attending: FAMILY MEDICINE
Payer: MEDICARE

## 2018-12-22 DIAGNOSIS — R13.10 DYSPHAGIA, UNSPECIFIED TYPE: ICD-10-CM

## 2018-12-22 DIAGNOSIS — I63.9 ACUTE EMBOLIC STROKE (HCC): ICD-10-CM

## 2018-12-22 PROCEDURE — 74230 X-RAY XM SWLNG FUNCJ C+: CPT | Performed by: FAMILY MEDICINE

## 2018-12-22 PROCEDURE — 92611 MOTION FLUOROSCOPY/SWALLOW: CPT

## 2018-12-22 NOTE — PROGRESS NOTES
ADULT VIDEOFLUOROSCOPIC SWALLOWING STUDY    Admission Date: 12/22/2018  Evaluation Date: 12/22/18  Radiologist: Dr. Gilson Dominique: Mechanical soft ground  Diet Recommendations - Liquid: Thin(Thin via spoon oth function. THIN LIQUIDS  Method of Presentation: Teaspoon;Cup  Oral Phase of Swallow (VFSS - Thin Liquids): Impaired  Bolus Retrieval (VFSS - Thin Liquids): Intact  Bilabial Seal (VFSS - Thin Liquids): Impaired  Bolus Formation (VFSS - Thin Liquids):  Int enters the airway, remains above the vocal folds, and is ejected from the airway       Overall Impression:   Patient presents with a mild-moderate oral and pharyngeal phase impairment of the swallow with laryngeal penetration of thin liquids via cup withou Swallowing, CJ: 20-39% impaired, limited, or restricted    Thank you for your referral.   If you have any questions, please contact Monse Walker

## 2018-12-26 NOTE — PROGRESS NOTES
[facility] ([facility]3], [facility] - Gastroenterology                                                                                                  Clinic Progress Note    Patient pr pressure    • Hypercholesteremia    • Hypertension    • Syncope 2009    2D echo ventriular wall thickening   • Type II or unspecified type diabetes mellitus without mention of complication, not stated as uncontrolled    • Unspecified essential hypertension Oral Tab 1 tablet (80 mg total) by Per G Tube route nightly. Disp:  Rfl: 0   metoprolol Tartrate 50 MG Oral Tab 1 tablet (50 mg total) by Per G Tube route 2x Daily(Beta Blocker).  Disp:  Rfl: 0   Multiple Vitamin (MULTI-DAY VITAMINS OR) Take 1 tablet by dank her grand daughter    Patient appears to have undergone an adult videofluoroscopic swallowing study 12/22 with overall impression of mild to moderate oral and pharyngeal phase impairment with recommendations of mechanical soft ground and nectar thick liqui

## 2018-12-27 ENCOUNTER — OFFICE VISIT (OUTPATIENT)
Dept: GASTROENTEROLOGY | Facility: CLINIC | Age: 78
End: 2018-12-27
Payer: MEDICARE

## 2018-12-27 ENCOUNTER — TELEPHONE (OUTPATIENT)
Dept: GASTROENTEROLOGY | Facility: CLINIC | Age: 78
End: 2018-12-27

## 2018-12-27 VITALS
DIASTOLIC BLOOD PRESSURE: 56 MMHG | HEART RATE: 50 BPM | HEIGHT: 58 IN | SYSTOLIC BLOOD PRESSURE: 117 MMHG | BODY MASS INDEX: 24.01 KG/M2 | WEIGHT: 114.38 LBS

## 2018-12-27 DIAGNOSIS — Z93.4 GASTROJEJUNOSTOMY TUBE STATUS (HCC): Primary | ICD-10-CM

## 2018-12-27 PROCEDURE — 99214 OFFICE O/P EST MOD 30 MIN: CPT | Performed by: INTERNAL MEDICINE

## 2018-12-27 RX ORDER — FAMOTIDINE 20 MG/1
20 TABLET, FILM COATED ORAL 2 TIMES DAILY PRN
Qty: 60 TABLET | Refills: 1 | Status: SHIPPED | OUTPATIENT
Start: 2018-12-27 | End: 2019-01-02

## 2018-12-27 RX ORDER — AMLODIPINE BESYLATE 5 MG/1
5 TABLET ORAL NIGHTLY
Qty: 90 TABLET | Refills: 0 | Status: SHIPPED | OUTPATIENT
Start: 2018-12-27 | End: 2019-03-08

## 2018-12-27 RX ORDER — LOSARTAN POTASSIUM 50 MG/1
50 TABLET ORAL 2 TIMES DAILY
Qty: 180 TABLET | Refills: 3 | Status: SHIPPED | OUTPATIENT
Start: 2018-12-27 | End: 2019-03-08

## 2018-12-27 RX ORDER — SENNOSIDES 8.6 MG
TABLET ORAL
COMMUNITY
Start: 2018-12-04 | End: 2019-01-02

## 2018-12-27 RX ORDER — GLUCOSAM/CHON-MSM1/C/MANG/BOSW 500-416.6
TABLET ORAL
Qty: 100 EACH | Refills: 2 | Status: SHIPPED | OUTPATIENT
Start: 2018-12-27 | End: 2019-03-18

## 2018-12-27 NOTE — TELEPHONE ENCOUNTER
Dr. Geneva Bacon    Spoke to pt Elly frye, and reviewed that we do not manage PEG tube supplies and needs to f/u with the SWW//insurance from rehab facilitity to set up home health RN to obtain supplies etc.    I also checked the supply lida

## 2018-12-27 NOTE — TELEPHONE ENCOUNTER
•  AmLODIPine Besylate 5 MG Oral Tab, 1 tablet (5 mg total) by Per G Tube route nightly., Disp: , Rfl: 0  •  atorvastatin 80 MG Oral Tab, 1 tablet (80 mg total) by Per G Tube route nightly., Disp: , Rfl: 0  •  Alendronate Sodium 70 MG Oral Tab, Take 1

## 2018-12-27 NOTE — TELEPHONE ENCOUNTER
Patient at office today with grand daughter. Spoke with patient's daughter on the phone. Asking if we have any 60 cc syringes which I discussed after looking we don't at the [facility] office. May need to go to a medical supply store.  Can see if we ha

## 2018-12-27 NOTE — TELEPHONE ENCOUNTER
Hypertensive Medications  Protocol Criteria:  · Appointment scheduled in the past 6 months or in the next 3 months  · BMP or CMP in the past 12 months  · Creatinine result < 2  Recent Outpatient Visits            2 weeks ago History of CVA (cerebrovascular

## 2018-12-29 RX ORDER — ALENDRONATE SODIUM 70 MG/1
70 TABLET ORAL WEEKLY
Qty: 12 TABLET | Refills: 3 | Status: SHIPPED | OUTPATIENT
Start: 2018-12-29 | End: 2019-03-08

## 2018-12-31 ENCOUNTER — TELEPHONE (OUTPATIENT)
Dept: FAMILY MEDICINE CLINIC | Facility: CLINIC | Age: 78
End: 2018-12-31

## 2018-12-31 NOTE — TELEPHONE ENCOUNTER
[address] Therapist with [facility] Nurse for assessment of G-Tube there is blood around the site. They can have a nurse out today or tomorrow.       Please advise

## 2019-01-02 ENCOUNTER — TELEPHONE (OUTPATIENT)
Dept: GASTROENTEROLOGY | Facility: CLINIC | Age: 79
End: 2019-01-02

## 2019-01-02 ENCOUNTER — TELEPHONE (OUTPATIENT)
Dept: FAMILY MEDICINE CLINIC | Facility: CLINIC | Age: 79
End: 2019-01-02

## 2019-01-02 NOTE — TELEPHONE ENCOUNTER
Patients home health nurse she states she didn't see any mucus or blood. RN states skin was intact, no odor, RN checked for placement today. But states daughter states she saw blood and thick mucus.     Spoke to Nomi Louis pts daughter (okay per Pushpa Bethea in [address]

## 2019-01-02 NOTE — TELEPHONE ENCOUNTER
Luiz Varghese is sending a nurse out to patients home today. She states patient's daughter stated the G-tube looks infected. She is calling as a FYI since nursing was not on orders.

## 2019-01-02 NOTE — TELEPHONE ENCOUNTER
Mireya/[facility] called to let MG know that pts daughter has noticed an increase in mucous and blood around G Tube sight. Please call Nan chan/ additional orders.

## 2019-01-02 NOTE — PROGRESS NOTES
[facility], [facility] - Gastroenterology                                                                                                  Clinic Progress Note    Patient pr (cerebral vascular accident) [facility]) Jan 2015    minor   • Headache    • High blood pressure    • Hypercholesteremia    • Hypertension    • Syncope 2009    2D echo ventriular wall thickening   • Type II or unspecified type diabetes mellitus without mention o Strip TEST BID UTD Disp:  Rfl: 0   Nutritional Supplements (GLUCERNA 1.2 FREDRICK) Oral Liquid 1 Can by Gastric Tube route 3 (three) times daily.  Dx:  E11.9, I63.9 Disp: 90 Container Rfl: 0   Blood Glucose Monitoring Suppl (BLOOD GLUCOSE MONITOR SYSTEM) w/Devic bruises  Psych: soft speech, somewhat unclear    Labs/Imaging:     Patient's labs and imaging were reviewed and discussed with patient today.       .  ASSESSMENT/PLAN:   Gilford Harbour is a 69year old year-old woman with history of anxiety, high blood press

## 2019-01-02 NOTE — TELEPHONE ENCOUNTER
No further recommendations. Will see her tomorrow.     Thanks    Nichole Jacobo MD  [facility], [facility] - Gastroenterology  1/2/2019  5:29 PM

## 2019-01-03 ENCOUNTER — OFFICE VISIT (OUTPATIENT)
Dept: GASTROENTEROLOGY | Facility: CLINIC | Age: 79
End: 2019-01-03
Payer: MEDICARE

## 2019-01-03 VITALS
SYSTOLIC BLOOD PRESSURE: 140 MMHG | WEIGHT: 112.38 LBS | HEIGHT: 58 IN | DIASTOLIC BLOOD PRESSURE: 55 MMHG | BODY MASS INDEX: 23.59 KG/M2 | HEART RATE: 47 BPM | TEMPERATURE: 97 F

## 2019-01-03 DIAGNOSIS — Z93.1 GASTROSTOMY TUBE IN PLACE (HCC): Primary | ICD-10-CM

## 2019-01-03 PROCEDURE — 99214 OFFICE O/P EST MOD 30 MIN: CPT | Performed by: INTERNAL MEDICINE

## 2019-01-03 RX ORDER — PAROXETINE 10 MG/1
TABLET, FILM COATED ORAL
Qty: 30 TABLET | Refills: 0 | Status: SHIPPED | OUTPATIENT
Start: 2019-01-03 | End: 2019-01-30

## 2019-01-03 RX ORDER — FAMOTIDINE 20 MG/1
TABLET ORAL
Qty: 30 TABLET | Refills: 0 | Status: SHIPPED | OUTPATIENT
Start: 2019-01-03 | End: 2019-03-08

## 2019-01-03 RX ORDER — ATORVASTATIN CALCIUM 80 MG/1
TABLET, FILM COATED ORAL
Qty: 30 TABLET | Refills: 0 | OUTPATIENT
Start: 2019-01-03

## 2019-01-06 ENCOUNTER — PATIENT MESSAGE (OUTPATIENT)
Dept: FAMILY MEDICINE CLINIC | Facility: CLINIC | Age: 79
End: 2019-01-06

## 2019-01-07 NOTE — TELEPHONE ENCOUNTER
From: Sherif Wagner  To: Vu Westbrook MD  Sent: 1/6/2019 3:11 PM CST  Subject: Prescription Question    Dwayne Schirmer mom has some prescriptions that Ronny is waiting for approval the senna , aspirin,and the metropolol 50 ,Lipitor which she is out of

## 2019-01-10 ENCOUNTER — TELEPHONE (OUTPATIENT)
Dept: FAMILY MEDICINE CLINIC | Facility: CLINIC | Age: 79
End: 2019-01-10

## 2019-01-10 NOTE — TELEPHONE ENCOUNTER
Received supplemental orders from Lorelei Velasquez from 12-28-18 to 1-5-19 form signed and faxed back successful

## 2019-01-21 NOTE — TELEPHONE ENCOUNTER
•  aspirin 325 MG Oral Tab, 1 tablet (325 mg total) by Per G Tube route daily. , Disp: 30 tablet, Rfl: 3  •  Glucose Blood (TRUE METRIX BLOOD GLUCOSE TEST) In Vitro Strip, TEST BID UTD, Disp: , Rfl: 0

## 2019-01-22 RX ORDER — ASPIRIN 325 MG
325 TABLET ORAL DAILY
Qty: 30 TABLET | Refills: 2 | Status: SHIPPED | OUTPATIENT
Start: 2019-01-22 | End: 2019-03-08

## 2019-01-22 RX ORDER — CALCIUM CITRATE/VITAMIN D3 200MG-6.25
TABLET ORAL
Qty: 100 STRIP | Refills: 11 | Status: SHIPPED | OUTPATIENT
Start: 2019-01-22 | End: 2019-03-18

## 2019-01-22 NOTE — TELEPHONE ENCOUNTER
Refill passed per [facility] protocol.   Refill Protocol Appointment Criteria  · Appointment scheduled in the past 6 months or in the next 3 months  Recent Outpatient Visits            2 days ago History of CVA (cerebrovascular accident)    [address] [facility]

## 2019-01-23 ENCOUNTER — TELEPHONE (OUTPATIENT)
Dept: FAMILY MEDICINE CLINIC | Facility: CLINIC | Age: 79
End: 2019-01-23

## 2019-01-23 DIAGNOSIS — Z86.73 HISTORY OF CVA (CEREBROVASCULAR ACCIDENT): ICD-10-CM

## 2019-01-23 DIAGNOSIS — R13.10 DYSPHAGIA, UNSPECIFIED TYPE: Primary | ICD-10-CM

## 2019-01-23 NOTE — TELEPHONE ENCOUNTER
Edgardo Ochoa from [facility] called and she stated she need referral to refer Pt to out patient speech pathology    The diagnosis is: Dysphagia    She stated if you can send the order to the hospital.  Pt wants to go to [facility] out patient

## 2019-01-24 NOTE — TELEPHONE ENCOUNTER
Spoke with pts daughter Ely Garcia and told her Referral has been placed for speech therapy daughter stated if you could also add another referral to get   electrical stimulation

## 2019-01-24 NOTE — TELEPHONE ENCOUNTER
I think is a regular physical therapy referral daughter stated that her Mom felt good when she had it done

## 2019-01-24 NOTE — TELEPHONE ENCOUNTER
What is the diagnosis for it? And what area is it for?   I would have her check with the Physical Therapy and see if they can send me any paperwork

## 2019-01-24 NOTE — TELEPHONE ENCOUNTER
I contacted the PT department and a physical therapist will either call us back or fax information on Dr. Mayco Ny at [phone]. Per the  the electrical stimulation is a type of pain reliever.

## 2019-01-25 NOTE — TELEPHONE ENCOUNTER
There is no specific referral for electrical stimulation as I am aware.  I think its what the Physical Therapist do as part of treatment deemed appropriate

## 2019-01-29 NOTE — TELEPHONE ENCOUNTER
Dr. Anna Matson  We no longer accept the patient's insurance. I talked to the PT department again. Per the PT department:  Dr. Anna Matson would have to write an order for PT to evaluate and treat.    In the comment section she would have to write what the patient

## 2019-01-30 RX ORDER — PAROXETINE 10 MG/1
TABLET, FILM COATED ORAL
Qty: 90 TABLET | Refills: 0 | Status: SHIPPED | OUTPATIENT
Start: 2019-01-30 | End: 2019-03-08

## 2019-01-30 NOTE — TELEPHONE ENCOUNTER
Refill passed per [facility] protocol.     Refill Protocol Appointment Criteria  · Appointment scheduled in the past 6 months or in the next 3 months  Recent Outpatient Visits            1 week ago History of CVA (cerebrovascular accident)    [facility]

## 2019-02-07 ENCOUNTER — TELEPHONE (OUTPATIENT)
Dept: NEUROLOGY | Facility: CLINIC | Age: 79
End: 2019-02-07

## 2019-02-07 ENCOUNTER — OFFICE VISIT (OUTPATIENT)
Dept: NEUROLOGY | Facility: CLINIC | Age: 79
End: 2019-02-07
Payer: COMMERCIAL

## 2019-02-07 VITALS
HEART RATE: 60 BPM | HEIGHT: 58 IN | SYSTOLIC BLOOD PRESSURE: 130 MMHG | RESPIRATION RATE: 16 BRPM | DIASTOLIC BLOOD PRESSURE: 78 MMHG | BODY MASS INDEX: 23.51 KG/M2 | WEIGHT: 112 LBS

## 2019-02-07 DIAGNOSIS — I63.9 CEREBROVASCULAR ACCIDENT (CVA), UNSPECIFIED MECHANISM (HCC): Primary | ICD-10-CM

## 2019-02-07 DIAGNOSIS — F03.90 DEMENTIA WITHOUT BEHAVIORAL DISTURBANCE, UNSPECIFIED DEMENTIA TYPE (HCC): ICD-10-CM

## 2019-02-07 PROCEDURE — 99204 OFFICE O/P NEW MOD 45 MIN: CPT | Performed by: OTHER

## 2019-02-07 NOTE — PROGRESS NOTES
Neurology Initial Visit     Referred By:  ref. provider found    Chief Complaint: Patient presents with:  Stroke: New patient here for evaluation for CVA 11/12/2018. Daughter here as .  States patient continues with right arm weakness, gtu uncontrolled    • Unspecified essential hypertension        Past Surgical History:   Procedure Laterality Date   • CATARACT EXTRACTION W/  INTRAOCULAR LENS IMPLANT Left 1/11/16    RJM   • CATARACT EXTRACTION W/  INTRAOCULAR LENS IMPLANT Right 2/1/16    RJM total) by Per G Tube route nightly., Disp: 90 tablet, Rfl: 0  •  Losartan Potassium 50 MG Oral Tab, Take 1 tablet (50 mg total) by mouth 2 (two) times daily. , Disp: 180 tablet, Rfl: 3  •  Blood Glucose Monitoring Suppl (BLOOD GLUCOSE MONITOR SYSTEM) w/Radha extremities 5/5 proximally and distally    Sensory Exam:  Light touch sensation- intact in all 4 extremities    Deep Tendon Reflexes:  Biceps 2+ bilateral symmetric  Triceps 2+ bilateral symmetric  Brachioradialis 2 + bilateral symmetric  Patellar 2+ bilat EXTERNAL           Education and counseling provided to patient. Instructed patient to call my office or seek medical attention immediately if symptoms worsen. Patient verbalized understanding of information given. All questions were answered.  All side ef

## 2019-02-07 NOTE — TELEPHONE ENCOUNTER
L/m [employer] was verified and neuropsychological testing cpt codes   59947+ 41955, 48138+ 91732  is a covered benefit and it does not require authorization. Left detailed message so she can call [facility] to schedule appt.

## 2019-02-11 ENCOUNTER — PATIENT MESSAGE (OUTPATIENT)
Dept: FAMILY MEDICINE CLINIC | Facility: CLINIC | Age: 79
End: 2019-02-11

## 2019-02-11 DIAGNOSIS — Z95.818 PRESENCE OF CARDIAC DEVICE: Primary | ICD-10-CM

## 2019-02-11 NOTE — TELEPHONE ENCOUNTER
Per 2/7/19 office visit notes  She was encouraged to follow-up with cardiology for the monitoring of the cardiac LINQ device        From: José Diaz  To: Teresa Vaz MD  Sent: 2/11/2019  7:57 AM CST  Subject: Referral Request    Marlee Wilson,  The ne

## 2019-02-13 ENCOUNTER — OFFICE VISIT (OUTPATIENT)
Dept: SPEECH THERAPY | Facility: HOSPITAL | Age: 79
End: 2019-02-13
Attending: FAMILY MEDICINE
Payer: MEDICARE

## 2019-02-13 DIAGNOSIS — Z86.73 HISTORY OF CVA (CEREBROVASCULAR ACCIDENT): ICD-10-CM

## 2019-02-13 DIAGNOSIS — R13.10 DYSPHAGIA, UNSPECIFIED TYPE: ICD-10-CM

## 2019-02-13 PROCEDURE — 92523 SPEECH SOUND LANG COMPREHEN: CPT

## 2019-02-13 PROCEDURE — 92610 EVALUATE SWALLOWING FUNCTION: CPT

## 2019-02-13 NOTE — PATIENT INSTRUCTIONS
SWALLOWING INSTRUCTIONS    DIET:  Pureed Solids   Nectar Thickened Liquids   Supervision     ____ SIT UPRIGHT    ____ SMALL BITES AND SIPS    ____ EAT SLOWLY    ____ ALTERNATED LIQUIDS AND SOLIDS    ____ Michael Ringgold WITH EACH BITE    ____ NO Lanny Pena

## 2019-02-13 NOTE — PROGRESS NOTES
ADULT SPEECH/SWALLOWING EVALUATION:   Referring Physician: Dr. Tricia Brock  Oropharyngeal dysphagia   S/L cognitive disorder  Date of Service: 2/13/2019     PATIENT SUMMARY:   Dm Broderick is a 69year old y/o female who presents to therapy today with complaint mechanism ([facility]   Noted: 11/14/2018   Lipoma of torso   Noted: 6/13/2018   Chronic bilateral low back pain with bilateral sciatica   Noted: 6/6/2018   Fluctuating blood pressure   Noted: 12/13/2017   History of CVA (cerebrovascular accident)   Noted: 9/27/ retraction resulting in trace residue on base of tongue and in the valleculae.      Impairments of the oral and  pharyngeal swallow resulted in laryngeal penetration of thin liquids, secondary swallow effective in clearing residue.       VFSS 11/19/18   Ov sneeze and delayed cough post trials of thin liquids via TSP. Trials d/c.   No clinical signs of aspiration (e.g., immediate/delayed throat clear, immediate/delayed cough, wet vocal quality, increased O2 effort) observed across pureed solids and nectar thic self-feeding  Patient Positionin Degrees in bedside chair     Oral phase of swallow impaired  Lingual Motion impaired , Mastication impaired, Bolus formation impaired, Oral transit impaired  and Residue impaired     Pharyngeal Phase of Swallow impaire provided minimal support. 5. Pt will use a memory book and/or calendar to recall personal information with 80% accuracy provided minimal support. Frequency / Duration: Patient will be seen for 1 x/week or a total of 8 visits over a 90 day period.   Margie Morales

## 2019-02-14 ENCOUNTER — PATIENT MESSAGE (OUTPATIENT)
Dept: CARDIOLOGY CLINIC | Facility: CLINIC | Age: 79
End: 2019-02-14

## 2019-02-14 NOTE — TELEPHONE ENCOUNTER
From: Chino Jackson  To: Humberto Huynh MD  Sent: 2/14/2019 8:24 AM CST  Subject: Non-Urgent Medical Question    Hello,  I made an appointment for March 11th @ 9:15, but i do not see it on my chart ? i just want to confirm .   Thanks,

## 2019-02-18 ENCOUNTER — OFFICE VISIT (OUTPATIENT)
Dept: SPEECH THERAPY | Facility: HOSPITAL | Age: 79
End: 2019-02-18
Attending: FAMILY MEDICINE
Payer: MEDICARE

## 2019-02-18 DIAGNOSIS — Z86.73 HISTORY OF CVA (CEREBROVASCULAR ACCIDENT): ICD-10-CM

## 2019-02-18 DIAGNOSIS — R13.10 DYSPHAGIA, UNSPECIFIED TYPE: ICD-10-CM

## 2019-02-18 PROCEDURE — 92507 TX SP LANG VOICE COMM INDIV: CPT

## 2019-02-18 PROCEDURE — 92526 ORAL FUNCTION THERAPY: CPT

## 2019-02-18 NOTE — PROGRESS NOTES
Diagnosis:  Dysphagia/ S/L   Authorized # of Visits:  8       Next MD visit: none scheduled  Fall Risk: standard         Precautions: n/a           Medication Changes since last visit?: No  Subjective: Pt reports she is tolerating her diet well.  Pt request minimal support. 2. Pt will correctly respond to abstract Y/N questions with 80% accuracy provided minimal support. 3. Pt will recall biographical/temporal information with 80% accuracy provided minimal support.    4. Pt will participate in 3 word menta

## 2019-02-19 ENCOUNTER — OFFICE VISIT (OUTPATIENT)
Dept: OCCUPATIONAL MEDICINE | Facility: HOSPITAL | Age: 79
End: 2019-02-19
Attending: Other
Payer: MEDICARE

## 2019-02-19 DIAGNOSIS — I63.9 CEREBROVASCULAR ACCIDENT (CVA), UNSPECIFIED MECHANISM (HCC): ICD-10-CM

## 2019-02-19 PROCEDURE — 97167 OT EVAL HIGH COMPLEX 60 MIN: CPT | Performed by: OCCUPATIONAL THERAPIST

## 2019-02-19 PROCEDURE — 97166 OT EVAL MOD COMPLEX 45 MIN: CPT

## 2019-02-19 NOTE — PATIENT INSTRUCTIONS
1 Calendar   2 Memory   3 F/U with JUDI Sotelo M.S. [id number] [facility]  Speech Language Pathologist   Phone Number [phone]

## 2019-02-19 NOTE — PROGRESS NOTES
OCCUPATIONAL THERAPY NEURO EVALUATION:.   Referring Physician: Dr. Nicole Moffett  Date of Onset: 11/12/2018 Date of Service: 2/19/2019   Diagnosis: C[facility]brovascular accident (CVA), unspecified [facility] ([facility] (I63.9)  PATIENT SUMMARY:   Zoe Baig is a 69 y sensation in right hand was impaired. Patient required redirection to complete tasks and has difficulty following directions. Cognition and vision difficult to assess due to difficulty with communication.  Given the above noted deficits in strength and inat Goals:  Patient will be at supervision level with HEP for right hand strengthening. Patient will increase right  strength to a functional 20 lbs for ease in bathing self.   Patient will increase right lateral pinch strength to at least 6 lbs and 3 po

## 2019-02-19 NOTE — PROGRESS NOTES
OCCUPATIONAL THERAPY NEURO EVALUATION:.   Referring Physician: Dr. Kevin Urena  Date of Onset: 11/12/2018 Date of Service: 2/19/2019   Diagnosis: C[facility]brovascular accident (CVA), unspecified mechanism ([facility] (I63.9)  PATIENT SUMMARY:   Elian Eng is a 69 y sensation in right hand was impaired. Patient required redirection to complete tasks and has difficulty following directions. Cognition and vision difficult to assess due to difficulty with communication.  Given the above noted deficits in strength and inat Goals:  Patient will be at supervision level with HEP for right hand strengthening. Patient will increase right  strength to a functional 20 lbs for ease in bathing self.   Patient will increase right lateral pinch strength to at least 6 lbs and 3 po

## 2019-02-20 ENCOUNTER — TELEPHONE (OUTPATIENT)
Dept: NEUROLOGY | Facility: CLINIC | Age: 79
End: 2019-02-20

## 2019-02-20 ENCOUNTER — PATIENT MESSAGE (OUTPATIENT)
Dept: NEUROLOGY | Facility: CLINIC | Age: 79
End: 2019-02-20

## 2019-02-20 ENCOUNTER — OFFICE VISIT (OUTPATIENT)
Dept: SPEECH THERAPY | Facility: HOSPITAL | Age: 79
End: 2019-02-20
Attending: FAMILY MEDICINE
Payer: MEDICARE

## 2019-02-20 DIAGNOSIS — I63.10 CEREBROVASCULAR ACCIDENT (CVA) DUE TO EMBOLISM OF PRECEREBRAL ARTERY (HCC): Primary | ICD-10-CM

## 2019-02-20 DIAGNOSIS — R13.10 DYSPHAGIA, UNSPECIFIED TYPE: ICD-10-CM

## 2019-02-20 DIAGNOSIS — Z86.73 HISTORY OF CVA (CEREBROVASCULAR ACCIDENT): ICD-10-CM

## 2019-02-20 PROCEDURE — 92507 TX SP LANG VOICE COMM INDIV: CPT

## 2019-02-20 PROCEDURE — 92526 ORAL FUNCTION THERAPY: CPT

## 2019-02-20 NOTE — TELEPHONE ENCOUNTER
MRI brain wo cpt code [address] >As part of our Prior Authorization Reduction program, [employer] no longer requires prior authorization for CT, MRI/MRA and transthoracic echocardiography procedures for Medicare members eff

## 2019-02-20 NOTE — TELEPHONE ENCOUNTER
From: Jael Annt  To: Bryon Alonso MD  Sent: 2/20/2019 8:20 AM CST  Subject: Other    Alekslo DrRafael   My mom's memory seems to be getting worse .  she is starting to forget her children and grandchildren's names   These are people she sees very ofte

## 2019-02-20 NOTE — PROGRESS NOTES
Diagnosis:  Dysphagia/ S/L   Authorized # of Visits:  8       Next MD visit: none scheduled  Fall Risk: standard         Precautions: n/a           Medication Changes since last visit?: No  Subjective: Family reports, \"She has an appointment in April 2019 Family reports pt is drinking water and thin liquids at home. She is not drinking the thickener at home. Pt's daughter states, \"I'm not using the thickener at home anymore. \" SLP thoroughly reviewed aspiration precautions with the patient d/t non-compli 3. Pt will recall biographical/temporal information with 80% accuracy provided minimal support. 4. Pt will participate in 3 word mental manipulation lists with 80% accuracy provided minimal support.    5. Pt will use a memory book and/or calendar to rec

## 2019-02-20 NOTE — PROGRESS NOTES
It is definitely worth checking it out. I will order MRI of the brain again.   Please reach out to family and let them know about that

## 2019-02-20 NOTE — TELEPHONE ENCOUNTER
Spoke to patients daughter Willy loya and informed her of Dr. Gómez Mueller response.  Willy loya verbalized understanding and given the number to [facility] scheduling to schedule once insurance approval is obtain

## 2019-02-21 ENCOUNTER — OFFICE VISIT (OUTPATIENT)
Dept: OCCUPATIONAL MEDICINE | Facility: HOSPITAL | Age: 79
End: 2019-02-21
Attending: Other
Payer: MEDICARE

## 2019-02-21 DIAGNOSIS — I63.9 CEREBROVASCULAR ACCIDENT (CVA), UNSPECIFIED MECHANISM (HCC): ICD-10-CM

## 2019-02-21 PROCEDURE — 97530 THERAPEUTIC ACTIVITIES: CPT | Performed by: OCCUPATIONAL THERAPIST

## 2019-02-21 PROCEDURE — 97110 THERAPEUTIC EXERCISES: CPT | Performed by: OCCUPATIONAL THERAPIST

## 2019-02-21 NOTE — PROGRESS NOTES
Diagnosis: Cerebrovascular accident (CVA), unspecified mechanism ([facility] [address] (I63.9)  Authorized # of Visits:  8         Next MD visit: none scheduled  Fall Risk: standard         Precautions: dementia, swallow precaution (thickened liquid)       Medication Ch visual perception by 3rd visit. Plan: Continue to focus on fine motor skills and strengthening.         Charges: TA2TE    Total Timed Treatment: 45 min  Total Treatment Time: 45 min

## 2019-02-25 ENCOUNTER — OFFICE VISIT (OUTPATIENT)
Dept: SPEECH THERAPY | Facility: HOSPITAL | Age: 79
End: 2019-02-25
Attending: FAMILY MEDICINE
Payer: MEDICARE

## 2019-02-25 DIAGNOSIS — Z86.73 HISTORY OF CVA (CEREBROVASCULAR ACCIDENT): ICD-10-CM

## 2019-02-25 DIAGNOSIS — R13.10 DYSPHAGIA, UNSPECIFIED TYPE: ICD-10-CM

## 2019-02-25 PROCEDURE — 92507 TX SP LANG VOICE COMM INDIV: CPT

## 2019-02-25 PROCEDURE — 92526 ORAL FUNCTION THERAPY: CPT

## 2019-02-25 NOTE — PATIENT INSTRUCTIONS
1. Continue swallowing exercises   2. Please continue to use memory book   3.  Please target memory activities       Rory Flores M.S. [id number] [facility]  Speech Language Pathologist   Phone Number [phone]

## 2019-02-25 NOTE — PROGRESS NOTES
Diagnosis:  Dysphagia/ S/L   Authorized # of Visits:  8       Next MD visit: none scheduled  Fall Risk: standard         Precautions: n/a           Medication Changes since last visit?: No  Subjective:  Pt reports she has been doing her exercises.    Object 2/18/2019   Dated: 20  Month: February Year 2019   ROSA M: Wednesday  Season: Winter   90% accuracy  Date: 25  Month: February Year: 2019   ROSA M: Monday   Season: \"Entering Spring\"     Daughter's Names:   Jarod Hamm     Son's Name:  Moira Howe overt CSA for 100% of trials. 2. Pt will participate in lingual, labial, BOT, laryngeal adduction, and laryngeal elevation dysphagia exercises to improve oral and pharyngeal musculature x10 repetitions each.    3. Pt and family will demonstrate understand

## 2019-02-26 ENCOUNTER — OFFICE VISIT (OUTPATIENT)
Dept: OCCUPATIONAL MEDICINE | Facility: HOSPITAL | Age: 79
End: 2019-02-26
Attending: Other
Payer: MEDICARE

## 2019-02-26 DIAGNOSIS — I63.9 CEREBROVASCULAR ACCIDENT (CVA), UNSPECIFIED MECHANISM (HCC): ICD-10-CM

## 2019-02-26 PROCEDURE — 97110 THERAPEUTIC EXERCISES: CPT

## 2019-02-26 PROCEDURE — 97530 THERAPEUTIC ACTIVITIES: CPT

## 2019-02-26 NOTE — PROGRESS NOTES
Diagnosis: Cerebrovascular accident (CVA), unspecified mechanism ([facility] (I63.9)  Authorized # of Visits:  8         Next MD visit: none scheduled  Fall Risk: standard         Precautions: dementia, swallow precaution (thickened liquid)       Medication Ch with demonstration and some assistance. Struggled to open most containers, patients grandson noted she has struggled with some even before the stroke. Patient able to recall therapist name at the end of the session.        Goals:     Patient will be at [address] [place] [address]

## 2019-02-27 ENCOUNTER — HOSPITAL ENCOUNTER (OUTPATIENT)
Dept: MRI IMAGING | Facility: HOSPITAL | Age: 79
Discharge: HOME | End: 2019-02-27
Attending: Other
Payer: MEDICARE

## 2019-02-27 ENCOUNTER — OFFICE VISIT (OUTPATIENT)
Dept: SPEECH THERAPY | Facility: HOSPITAL | Age: 79
End: 2019-02-27
Attending: FAMILY MEDICINE
Payer: MEDICARE

## 2019-02-27 DIAGNOSIS — I63.10 CEREBROVASCULAR ACCIDENT (CVA) DUE TO EMBOLISM OF PRECEREBRAL ARTERY (HCC): ICD-10-CM

## 2019-02-27 DIAGNOSIS — R13.10 DYSPHAGIA, UNSPECIFIED TYPE: ICD-10-CM

## 2019-02-27 DIAGNOSIS — Z86.73 HISTORY OF CVA (CEREBROVASCULAR ACCIDENT): ICD-10-CM

## 2019-02-27 PROCEDURE — 92507 TX SP LANG VOICE COMM INDIV: CPT

## 2019-02-27 PROCEDURE — 70551 MRI BRAIN STEM W/O DYE: CPT | Performed by: OTHER

## 2019-02-27 PROCEDURE — 92526 ORAL FUNCTION THERAPY: CPT

## 2019-02-27 NOTE — PROGRESS NOTES
Diagnosis:  Dysphagia/ S/L   Authorized # of Visits:  8       Next MD visit: none scheduled  Fall Risk: standard         Precautions: n/a           Medication Changes since last visit?: No  Subjective:  Family participated in today's session.    Objective: effortful swallow  x5 effortful       8 items in a concrete category  Colors 4 independently     Stores: 2 independently     Clothes 2 Fruits:  5   Girls Names: 7          Animals: 8 animals in 1-minute       Abstract y/n questions  Questions  Not targeted Mechanical Soft Ground Diet information provided for the family. Recommend ground solids and nectar thickened liquids via cup. Dysphagia exercises provided for generalization purposes. Patient to practice 2-3x daily.      Speech Therapy   In regards to spee

## 2019-02-28 ENCOUNTER — PATIENT MESSAGE (OUTPATIENT)
Dept: GASTROENTEROLOGY | Facility: CLINIC | Age: 79
End: 2019-02-28

## 2019-02-28 NOTE — PROGRESS NOTES
Pavlik, Renette Opitz, SLP   You 38 minutes ago (9:10 AM)      Good morning,   She has been tolerating her diet without any overt clinical signs of aspiration. The family has reported that she is not using the G-tube at home. Everything is P.O.  I did not may an

## 2019-02-28 NOTE — PROGRESS NOTES
Dr. Romina Casas-  Please advise on message below, thank you. Jeb Quinonez.- based on your SLP assessment, do you advise PEG tube be removed? Please advise, thank you.

## 2019-03-04 ENCOUNTER — OFFICE VISIT (OUTPATIENT)
Dept: OCCUPATIONAL MEDICINE | Facility: HOSPITAL | Age: 79
End: 2019-03-04
Attending: Other
Payer: MEDICARE

## 2019-03-04 DIAGNOSIS — I63.9 CEREBROVASCULAR ACCIDENT (CVA), UNSPECIFIED MECHANISM (HCC): ICD-10-CM

## 2019-03-04 PROCEDURE — 97530 THERAPEUTIC ACTIVITIES: CPT | Performed by: OCCUPATIONAL THERAPIST

## 2019-03-04 NOTE — PROGRESS NOTES
GI Staff:    Please let her daughter know I do see the progress she is making with speech therapy. Please have her make an appointment with me (non-urgently) and please ask that her daughter is there with her.  This type of tube can be removed in the office

## 2019-03-04 NOTE — PROGRESS NOTES
Diagnosis: Cerebrovascular accident (CVA), unspecified mechanism ([facility] (I63.9)  Authorized # of Visits:  8         Next MD visit: none scheduled  Fall Risk: standard         Precautions: dementia, swallow precaution (thickened liquid)       Medication Ch alternate side on tree, different levels         Velcro checkers         Parquetry assembly,            Assessment: Patient demonstrated improved memory today, successful recall two therapist names.  Treatment included repeating simple sequence- alternate p

## 2019-03-06 ENCOUNTER — OFFICE VISIT (OUTPATIENT)
Dept: OCCUPATIONAL MEDICINE | Facility: HOSPITAL | Age: 79
End: 2019-03-06
Attending: Other
Payer: MEDICARE

## 2019-03-06 DIAGNOSIS — I63.9 CEREBROVASCULAR ACCIDENT (CVA), UNSPECIFIED MECHANISM (HCC): ICD-10-CM

## 2019-03-06 PROCEDURE — 97530 THERAPEUTIC ACTIVITIES: CPT | Performed by: OCCUPATIONAL THERAPIST

## 2019-03-06 NOTE — PROGRESS NOTES
Diagnosis: Cerebrovascular accident (CVA), unspecified mechanism ([facility] (I63.9)  Authorized # of Visits:  8         Next MD visit: none scheduled  Fall Risk: standard         Precautions: dementia, swallow precaution (thickened liquid)       Medication Ch into pitcher and pouring contents from sens bin #3 x 4 Functional activity - opening and closing 8 different containers  Functional activity, rolling green putty with rolling pin      Green putty , roll, , twist pull and pinch 10 min Green putty , roll

## 2019-03-09 RX ORDER — LOSARTAN POTASSIUM 50 MG/1
50 TABLET ORAL 2 TIMES DAILY
Qty: 180 TABLET | Refills: 0 | Status: SHIPPED | OUTPATIENT
Start: 2019-03-09 | End: 2019-04-29

## 2019-03-09 RX ORDER — METOPROLOL TARTRATE 50 MG/1
50 TABLET, FILM COATED ORAL
Qty: 60 TABLET | Refills: 2 | Status: SHIPPED | OUTPATIENT
Start: 2019-03-09 | End: 2019-06-25

## 2019-03-09 RX ORDER — ALENDRONATE SODIUM 70 MG/1
70 TABLET ORAL WEEKLY
Qty: 12 TABLET | Refills: 0 | Status: SHIPPED | OUTPATIENT
Start: 2019-03-09 | End: 2019-06-25

## 2019-03-09 RX ORDER — PAROXETINE 10 MG/1
10 TABLET, FILM COATED ORAL
Qty: 90 TABLET | Refills: 0 | Status: SHIPPED | OUTPATIENT
Start: 2019-03-09 | End: 2020-02-24

## 2019-03-09 RX ORDER — AMLODIPINE BESYLATE 5 MG/1
5 TABLET ORAL NIGHTLY
Qty: 90 TABLET | Refills: 0 | Status: SHIPPED | OUTPATIENT
Start: 2019-03-09 | End: 2019-04-29

## 2019-03-09 RX ORDER — ATORVASTATIN CALCIUM 80 MG/1
80 TABLET, FILM COATED ORAL NIGHTLY
Qty: 90 TABLET | Refills: 0 | Status: SHIPPED | OUTPATIENT
Start: 2019-03-09 | End: 2019-04-29

## 2019-03-09 RX ORDER — ASPIRIN 325 MG
325 TABLET ORAL DAILY
Qty: 30 TABLET | Refills: 2 | Status: SHIPPED | OUTPATIENT
Start: 2019-03-09

## 2019-03-09 RX ORDER — FAMOTIDINE 20 MG/1
TABLET ORAL
Qty: 30 TABLET | Refills: 0 | Status: SHIPPED | OUTPATIENT
Start: 2019-03-09 | End: 2019-06-25

## 2019-03-10 NOTE — TELEPHONE ENCOUNTER
Refill passed per [facility] protocol.       Hypertensive Medications  Protocol Criteria:  · Appointment scheduled in the past 6 months or in the next 3 months  · BMP or CMP in the past 12 months  · Creatinine result < 2  Recent Outpatient Visits Lab Results   Component Value Date     (H) 11/21/2018    BUN 19 11/21/2018    CREATSERUM 0.76 11/21/2018    BUNCREA 25.0 (H) 11/21/2018    GFRNAA 76 11/21/2018    GFRAA 88 11/21/2018    CA 8.3 11/21/2018    ALKPHOS 76 01/09/2016    AST 21 07/ & update reg-- info not available at time of scheduling    In 1 week SP DEVICE REMOTE TX SP CARDIOLOGY 31 Day Linq    In 2 weeks Zee Daniels, [facility], [place] Occupational Therapy [facility] MEDICARE   NO C/P  pls scan & update reg-- info not availa Linq    In 2 weeks Cony Daniels, [address] [facility] Occupational Therapy [facility] MEDICARE   NO C/P  pls scan & update reg-- info not available at time of scheduling    In 3 weeks Megan Felder MD [facility], [facility], [facility] follow f

## 2019-03-11 ENCOUNTER — OFFICE VISIT (OUTPATIENT)
Dept: OCCUPATIONAL MEDICINE | Facility: HOSPITAL | Age: 79
End: 2019-03-11
Attending: Other
Payer: MEDICARE

## 2019-03-11 DIAGNOSIS — I63.9 CEREBROVASCULAR ACCIDENT (CVA), UNSPECIFIED MECHANISM (HCC): ICD-10-CM

## 2019-03-11 PROCEDURE — 97530 THERAPEUTIC ACTIVITIES: CPT | Performed by: OCCUPATIONAL THERAPIST

## 2019-03-11 NOTE — PROGRESS NOTES
Diagnosis: Cerebrovascular accident (CVA), unspecified mechanism ([facility] [address] (I63.9)  Authorized # of Visits:  8         Next MD visit: none scheduled  Fall Risk: standard         Precautions: dementia, swallow precaution (thickened liquid)       Medication Ch for 12 large chips Functional grasping and reaching, scooping into pitcher and pouring contents from sens bin #3 x 4 Functional grasping and reaching, scooping into pan and pouring contents from sens bin #3 x 4 Functional activity, using fork and knife to (Achieved)      Plan: Continue to focus on fine motor skills and strengthening.      Charges: TA3    Total Timed Treatment: 45 min  Total Treatment Time: 45 min

## 2019-03-12 ENCOUNTER — OFFICE VISIT (OUTPATIENT)
Dept: OCCUPATIONAL MEDICINE | Facility: HOSPITAL | Age: 79
End: 2019-03-12
Attending: Other
Payer: MEDICARE

## 2019-03-12 DIAGNOSIS — I63.9 CEREBROVASCULAR ACCIDENT (CVA), UNSPECIFIED MECHANISM (HCC): ICD-10-CM

## 2019-03-12 PROCEDURE — 97530 THERAPEUTIC ACTIVITIES: CPT | Performed by: OCCUPATIONAL THERAPIST

## 2019-03-12 NOTE — PROGRESS NOTES
Diagnosis: Cerebrovascular accident (CVA), unspecified mechanism ([facility] [address] [facility] (I63.9)  Authorized # of Visits:  8         Next MD visit: none scheduled  Fall Risk: standard         Precautions: dementia, swallow precaution (thickened liquid)       Medication Ch recall two, three, four, and five shapes     Sensory reeducation, sens bin #3, feel for 12 large chips Functional grasping and reaching, scooping into pitcher and pouring contents from sens bin #3 x 4 Functional grasping and reaching, scooping into pan and progress toward)  Patient will increase right  strength to a functional 20 lbs for ease in bathing self. Patient will increase right lateral pinch strength to at least 6 lbs and 3 point pinch to at least 3 lbs for ease in buttoning.    Patient will Liliana Kumar

## 2019-03-13 ENCOUNTER — OFFICE VISIT (OUTPATIENT)
Dept: SPEECH THERAPY | Facility: HOSPITAL | Age: 79
End: 2019-03-13
Attending: FAMILY MEDICINE
Payer: MEDICARE

## 2019-03-13 ENCOUNTER — TELEPHONE (OUTPATIENT)
Dept: FAMILY MEDICINE CLINIC | Facility: CLINIC | Age: 79
End: 2019-03-13

## 2019-03-13 DIAGNOSIS — Z86.73 HISTORY OF CVA (CEREBROVASCULAR ACCIDENT): ICD-10-CM

## 2019-03-13 DIAGNOSIS — R13.10 DYSPHAGIA, UNSPECIFIED TYPE: ICD-10-CM

## 2019-03-13 DIAGNOSIS — R13.10 DYSPHAGIA, UNSPECIFIED TYPE: Primary | ICD-10-CM

## 2019-03-13 PROCEDURE — 92507 TX SP LANG VOICE COMM INDIV: CPT

## 2019-03-13 PROCEDURE — 92526 ORAL FUNCTION THERAPY: CPT

## 2019-03-13 NOTE — PROGRESS NOTES
Diagnosis:  Dysphagia/ S/L   Authorized # of Visits:  8       Next MD visit: none scheduled  Fall Risk: standard         Precautions: n/a           Medication Changes since last visit?: No  Subjective:  Family participated in today's session.  Family report Lingual  Exercises  x20  x20 ROM  x20 tongue tip elevation    x20 tongue tip elevation    x20 ROM lingual  x20 ROM     BOT  Unable to coordinate tongue retraction     x10 lingual elevation     x10 K words     x10 G words  X10 tongue tip elevation     xK word manipulation 30% accuracy  Not targeted in today's session.   2 word manipulation   80% accuracy     3 word manipulation 33% accuracy w/ maximal support  3- word mental manipulation 40% accuracy target  60% Accuracy maximal support       Recall immedia 2. Pt will participate in lingual, labial, BOT, laryngeal adduction, and laryngeal elevation dysphagia exercises to improve oral and pharyngeal musculature x10 repetitions each.    3. Pt and family will demonstrate understanding of swallow strategies and

## 2019-03-14 ENCOUNTER — APPOINTMENT (OUTPATIENT)
Dept: LAB | Facility: HOSPITAL | Age: 79
End: 2019-03-14
Attending: Other
Payer: MEDICARE

## 2019-03-14 DIAGNOSIS — I63.9 CEREBROVASCULAR ACCIDENT (CVA), UNSPECIFIED MECHANISM (HCC): ICD-10-CM

## 2019-03-14 LAB
CHOLEST SMN-MCNC: 113 MG/DL (ref ?–200)
HDLC SERPL-MCNC: 47 MG/DL (ref 40–59)
LDLC SERPL CALC-MCNC: 52 MG/DL (ref ?–100)
NONHDLC SERPL-MCNC: 66 MG/DL (ref ?–130)
TRIGL SERPL-MCNC: 71 MG/DL (ref 30–149)
VLDLC SERPL CALC-MCNC: 14 MG/DL (ref 0–30)

## 2019-03-14 PROCEDURE — 36415 COLL VENOUS BLD VENIPUNCTURE: CPT

## 2019-03-14 PROCEDURE — 80061 LIPID PANEL: CPT

## 2019-03-14 NOTE — TELEPHONE ENCOUNTER
----- Message from Barbara Madden SLP sent at 3/13/2019  5:08 PM CDT -----  Regarding: Video Swallow Orders   Good afternoon,    I have been working with your patient in dysphagia therapy.  Prior to upgrading her to thin liquids, could your office send ov

## 2019-03-15 ENCOUNTER — TELEPHONE (OUTPATIENT)
Dept: NEUROLOGY | Facility: CLINIC | Age: 79
End: 2019-03-15

## 2019-03-15 NOTE — TELEPHONE ENCOUNTER
----- Message from Gladys Cui MD sent at 3/15/2019  9:25 AM CDT -----  Please let the patient know that results of these particular lab tests so far were normal.  Significant improvement of her cholesterol    Thank you

## 2019-03-18 ENCOUNTER — TELEPHONE (OUTPATIENT)
Dept: FAMILY MEDICINE CLINIC | Facility: CLINIC | Age: 79
End: 2019-03-18

## 2019-03-18 RX ORDER — DIPHENHYDRAMINE HYDROCHLORIDE 25 MG/1
CAPSULE, LIQUID FILLED ORAL
Qty: 1 KIT | Refills: 0 | Status: SHIPPED | OUTPATIENT
Start: 2019-03-18 | End: 2021-04-19

## 2019-03-18 RX ORDER — GLUCOSAMINE HCL/CHONDROITIN SU 500-400 MG
CAPSULE ORAL
Qty: 200 STRIP | Refills: 0 | Status: SHIPPED | OUTPATIENT
Start: 2019-03-18 | End: 2021-04-19

## 2019-03-18 NOTE — TELEPHONE ENCOUNTER
Scripts sent per protocol.     Refill Protocol Appointment Criteria  · Appointment scheduled in the past 12 months or in the next 3 months  Recent Outpatient Visits            5 days ago Dysphagia, unspecified type    [facility] Speech Pathology [place]

## 2019-03-18 NOTE — TELEPHONE ENCOUNTER
Pharmacy called in stating that Pt was requesting more testing strips, however, her insurance no longer covers True Metrix. They will cover OneTouch or Accu-Chek. Pharmacy requesting new script for all glucose monitoring supplies. Please advise.

## 2019-03-19 ENCOUNTER — HOSPITAL ENCOUNTER (OUTPATIENT)
Dept: GENERAL RADIOLOGY | Facility: HOSPITAL | Age: 79
Discharge: HOME | End: 2019-03-19
Attending: FAMILY MEDICINE
Payer: MEDICARE

## 2019-03-19 DIAGNOSIS — Z86.73 HISTORY OF CVA (CEREBROVASCULAR ACCIDENT): ICD-10-CM

## 2019-03-19 DIAGNOSIS — R13.10 DYSPHAGIA, UNSPECIFIED TYPE: ICD-10-CM

## 2019-03-19 PROCEDURE — 74230 X-RAY XM SWLNG FUNCJ C+: CPT | Performed by: FAMILY MEDICINE

## 2019-03-19 PROCEDURE — 92611 MOTION FLUOROSCOPY/SWALLOW: CPT

## 2019-03-19 NOTE — PROGRESS NOTES
ADULT VIDEOFLUOROSCOPIC SWALLOWING STUDY    Referring Physician: Dr. Anna Matson  Diagnosis: dysphagia   Date of Service: 3/19/2019   Radiologist: Dr. Cathy Conde results and/or plan of treatment.     HISTORY   [facility] results: no recent CXR    ASSESSMENT   DYSPHAGIA ASSESSMENT  Test completed in conjunction with Radiologist.   Food/Liquid Types Presented: puree, solid, nectar thick liquid and thin liquids.     Study Position and View:  Patient was seated up should also continue completing dysphagia exercises once per day for at least one month, however, she should complete all lingual exercises to improve lingual control of thin liquid bolus 3 times per day.     FCM category and level: Swallowing, 6  Current s me at Dept: [phone]. Please co-sign or sign and return this letter via fax as soon as possible to No information on file. Unknown Jesenia RUSH/JESSICA-SLP  Speech Language Pathologist  [facility]  [phone]        Electronically

## 2019-03-19 NOTE — PATIENT INSTRUCTIONS
SWALLOW INSTRUCTIONS    DIET:  SOT FOODS AND REGULAR/THIN LIQUIDS. Minor Bitter may have foods such as pasta, fish, soft cooked chicken, cooked vegetables. Avoid dual consistencies or you may try them one at a time.   If coughing, throat clearing or wet vocal qual

## 2019-03-21 ENCOUNTER — OFFICE VISIT (OUTPATIENT)
Dept: OCCUPATIONAL MEDICINE | Facility: HOSPITAL | Age: 79
End: 2019-03-21
Attending: Other
Payer: MEDICARE

## 2019-03-21 DIAGNOSIS — I63.9 CEREBROVASCULAR ACCIDENT (CVA), UNSPECIFIED MECHANISM (HCC): ICD-10-CM

## 2019-03-21 PROCEDURE — 97530 THERAPEUTIC ACTIVITIES: CPT

## 2019-03-21 NOTE — PROGRESS NOTES
Patient Name: Holley Kirk, : 5/10/1940, MRN: [id number]   Date:  3/21/2019  Referring Physician:  Renita Fernández    Diagnosis:  Cerebrovascular accident (CVA), unspecified mechanism ([facility] (I63.9)      Discharge Summary OT    Pt has attended 8, ca STM of right hand 5 min STM of right hand 5 min Red web wrist flex/ext stretches 10 sec x 8 Red web wrist flex/ext stretches 10 sec x 8 Red web wrist flex/ext stretches 10 sec x 8 Red web wrist flex/ext stretches 5 sec x 8 Blue web wrist flex/ext stretch pitcher and pouring contents from sens bin #3 x 4 Functional activity - opening and closing 8 different containers  Functional activity, rolling green putty with rolling pin Green putty, , twist, pinch Red putty, , twist, pull and pinch 5 min    Gr min    Patient and grandson were advised of these findings, precautions, and treatment options and has agreed to actively participate in planning and for this course of care.     Thank you for your referral. Please co-sign or sign and return this letter via

## 2019-03-21 NOTE — PROGRESS NOTES
Patient Name: Tamara Dubon, : 5/10/1940, MRN: [id number]   Date:  3/21/2019  Referring Physician:  Kashmir Mccollum    Diagnosis:  Cerebrovascular accident (CVA), unspecified mechanism ([facility] (I63.9)      Discharge Summary OT    Pt has attended 8, ca STM of right hand 5 min STM of right hand 5 min Red web wrist flex/ext stretches 10 sec x 8 Red web wrist flex/ext stretches 10 sec x 8 Red web wrist flex/ext stretches 10 sec x 8 Red web wrist flex/ext stretches 5 sec x 8 Blue web wrist flex/ext stretch pitcher and pouring contents from sens bin #3 x 4 Functional activity - opening and closing 8 different containers  Functional activity, rolling green putty with rolling pin Green putty, , twist, pinch Red putty, , twist, pull and pinch 5 min    Gr min    Patient and grandson were advised of these findings, precautions, and treatment options and has agreed to actively participate in planning and for this course of care.     Thank you for your referral. Please co-sign or sign and return this letter via

## 2019-03-25 ENCOUNTER — APPOINTMENT (OUTPATIENT)
Dept: OCCUPATIONAL MEDICINE | Facility: HOSPITAL | Age: 79
End: 2019-03-25
Attending: Other
Payer: MEDICARE

## 2019-03-26 RX ORDER — AMLODIPINE BESYLATE 5 MG/1
TABLET ORAL
Qty: 90 TABLET | Refills: 0 | OUTPATIENT
Start: 2019-03-26

## 2019-03-27 ENCOUNTER — APPOINTMENT (OUTPATIENT)
Dept: CT IMAGING | Facility: HOSPITAL | Age: 79
End: 2019-03-27
Payer: MEDICARE

## 2019-03-27 ENCOUNTER — HOSPITAL ENCOUNTER (OUTPATIENT)
Facility: HOSPITAL | Age: 79
Setting detail: OBSERVATION
LOS: 1 days | Discharge: HOME | End: 2019-03-29
Attending: EMERGENCY MEDICINE | Admitting: HOSPITALIST
Payer: MEDICARE

## 2019-03-27 ENCOUNTER — APPOINTMENT (OUTPATIENT)
Dept: MRI IMAGING | Facility: HOSPITAL | Age: 79
End: 2019-03-27
Attending: HOSPITALIST
Payer: MEDICARE

## 2019-03-27 ENCOUNTER — APPOINTMENT (OUTPATIENT)
Dept: GENERAL RADIOLOGY | Facility: HOSPITAL | Age: 79
End: 2019-03-27
Attending: EMERGENCY MEDICINE
Payer: MEDICARE

## 2019-03-27 DIAGNOSIS — I63.9 ACUTE CVA (CEREBROVASCULAR ACCIDENT) (HCC): Primary | ICD-10-CM

## 2019-03-27 PROCEDURE — 99220 INITIAL OBSERVATION CARE,LEVL III: CPT | Performed by: HOSPITALIST

## 2019-03-27 PROCEDURE — 99223 1ST HOSP IP/OBS HIGH 75: CPT | Performed by: OTHER

## 2019-03-27 PROCEDURE — 70551 MRI BRAIN STEM W/O DYE: CPT | Performed by: HOSPITALIST

## 2019-03-27 PROCEDURE — 71045 X-RAY EXAM CHEST 1 VIEW: CPT | Performed by: EMERGENCY MEDICINE

## 2019-03-27 PROCEDURE — 70450 CT HEAD/BRAIN W/O DYE: CPT | Performed by: EMERGENCY MEDICINE

## 2019-03-27 RX ORDER — ALENDRONATE SODIUM 70 MG/1
70 TABLET ORAL WEEKLY
Status: DISCONTINUED | OUTPATIENT
Start: 2019-03-27 | End: 2019-03-29

## 2019-03-27 RX ORDER — ONDANSETRON 2 MG/ML
4 INJECTION INTRAMUSCULAR; INTRAVENOUS EVERY 6 HOURS PRN
Status: DISCONTINUED | OUTPATIENT
Start: 2019-03-27 | End: 2019-03-27

## 2019-03-27 RX ORDER — METOPROLOL TARTRATE 50 MG/1
50 TABLET, FILM COATED ORAL
Status: DISCONTINUED | OUTPATIENT
Start: 2019-03-27 | End: 2019-03-29

## 2019-03-27 RX ORDER — SODIUM PHOSPHATE, DIBASIC AND SODIUM PHOSPHATE, MONOBASIC 7; 19 G/133ML; G/133ML
1 ENEMA RECTAL ONCE AS NEEDED
Status: DISCONTINUED | OUTPATIENT
Start: 2019-03-27 | End: 2019-03-29

## 2019-03-27 RX ORDER — AMLODIPINE BESYLATE 5 MG/1
5 TABLET ORAL NIGHTLY
Status: DISCONTINUED | OUTPATIENT
Start: 2019-03-27 | End: 2019-03-29

## 2019-03-27 RX ORDER — PAROXETINE 10 MG/1
10 TABLET, FILM COATED ORAL
Status: DISCONTINUED | OUTPATIENT
Start: 2019-03-27 | End: 2019-03-29

## 2019-03-27 RX ORDER — SENNOSIDES 8.6 MG
8.6 TABLET ORAL 2 TIMES DAILY
Status: DISCONTINUED | OUTPATIENT
Start: 2019-03-27 | End: 2019-03-29

## 2019-03-27 RX ORDER — ATORVASTATIN CALCIUM 40 MG/1
80 TABLET, FILM COATED ORAL NIGHTLY
Status: DISCONTINUED | OUTPATIENT
Start: 2019-03-27 | End: 2019-03-29

## 2019-03-27 RX ORDER — LOSARTAN POTASSIUM 50 MG/1
50 TABLET ORAL 2 TIMES DAILY
Status: DISCONTINUED | OUTPATIENT
Start: 2019-03-27 | End: 2019-03-29

## 2019-03-27 RX ORDER — BISACODYL 10 MG
10 SUPPOSITORY, RECTAL RECTAL
Status: DISCONTINUED | OUTPATIENT
Start: 2019-03-27 | End: 2019-03-29

## 2019-03-27 RX ORDER — SENNOSIDES 8.6 MG
17.2 TABLET ORAL NIGHTLY
Status: DISCONTINUED | OUTPATIENT
Start: 2019-03-27 | End: 2019-03-29

## 2019-03-27 RX ORDER — ACETAMINOPHEN 650 MG/1
650 SUPPOSITORY RECTAL EVERY 4 HOURS PRN
Status: DISCONTINUED | OUTPATIENT
Start: 2019-03-27 | End: 2019-03-29

## 2019-03-27 RX ORDER — FAMOTIDINE 20 MG/1
20 TABLET ORAL
Status: DISCONTINUED | OUTPATIENT
Start: 2019-03-27 | End: 2019-03-29

## 2019-03-27 RX ORDER — ACETAMINOPHEN 325 MG/1
650 TABLET ORAL EVERY 4 HOURS PRN
Status: DISCONTINUED | OUTPATIENT
Start: 2019-03-27 | End: 2019-03-29

## 2019-03-27 RX ORDER — DOCUSATE SODIUM 100 MG/1
100 CAPSULE, LIQUID FILLED ORAL 2 TIMES DAILY
Status: DISCONTINUED | OUTPATIENT
Start: 2019-03-27 | End: 2019-03-29

## 2019-03-27 RX ORDER — ACETAMINOPHEN 325 MG/1
650 TABLET ORAL EVERY 6 HOURS PRN
Status: DISCONTINUED | OUTPATIENT
Start: 2019-03-27 | End: 2019-03-27

## 2019-03-27 RX ORDER — ASPIRIN 325 MG
325 TABLET ORAL DAILY
Status: DISCONTINUED | OUTPATIENT
Start: 2019-03-27 | End: 2019-03-29

## 2019-03-27 RX ORDER — ONDANSETRON 2 MG/ML
4 INJECTION INTRAMUSCULAR; INTRAVENOUS EVERY 6 HOURS PRN
Status: DISCONTINUED | OUTPATIENT
Start: 2019-03-27 | End: 2019-03-29

## 2019-03-27 RX ORDER — METOPROLOL TARTRATE 50 MG/1
50 TABLET, FILM COATED ORAL
Status: DISCONTINUED | OUTPATIENT
Start: 2019-03-27 | End: 2019-03-27

## 2019-03-27 RX ORDER — DIPHENOXYLATE HYDROCHLORIDE AND ATROPINE SULFATE 2.5; .025 MG/1; MG/1
1 TABLET ORAL DAILY
Status: DISCONTINUED | OUTPATIENT
Start: 2019-03-27 | End: 2019-03-29

## 2019-03-27 RX ORDER — SODIUM CHLORIDE 0.9 % (FLUSH) 0.9 %
3 SYRINGE (ML) INJECTION AS NEEDED
Status: DISCONTINUED | OUTPATIENT
Start: 2019-03-27 | End: 2019-03-29

## 2019-03-27 RX ORDER — METOCLOPRAMIDE HYDROCHLORIDE 5 MG/ML
10 INJECTION INTRAMUSCULAR; INTRAVENOUS EVERY 8 HOURS PRN
Status: DISCONTINUED | OUTPATIENT
Start: 2019-03-27 | End: 2019-03-29

## 2019-03-27 RX ORDER — FLUTICASONE PROPIONATE 50 MCG
2 SPRAY, SUSPENSION (ML) NASAL DAILY
Status: DISCONTINUED | OUTPATIENT
Start: 2019-03-27 | End: 2019-03-29

## 2019-03-27 RX ORDER — LABETALOL HYDROCHLORIDE 5 MG/ML
10 INJECTION, SOLUTION INTRAVENOUS EVERY 10 MIN PRN
Status: DISCONTINUED | OUTPATIENT
Start: 2019-03-27 | End: 2019-03-29

## 2019-03-27 RX ORDER — POLYETHYLENE GLYCOL 3350 17 G/17G
17 POWDER, FOR SOLUTION ORAL DAILY PRN
Status: DISCONTINUED | OUTPATIENT
Start: 2019-03-27 | End: 2019-03-29

## 2019-03-27 NOTE — H&P
[facility]    PATIENT'S NAME: Ailyn Manriquez   ATTENDING PHYSICIAN: Gilbert White MD   PATIENT ACCOUNT#:   [id number]    LOCATION:  [place] [address]  MEDICAL RECORD #:   [id number]       YOB: 1940  ADMISSION DATE:       03/27/201 more slurred. No acute or increased weakness of her upper or lower extremities. She does have chronic right very mild hemiparesis, which has improved since her stroke in November 2018. Other 12-point review of systems negative.         PHYSICAL EXAMINATI reinterrogated. Further recommendations to follow.      Dictated By Vinny Thornton MD  d: 03/27/2019 13:40:31  t: 03/27/2019 13:46:54  [facility] [id number]  /

## 2019-03-27 NOTE — PAYOR COMM NOTE
[facility]  OBSERVATION    Place in observation Once (Order #[id number]) on 3/28/19  --------------  ADMISSION REVIEW     Payor: [address] [place], [place] [address] #:  [id number]  Authorization Number: [id number]

## 2019-03-27 NOTE — ED INITIAL ASSESSMENT (HPI)
Patient brought in by son for slurred speech.  Unknown what time it started, but caregiver noticed this AM.     Patient feels her tongue is \"over to the right\"

## 2019-03-27 NOTE — ED PROVIDER NOTES
Patient Seen in: [facility] Emergency Department    History   Patient presents with:  Stroke (neurologic)    Stated Complaint: Slurred Speech, dizziness    HPI    Patient is a 71-year-old female who is brought to the ER by her son.   They state catherine Slurred Speech, dizziness  Other systems are as noted in HPI. Constitutional and vital signs reviewed. All other systems reviewed and negative except as noted above.     Physical Exam     ED Triage Vitals   BP 03/27/19 1112 (!) 167/74   Pulse 03/27/19 following components:       Result Value    Leukocyte Esterase Urine Moderate (*)     Ascorbic Acid Urine 20  (*)     WBC Urine 6 (*)     Bacteria Urine Moderate (*)     All other components within normal limits   POCT GLUCOSE - Abnormal; Notable for the f (CST): Soni Leon MD on 3/27/2019 at 12:49          Ct Stroke Brain (no Iv)(cpt=70450)    Result Date: 3/27/2019  CONCLUSION:   No acute intracranial abnormality by noncontrast CT.   Cerebral atrophy with nonspecific white matter changes most likely

## 2019-03-27 NOTE — CM/SW NOTE
SW received an MDO regarding assessing for [facility] needs and social support. Pt was out of her room for testing therefore SW spoke with her dtr Darien Nice [phone]. Pt lives in house with 5 external stairs to enter.  Pt is currently living with her daughter Darien Nice

## 2019-03-27 NOTE — CONSULTS
Neurology Inpatient Consult Note    Lore Guallpa : 5/10/1940   Referring Physician: Dr. Morris Ybarra  HPI:     Lore Guallpa is a 69year old female who is being seen in neurologic evaluation.     Patient being seen in evaluation for slurred speech, sensat - OS - LEFT EYE Left 01/24/2018    [id number]      Family History   Problem Relation Age of Onset   • Diabetes Other    • Hypertension Other    • Lipids Other         hyperlipidemia   • Glaucoma Neg    • Macular degeneration Neg       Social History:  Social Hist and tone; no drift  Sensory: intact to light touch and pinprick throughout  Reflexes: DTRs 2+ in bilateral biceps, brachioradialis, patella  Coordination / gait: no finger-nose-finger dysmetria, gait testing deferred    IMAGING / STUDIES / LABS:  reviewed 2018, images and report reviewed  CONCLUSION:       1. There are scattered punctate and patchy foci of restricted diffusion in the left frontal lobe, left parietal lobe, and left occipital lobe compatible with areas of acute ischemia/infarction.        2. M

## 2019-03-28 PROCEDURE — 99226 SUBSEQUENT OBSERVATION CARE: CPT | Performed by: HOSPITALIST

## 2019-03-28 PROCEDURE — 99232 SBSQ HOSP IP/OBS MODERATE 35: CPT | Performed by: OTHER

## 2019-03-28 RX ORDER — POTASSIUM CHLORIDE 20 MEQ/1
40 TABLET, EXTENDED RELEASE ORAL EVERY 4 HOURS
Status: COMPLETED | OUTPATIENT
Start: 2019-03-28 | End: 2019-03-28

## 2019-03-28 RX ORDER — SODIUM CHLORIDE 9 MG/ML
INJECTION, SOLUTION INTRAVENOUS
Status: COMPLETED
Start: 2019-03-28 | End: 2019-03-28

## 2019-03-28 NOTE — PROGRESS NOTES
Neurology Inpatient Follow-up Note      HPI:     Pt being seen in follow up. Visit conducted w/ help of  (language line). She is feeling better.       Past Medical Hisotory:  Reviewed    Medications:  Reviewed    Allergies:    Yari Mckee brain reviewed     –PT/OT/speech     –Continue statin, LDL at goal less than 70; continue aspirin for time being    –Patient should follow up w/ her outpatient neurologist Dr. Anu Chung in the office      No further inpatient recommendations.   Please page

## 2019-03-28 NOTE — SLP NOTE
SPEECH/LANGUAGE/COGNITIVE EVALUATION - INPATIENT    Admission Date: 3/27/2019  Evaluation Date: 03/28/19    Reason for Referral: Stroke protocol    ASSESSMENT & PLAN   ASSESSMENT & IMPRESSION  Language line use for Greenlandic interpretation  Telephonic interp informed and has taken part in this evaluation and plan of treatment and have been advised and agree on the findings and goals.       FOLLOW UP  Treatment Plan/Recommendations: No further inpatient SLP service warranted;Dysphagia therapy(for cognition)    T

## 2019-03-28 NOTE — OCCUPATIONAL THERAPY NOTE
OCCUPATIONAL THERAPY EVALUATION - INPATIENT     Room Number: 336/336-A  Evaluation Date: 3/28/2019  Type of Evaluation: Initial  Presenting Problem: (difficulty speaking)    Physician Order: IP Consult to Occupational Therapy  Reason for Therapy: ADL/IAD accident) [facility])    Past Medical History  Past Medical History:   Diagnosis Date   • Age-related nuclear cataract of both eyes 11/12/2015   • Anxiety state, unspecified    • CVA (cerebral vascular accident) [facility]) Jan 2015    minor   • Headache    • High blo deficits noted    SENSATION  Intact lt touch    Communication: soft spoken but intelligible    Behavioral/Emotional/Social: appropriate    RANGE OF MOTION   Upper extremity ROM is within functional limits     STRENGTH ASSESSMENT  Upper extremity strength i

## 2019-03-28 NOTE — SLP NOTE
ADULT SWALLOWING EVALUATION    ASSESSMENT    ASSESSMENT/OVERALL IMPRESSION:    Language line used for session:  Telephonic : Glenn Alfaro  ID number: [id number]    Patient known to this service from recent course of OP therapy.   Patient previously on pu Strategies Recommended: No straws; Slow rate;Small bites and sips  Aspiration Precautions: Upright position; Slow rate;Small bites and sips; No straw  Medication Administration Recommendations: Crushed in puree  Treatment Plan/Recommendations: Dysphagia thera Propulsion: Intact  Mastication: Impaired  Retention: Intact    Pharyngeal Phase of Swallow: Within Functional Limits      (Please note: Silent aspiration cannot be evaluated clinically.  Videofluoroscopic Swallow Study is required to rule-out silent aspira

## 2019-03-28 NOTE — PROGRESS NOTES
[facility]    Progress Note    Chino Jackson Patient Status:  Observation    5/10/1940 MRN [id number]   Location [facility] 3W/SW Attending Jeb Santacruz MD   Hosp Day # 1 PCP Rebecca Patel MD       Subjective:   Chino Jackson Propionate  2 spray Each Nare Daily   • Losartan Potassium  50 mg Oral BID   • THERA/BETA-CAROTENE  1 tablet Oral Daily   • PARoxetine HCl  10 mg Oral Daily   • Senna  8.6 mg Per G Tube BID   • Metoprolol Tartrate  50 mg Oral 2x Daily(Beta Blocker)       C subacute infarct. Chronic microvascular white matter ischemic change within the cerebrum as well as a more advanced localized region of subcortical infarct in the lateral aspect of the posterior left frontal lobe.    Dictated by (CST): Jas Rowell MD on 3 workup  Susana Barrera MD

## 2019-03-28 NOTE — PHYSICAL THERAPY NOTE
PHYSICAL THERAPY QUICK EVALUATION - INPATIENT    Room Number: 336/336-A  Evaluation Date: 3/28/2019  Presenting Problem: slurred speech  Physician Order: PT Eval and Treat    Problem List  Principal Problem:    Acute CVA (cerebrovascular accident) ([facility] 75.) AND STRENGTH ASSESSMENT    Pt has 4/5 strength grossly in BLE. It was difficult to test MMT due to difficulty understanding directions with language barrier even with use of .        AM-PAC '6-Clicks' INPATIENT SHORT FORM - BASIC MOBILITY  How mu w/o AD w/ supervision. Pt negotiated up/down 12 stairs w/ HR & SBA. At the end of the session, pt was left sitting in recliner chair w/ chair alarm on. Pt reports that slurred speech is the only issue she is having right now.  No new functional deficits no

## 2019-03-29 VITALS
HEART RATE: 58 BPM | SYSTOLIC BLOOD PRESSURE: 123 MMHG | OXYGEN SATURATION: 97 % | RESPIRATION RATE: 14 BRPM | BODY MASS INDEX: 22 KG/M2 | TEMPERATURE: 98 F | WEIGHT: 103.31 LBS | DIASTOLIC BLOOD PRESSURE: 60 MMHG

## 2019-03-29 PROCEDURE — 99217 OBSERVATION CARE DISCHARGE: CPT | Performed by: HOSPITALIST

## 2019-03-29 RX ORDER — CEPHALEXIN 250 MG/5ML
250 POWDER, FOR SUSPENSION ORAL 4 TIMES DAILY
Qty: 100 ML | Refills: 0 | Status: SHIPPED | OUTPATIENT
Start: 2019-03-29 | End: 2019-04-03

## 2019-03-29 NOTE — DISCHARGE SUMMARY
[facility]    Discharge Summary    Lani Fabian Patient Status:  Observation    5/10/1940 MRN [id number]   Location [facility] 3W/SW Attending Sriram Boles MD   Hosp Day # 1 PCP Rebecca Patel MD     Date of Admission: 3/2 Cefazolin <=4 Sensitive      Ciprofloxacin <=0.25 Sensitive      Gentamicin <=1 Sensitive      Meropenem <=0.25 Sensitive      Levofloxacin <=0.12 Sensitive      Nitrofurantoin 32 Sensitive      Piperacillin + Tazobactam <=4 Sensitive      Trimethoprim/Sul Approved by (CST): Tanya Degroot MD on 3/27/2019 at 11:30            LABS :     Lab Results   Component Value Date    WBC 4.8 03/29/2019    HGB 12.7 03/29/2019    HCT 37.0 03/29/2019    .0 (L) 03/29/2019    CREATSERUM 0.83 03/29/2019    BUN 11 0 Quantity:  12 tablet  Refills:  0     amLODIPine Besylate 5 MG Tabs  Commonly known as:  NORVASC      1 tablet (5 mg total) by Per G Tube route nightly.    Quantity:  90 tablet  Refills:  0     aspirin 325 MG Tabs      1 tablet (325 mg total) by Per G Tube [phone], [phone]  [facility]    Phone:  [phone]   · cephALEXin 250 MG/5ML Susr         Follow up Visits  No follow-up provider specified.     Consultants     Provider Role Specialty    Sally Rodríguez MD [facility]

## 2019-03-29 NOTE — PLAN OF CARE
Problem: Patient Centered Care  Goal: Patient preferences are identified and integrated in the patient's plan of care  Interventions:  - What would you like us to know as we care for you?  C[place] Rican speaking  - Provide timely, complete, and accurate informati assistive/communication devices  Outcome: Progressing      Problem: PAIN - ADULT  Goal: Verbalizes/displays adequate comfort level or patient's stated pain goal  INTERVENTIONS:  - Encourage pt to monitor pain and request assistance  - Assess pain using amber using call light when going to the bathroom. G-tube dressing intact. Pt able to swallow meds and drink water without any difficulties. Will continue to monitor pt.

## 2019-03-29 NOTE — CM/SW NOTE
RN informed BLESSING that pt is medically cleared for discharge. BLESSING notified the [facility] liaison jun [phone]  to start services and sent order.     Troy Caldwell  [id number]

## 2019-03-29 NOTE — PLAN OF CARE
Problem: Patient Centered Care  Goal: Patient preferences are identified and integrated in the patient's plan of care  Interventions:  - What would you like us to know as we care for you?  Bangladeshi speaking  - Provide timely, complete, and accurate informati impaired and aphasic patients to use assistive/communication devices  Outcome: Adequate for Discharge      Problem: PAIN - ADULT  Goal: Verbalizes/displays adequate comfort level or patient's stated pain goal  INTERVENTIONS:  - Encourage pt to monitor pain preferences of patient/family/discharge partner  - Complete POLST form as appropriate  - Assess patient's ability to be responsible for managing their own health  - Refer to Case Management Department for coordinating discharge planning if the patient need

## 2019-04-01 ENCOUNTER — PATIENT OUTREACH (OUTPATIENT)
Dept: CASE MANAGEMENT | Age: 79
End: 2019-04-01

## 2019-04-01 ENCOUNTER — TELEPHONE (OUTPATIENT)
Dept: FAMILY MEDICINE CLINIC | Facility: CLINIC | Age: 79
End: 2019-04-01

## 2019-04-01 DIAGNOSIS — Z02.9 ENCOUNTERS FOR UNSPECIFIED ADMINISTRATIVE PURPOSE: ICD-10-CM

## 2019-04-01 DIAGNOSIS — R47.9 DIFFICULTY WITH SPEECH: ICD-10-CM

## 2019-04-01 PROCEDURE — 1111F DSCHRG MED/CURRENT MED MERGE: CPT

## 2019-04-01 NOTE — PROGRESS NOTES
Initial Post Discharge Follow Up   Discharge Date: 3/29/19  Contact Date: 4/1/2019    Consent Verification:  Assessment Completed With: Other: Ami Fears- Dtr Permission received per patient?  written  HIPAA Verified?   Yes    Discharge Dx:      Possible TIA diet?   yes      Medications: Reviewed medication list with the patient's dtr. Medications are up to date. Current Outpatient Medications:  cephALEXin 250 MG/5ML Oral Recon Susp Take 5 mL (250 mg total) by mouth 4 (four) times daily for 5 days.  Disp: 1 medication:   o Was the new medication’s purpose explained? yes  o Was the new medication’s side effects discussed? yes  o Do you have any questions about your new medication?  No  • Did you  your discharge medications when you left the hospital? Yes 15 minutes prior to your scheduled appointment. Please also bring your Insurance card, Photo ID, and your medication bottles or a list of your current medication.   If your condition improves and this appointment is no longer needed, please contact your phy

## 2019-04-01 NOTE — TELEPHONE ENCOUNTER
Pt was contacted for TCM, spoke to dtr, Elly Herbert, per Brenna. An appt was scheduled for the pt for TCM/HFU on 4/8/19 however TCM/HFU is recommended by 4/5/19 as pt is a high risk for readmission.  Attempted to schedule a sooner appt for the pt but was not able

## 2019-04-02 ENCOUNTER — TELEPHONE (OUTPATIENT)
Dept: NEUROLOGY | Facility: CLINIC | Age: 79
End: 2019-04-02

## 2019-04-03 NOTE — PROGRESS NOTES
[facility] - Gastroenterology                                                                                                  Clinic Progress Note    Patient pr Dean Diehl MD at [facility] ENDOSCOPY   • ESOPHAGOGASTRODUODENOSCOPY (EGD) N/A 11/20/2018    Performed by Sherif Mahan MD at [address] [place] [address]   • YAG CAPSULOTOMY - OD - RIGHT EYE Right 01/17/2018    LILA   • YAG CAPSULOTOMY - OS - L total) by mouth once daily. Disp: 90 tablet Rfl: 0   Senna 8.6 MG Oral Tab 1 tablet (8.6 mg total) by Per G Tube route 2 (two) times daily. Disp: 60 tablet Rfl: 3   Multiple Vitamin (MULTI-DAY VITAMINS OR) Take 1 tablet by mouth daily.  Disp:  Rfl:    Fluti and plan for dysphagia therapy.      I discussed with the patient and her daughters that I would like to discuss with her neurologist Dr. Neymar Maria first and likely a speech therapist prior to removal as removal is easy but replacement is a much bigger proc

## 2019-04-03 NOTE — TELEPHONE ENCOUNTER
Please schedule the patient for the follow-up so we can discuss the findings of neuropsychological testing.

## 2019-04-04 ENCOUNTER — TELEPHONE (OUTPATIENT)
Dept: GASTROENTEROLOGY | Facility: CLINIC | Age: 79
End: 2019-04-04

## 2019-04-04 ENCOUNTER — OFFICE VISIT (OUTPATIENT)
Dept: GASTROENTEROLOGY | Facility: CLINIC | Age: 79
End: 2019-04-04
Payer: COMMERCIAL

## 2019-04-04 VITALS
HEART RATE: 52 BPM | BODY MASS INDEX: 22.92 KG/M2 | WEIGHT: 109.19 LBS | SYSTOLIC BLOOD PRESSURE: 105 MMHG | DIASTOLIC BLOOD PRESSURE: 54 MMHG | HEIGHT: 58 IN

## 2019-04-04 DIAGNOSIS — Z09 S/P GASTROSTOMY TUBE (G TUBE) PLACEMENT, FOLLOW-UP EXAM: Primary | ICD-10-CM

## 2019-04-04 PROCEDURE — 99213 OFFICE O/P EST LOW 20 MIN: CPT | Performed by: INTERNAL MEDICINE

## 2019-04-04 NOTE — TELEPHONE ENCOUNTER
Spoke to pt daughter, Willy loya (OK per pt consent) and reviewed Dr. Avery Parsons message below, she verbalized understanding of all and will schedule f/u with speech therapist to get the final OK to schedule G-tube removal in office.  She will c/b to inform once this is

## 2019-04-04 NOTE — TELEPHONE ENCOUNTER
GI Staff:    Please contact the patient's daughter Elizabet Caballero and let her know I did speak with Dr. Lisy Olivera and he thinks there are no contraindications at this time neurologically for removal but I would like her to follow up with the speech therapist once t

## 2019-04-05 ENCOUNTER — PATIENT MESSAGE (OUTPATIENT)
Dept: GASTROENTEROLOGY | Facility: CLINIC | Age: 79
End: 2019-04-05

## 2019-04-05 NOTE — TELEPHONE ENCOUNTER
From: Lore Guallpa  To: Zoya Cheng MD  Sent: 4/5/2019 9:29 AM CDT  Subject: Russ Holt,  I just wanted to bring to your attention that I was misinformed by your office about my mom's g-tube removal. I have attached the response f

## 2019-04-05 NOTE — TELEPHONE ENCOUNTER
Spoke to pt daughter, Sylvia Davis (OK per ARLETTE) and reviewed that the G-tube was not pulled during OV on 4/4/19 b/c pt needed neurology and speech clearance per our conversation from yesterday.  I also reviewed that once clearance is received it would be preferabl

## 2019-04-05 NOTE — PROGRESS NOTES
Called and spoke with patient's daughter Perri Strauss to discuss my discussion with neurology and again reservations about not pulling the tube out for her mother yesterday due to the new recent events in the last few weeks.     Appreciative of call and no concern

## 2019-04-08 ENCOUNTER — OFFICE VISIT (OUTPATIENT)
Dept: FAMILY MEDICINE CLINIC | Facility: CLINIC | Age: 79
End: 2019-04-08
Payer: COMMERCIAL

## 2019-04-08 VITALS
BODY MASS INDEX: 22.88 KG/M2 | HEIGHT: 58 IN | SYSTOLIC BLOOD PRESSURE: 146 MMHG | WEIGHT: 109 LBS | HEART RATE: 50 BPM | DIASTOLIC BLOOD PRESSURE: 60 MMHG

## 2019-04-08 DIAGNOSIS — R29.898 WEAKNESS OF RIGHT UPPER EXTREMITY: ICD-10-CM

## 2019-04-08 DIAGNOSIS — Z86.73 HISTORY OF CVA (CEREBROVASCULAR ACCIDENT): Primary | ICD-10-CM

## 2019-04-08 DIAGNOSIS — I10 ESSENTIAL HYPERTENSION: ICD-10-CM

## 2019-04-08 DIAGNOSIS — Z91.81 RISK FOR FALLS: ICD-10-CM

## 2019-04-08 DIAGNOSIS — D69.6 THROMBOCYTOPENIA (HCC): ICD-10-CM

## 2019-04-08 DIAGNOSIS — N39.0 URINARY TRACT INFECTION WITHOUT HEMATURIA, SITE UNSPECIFIED: ICD-10-CM

## 2019-04-08 PROCEDURE — 99495 TRANSJ CARE MGMT MOD F2F 14D: CPT | Performed by: FAMILY MEDICINE

## 2019-04-08 RX ORDER — LANCETS 33 GAUGE
EACH MISCELLANEOUS
COMMUNITY
Start: 2019-03-18

## 2019-04-08 NOTE — PROGRESS NOTES
HPI:    Michelle Ba is a 69year old female here today for hospital follow up.    She was discharged [facility]  to Home   Admission Date: 3/27/19   Discharge Date: 3/29/19  Hospital Discharge Diagnoses (since 3/9/2019) subacute to chronic phenomena since November 2015.  Today, she was brought in because of increased slurred speech and sensation of tongue tingling and numbness, heaviness in her tongue.  CBC, chemistry, troponin, CT scan of the brain, and EKG were [facility] (VITAMIN D3) 3000 units Oral Tab Take 3,000 Units by mouth daily. Lancets Misc. Does not apply Misc TEST BID UTD     No current facility-administered medications on file prior to visit.        HISTORY: reconciled and review with patient  She  has a past encounter: 109 lb (49.4 kg). /60   Pulse 50   Ht 4' 10\" (1.473 m)   Wt 109 lb (49.4 kg)   BMI 22.78 kg/m²   Physical Exam    Constitutional: She appears well-developed. Cardiovascular: Normal rate and regular rhythm.     Pulmonary/Chest: Effort n

## 2019-04-09 NOTE — PROGRESS NOTES
Yes please and thanks    Aleena La MD  [facility] - Gastroenterology  4/8/2019  7:13 PM

## 2019-04-10 ENCOUNTER — APPOINTMENT (OUTPATIENT)
Dept: SPEECH THERAPY | Facility: HOSPITAL | Age: 79
End: 2019-04-10
Attending: FAMILY MEDICINE
Payer: MEDICARE

## 2019-04-19 ENCOUNTER — TELEPHONE (OUTPATIENT)
Dept: FAMILY MEDICINE CLINIC | Facility: CLINIC | Age: 79
End: 2019-04-19

## 2019-04-19 NOTE — TELEPHONE ENCOUNTER
Patients son states that Mother has stroke in November and he had to cancel trip out of country so to recoop cost form needs to be filled out. Nicole Travis

## 2019-04-25 RX ORDER — ALENDRONATE SODIUM 70 MG/1
TABLET ORAL
Qty: 12 TABLET | Refills: 0 | OUTPATIENT
Start: 2019-04-25

## 2019-04-28 RX ORDER — PAROXETINE 10 MG/1
TABLET, FILM COATED ORAL
Qty: 90 TABLET | Refills: 1 | Status: SHIPPED | OUTPATIENT
Start: 2019-04-28 | End: 2019-06-04

## 2019-04-28 NOTE — TELEPHONE ENCOUNTER
Refill passed per [facility] protocol.   Refill Protocol Appointment Criteria  · Appointment scheduled in the past 6 months or in the next 3 months  Recent Outpatient Visits            1 week ago History of CVA (cerebrovascular accident)    [address] [facility]

## 2019-04-29 RX ORDER — AMLODIPINE BESYLATE 5 MG/1
TABLET ORAL
Qty: 90 TABLET | Refills: 1 | Status: SHIPPED | OUTPATIENT
Start: 2019-04-29 | End: 2019-09-12

## 2019-04-29 RX ORDER — ATORVASTATIN CALCIUM 80 MG/1
TABLET, FILM COATED ORAL
Qty: 90 TABLET | Refills: 1 | Status: SHIPPED | OUTPATIENT
Start: 2019-04-29 | End: 2019-09-12

## 2019-04-29 NOTE — TELEPHONE ENCOUNTER
Refill passed per [facility], [facility] protocol.   Hypertensive Medications  Protocol Criteria:  · Appointment scheduled in the past 6 months or in the next 3 months  · BMP or CMP in the past 12 months  · Creatinine result < 2  Recent Outpatient Visits Daisy Elder MD    Office Visit    3 weeks ago S/P gastrostomy tube (G tube) placement, follow-up exam    Dana Torres, Stalin Hines MD    Office Visit    1 month ago History of CVA (cerebrovascula

## 2019-04-30 RX ORDER — LOSARTAN POTASSIUM 50 MG/1
TABLET ORAL
Qty: 180 TABLET | Refills: 1 | Status: SHIPPED | OUTPATIENT
Start: 2019-04-30 | End: 2019-09-12

## 2019-06-01 ENCOUNTER — OFFICE VISIT (OUTPATIENT)
Dept: FAMILY MEDICINE CLINIC | Facility: CLINIC | Age: 79
End: 2019-06-01
Payer: COMMERCIAL

## 2019-06-01 VITALS
TEMPERATURE: 97 F | DIASTOLIC BLOOD PRESSURE: 63 MMHG | WEIGHT: 113 LBS | HEIGHT: 58 IN | BODY MASS INDEX: 23.72 KG/M2 | SYSTOLIC BLOOD PRESSURE: 130 MMHG | HEART RATE: 66 BPM

## 2019-06-01 DIAGNOSIS — I10 ESSENTIAL HYPERTENSION: ICD-10-CM

## 2019-06-01 DIAGNOSIS — N39.498 OTHER URINARY INCONTINENCE: ICD-10-CM

## 2019-06-01 DIAGNOSIS — Z93.4 GASTROJEJUNOSTOMY TUBE STATUS (HCC): ICD-10-CM

## 2019-06-01 DIAGNOSIS — Z86.73 HISTORY OF CVA (CEREBROVASCULAR ACCIDENT): ICD-10-CM

## 2019-06-01 DIAGNOSIS — R35.0 URINARY FREQUENCY: Primary | ICD-10-CM

## 2019-06-01 PROCEDURE — 81002 URINALYSIS NONAUTO W/O SCOPE: CPT | Performed by: FAMILY MEDICINE

## 2019-06-01 PROCEDURE — 99214 OFFICE O/P EST MOD 30 MIN: CPT | Performed by: FAMILY MEDICINE

## 2019-06-01 PROCEDURE — G0463 HOSPITAL OUTPT CLINIC VISIT: HCPCS | Performed by: FAMILY MEDICINE

## 2019-06-01 RX ORDER — CIPROFLOXACIN 250 MG/1
250 TABLET, FILM COATED ORAL 2 TIMES DAILY
Qty: 14 TABLET | Refills: 0 | Status: SHIPPED | OUTPATIENT
Start: 2019-06-01 | End: 2019-06-08

## 2019-06-01 NOTE — PROGRESS NOTES
HPI:    Patient ID: Chino Jackson is a 67year old female. HPI  Patient presents with:   Other: urinary incontence pt states she had a nurse coming to her house and she stated she had a blood clot     Had a home health nurse visit home    Notes per nurse Lancets Misc. Does not apply Misc TEST BID UTD Disp: 200 lancet Rfl: 0   alendronate 70 MG Oral Tab Take 1 tablet (70 mg total) by mouth once a week.  Disp: 12 tablet Rfl: 0   famoTIDine 20 MG Oral Tab TAKE 1 TABLET VIA GTUBE EVERY DAY AS NEEDED Disp: 30 directed  - URINALYSIS NONAUTO W/O SCOPE  - URINE CULTURE, ROUTINE; Future    2. Other urinary incontinence    - URINALYSIS NONAUTO W/O SCOPE  - URINE CULTURE, ROUTINE; Future    3. Essential hypertension  Controlled      4.  Gastrojejunostomy tube status (

## 2019-06-04 ENCOUNTER — OFFICE VISIT (OUTPATIENT)
Dept: NEUROLOGY | Facility: CLINIC | Age: 79
End: 2019-06-04
Payer: COMMERCIAL

## 2019-06-04 VITALS
HEART RATE: 60 BPM | BODY MASS INDEX: 22.78 KG/M2 | WEIGHT: 113 LBS | SYSTOLIC BLOOD PRESSURE: 110 MMHG | DIASTOLIC BLOOD PRESSURE: 62 MMHG | HEIGHT: 59 IN

## 2019-06-04 DIAGNOSIS — I63.10 CEREBROVASCULAR ACCIDENT (CVA) DUE TO EMBOLISM OF PRECEREBRAL ARTERY (HCC): Primary | ICD-10-CM

## 2019-06-04 PROCEDURE — 99213 OFFICE O/P EST LOW 20 MIN: CPT | Performed by: OTHER

## 2019-06-04 NOTE — PROGRESS NOTES
Neurology follow-up visit     Referred By:  ref. provider found    Chief Complaint: Patient presents with:  Memory Loss: LOV: 2/7/19. Patient presents today with daughter to review neuropsych testing that was completed.  Daughter states that patient accident) [facility]) Jan 2015    minor   • Headache    • High blood pressure    • Hypercholesteremia    • Hypertension    • Syncope 2009    2D echo ventriular wall thickening   • Type II or unspecified type diabetes mellitus without mention of complication, not apply Kit, Use as directed, Disp: 1 kit, Rfl: 0  •  Glucose Blood (BLOOD GLUCOSE TEST) In Vitro Strip, TEST BID UTD, Disp: 200 strip, Rfl: 0  •  Lancets Misc.  Does not apply Misc, TEST BID UTD, Disp: 200 lancet, Rfl: 0  •  alendronate 70 MG Oral Tab, Take her daughter. Cranial Nerves:  II.- Visual fields full to confrontation        Fundoscopically- No papilledema or retinal hemorrhages. Normal optic discs, sharp edges. III, IV, VI- EOM intact, DAVIDE  V.  Facial sensation decreased on the right side  VII remain static. Unless something changes and then the family will bring the patient back.   Otherwise treatment of risk factors such as diabetes, hypertension and hypercholesterolemia and continued with antiplatelet therapy       Education and counseling pr

## 2019-06-06 ENCOUNTER — MED REC SCAN ONLY (OUTPATIENT)
Dept: NEUROLOGY | Facility: CLINIC | Age: 79
End: 2019-06-06

## 2019-06-13 ENCOUNTER — TELEPHONE (OUTPATIENT)
Dept: FAMILY MEDICINE CLINIC | Facility: CLINIC | Age: 79
End: 2019-06-13

## 2019-06-25 RX ORDER — ALENDRONATE SODIUM 70 MG/1
TABLET ORAL
Qty: 12 TABLET | Refills: 1 | Status: SHIPPED | OUTPATIENT
Start: 2019-06-25 | End: 2019-10-11

## 2019-06-25 RX ORDER — FAMOTIDINE 20 MG/1
TABLET ORAL
Qty: 90 TABLET | Refills: 1 | Status: SHIPPED | OUTPATIENT
Start: 2019-06-25 | End: 2019-10-11

## 2019-06-25 RX ORDER — METOPROLOL TARTRATE 50 MG/1
TABLET, FILM COATED ORAL
Qty: 180 TABLET | Refills: 1 | Status: SHIPPED | OUTPATIENT
Start: 2019-06-25 | End: 2019-10-11

## 2019-06-26 NOTE — TELEPHONE ENCOUNTER
Refill passed per [facility] protocol.   Refill Protocol Appointment Criteria  · Appointment scheduled in the past 12 months or in the next 3 months  Recent Outpatient Visits            5 days ago History of CVA (cerebrovascular accident)    Gunzing 9 03/29/2019    ALKPHOS 76 01/09/2016    AST 21 07/01/2018    ALT 15 07/01/2018    BILT 0.8 07/01/2018    TP 6.8 07/01/2018    ALB 4.0 07/01/2018     03/29/2019    K 4.0 03/29/2019     (H) 03/29/2019    CO2 30.0 03/29/2019    GLOBULIN 2.8 07/01/2

## 2019-09-13 RX ORDER — LOSARTAN POTASSIUM 50 MG/1
TABLET ORAL
Qty: 180 TABLET | Refills: 1 | Status: SHIPPED | OUTPATIENT
Start: 2019-09-13 | End: 2020-03-20

## 2019-09-13 RX ORDER — AMLODIPINE BESYLATE 5 MG/1
TABLET ORAL
Qty: 90 TABLET | Refills: 1 | Status: SHIPPED | OUTPATIENT
Start: 2019-09-13 | End: 2020-01-17

## 2019-09-13 NOTE — TELEPHONE ENCOUNTER
Refill passed per [facility], [facility] protocol.     Hypertensive Medications  Protocol Criteria:  · Appointment scheduled in the past 6 months or in the next 3 months  · BMP or CMP in the past 12 months  · Creatinine result < 2  Recent Outpatient Visits

## 2019-09-13 NOTE — TELEPHONE ENCOUNTER
Please review; protocol failed.     Requested Prescriptions     Pending Prescriptions Disp Refills   • ATORVASTATIN 80 MG Oral Tab [Pharmacy Med Name: ATORVASTATIN  80MG  TAB] 90 tablet 1     Sig: TAKE 1 TABLET PER G TUBE  ROUTE NIGHTLY     Signed Prescript

## 2019-09-14 RX ORDER — ATORVASTATIN CALCIUM 80 MG/1
TABLET, FILM COATED ORAL
Qty: 90 TABLET | Refills: 1 | Status: SHIPPED | OUTPATIENT
Start: 2019-09-14 | End: 2020-01-17

## 2019-09-30 ENCOUNTER — HOSPITAL ENCOUNTER (EMERGENCY)
Facility: HOSPITAL | Age: 79
Discharge: HOME | End: 2019-09-30
Attending: EMERGENCY MEDICINE
Payer: MEDICARE

## 2019-09-30 VITALS
RESPIRATION RATE: 18 BRPM | BODY MASS INDEX: 25.19 KG/M2 | DIASTOLIC BLOOD PRESSURE: 52 MMHG | SYSTOLIC BLOOD PRESSURE: 138 MMHG | WEIGHT: 120 LBS | HEART RATE: 59 BPM | OXYGEN SATURATION: 99 % | HEIGHT: 58 IN | TEMPERATURE: 98 F

## 2019-09-30 DIAGNOSIS — L03.019 ONYCHIA AND PARONYCHIA OF FINGER: Primary | ICD-10-CM

## 2019-09-30 PROCEDURE — 10060 I&D ABSCESS SIMPLE/SINGLE: CPT

## 2019-09-30 PROCEDURE — 99283 EMERGENCY DEPT VISIT LOW MDM: CPT

## 2019-09-30 RX ORDER — IBUPROFEN 400 MG/1
400 TABLET ORAL ONCE
Status: DISCONTINUED | OUTPATIENT
Start: 2019-09-30 | End: 2019-09-30

## 2019-09-30 RX ORDER — CEPHALEXIN 500 MG/1
500 CAPSULE ORAL 3 TIMES DAILY
Qty: 15 CAPSULE | Refills: 0 | Status: SHIPPED | OUTPATIENT
Start: 2019-09-30 | End: 2019-10-05

## 2019-09-30 RX ORDER — ACETAMINOPHEN 500 MG
1000 TABLET ORAL ONCE
Status: COMPLETED | OUTPATIENT
Start: 2019-09-30 | End: 2019-09-30

## 2019-10-01 NOTE — ED NOTES
Pt states yesterday she pulled a hang nail from rt 5 digit. States today it is swollen and painful. Daughter states they soaked it in warm water and hydrogen perioxide and used neosporin. No results. States the finger continues to become more painful.

## 2019-10-01 NOTE — ED PROVIDER NOTES
Patient Seen in: [facility] AND [facility] Emergency Department      History   Patient presents with:  Finger Pain    Stated Complaint: sweliing on R hand    HPI    The patient is a 76-year-old female who presents with 2 to 3 days of pain swelling redness around Current:/51   Pulse 56   Temp 98 °F (36.7 °C)   Resp 18   Ht 147.3 cm (4' 10\")   Wt 54.4 kg   SpO2 98%   BMI 25.08 kg/m²         Physical Exam   Constitutional: She is oriented to person, place, and time.  She appears well-developed and well-nourishe

## 2019-10-05 ENCOUNTER — PATIENT MESSAGE (OUTPATIENT)
Dept: GASTROENTEROLOGY | Facility: CLINIC | Age: 79
End: 2019-10-05

## 2019-10-05 DIAGNOSIS — R13.12 OROPHARYNGEAL DYSPHAGIA: Primary | ICD-10-CM

## 2019-10-07 NOTE — PROGRESS NOTES
If she is improved and does not require G-tube then of course she is cleared from neurological perspective to remove it.

## 2019-10-07 NOTE — TELEPHONE ENCOUNTER
Dr. Talley Bars-    See pt message from 4/5/2019. Pt was to have neurology and speech clearance before G-tube removal. Pt has upcoming appt with you on 10/11/2019.      Please advise if speech therapy consult is to be ordered by PCP as it was previously 1/23/2019 by

## 2019-10-07 NOTE — TELEPHONE ENCOUNTER
Dr. Ivett Pedro,     Pt requesting G-tube removal during OV w/ Dr. Torin Greenberg on 10/11/2019. Please advise if the pt has neurology clearance to proceed with the removal, thank you.

## 2019-10-07 NOTE — TELEPHONE ENCOUNTER
From: Carmela Ascencio  To: Lj Braden MD  Sent: 10/5/2019 9:29 AM CDT  Subject: Visit Maribel Christiansen dr, my mom has an upcoming appointment and she would like to have the g tube removed .  Can you send her for a swallowing test before th

## 2019-10-08 NOTE — TELEPHONE ENCOUNTER
Left message for pt daughter, Ely Garcia (OK per ARLETTE) and reviewed that [employer] message was sent yesterday and has not been read. Need to ensure the pt sees the speech pathologist prior to OV on 10/11/2019 to be cleared for G-tube removal during OV.  Reviewed cl

## 2019-10-12 RX ORDER — METOPROLOL TARTRATE 50 MG/1
TABLET, FILM COATED ORAL
Qty: 180 TABLET | Refills: 1 | Status: SHIPPED | OUTPATIENT
Start: 2019-10-12 | End: 2020-04-09

## 2019-10-12 RX ORDER — ALENDRONATE SODIUM 70 MG/1
TABLET ORAL
Qty: 12 TABLET | Refills: 1 | Status: SHIPPED | OUTPATIENT
Start: 2019-10-12 | End: 2020-03-18

## 2019-10-12 RX ORDER — FAMOTIDINE 20 MG/1
TABLET ORAL
Qty: 90 TABLET | Refills: 1 | Status: SHIPPED | OUTPATIENT
Start: 2019-10-12 | End: 2020-04-09

## 2019-10-12 NOTE — TELEPHONE ENCOUNTER
Please advise regarding prescription   Refill passed per [facility] protocol.     Hypertensive Medications  Protocol Criteria:  · Appointment scheduled in the past 6 months or in the next 3 months  · BMP or CMP in the past 12 months  · Creatinine resul

## 2019-11-01 ENCOUNTER — OFFICE VISIT (OUTPATIENT)
Dept: SPEECH THERAPY | Facility: HOSPITAL | Age: 79
End: 2019-11-01
Attending: INTERNAL MEDICINE
Payer: MEDICARE

## 2019-11-01 DIAGNOSIS — R13.12 OROPHARYNGEAL DYSPHAGIA: ICD-10-CM

## 2019-11-01 PROCEDURE — 92610 EVALUATE SWALLOWING FUNCTION: CPT

## 2019-11-01 NOTE — PATIENT INSTRUCTIONS
SWALLOW INSTRUCTIONS    DIET:  Regular foods and liquids    SIT UPRIGHT    SMALL BITES    SMALL SIPS    EAT SLOWLY    SWALLOW TWICE WITH EACH BITE      No treatment is warranted due to functional swallow.     Patient should continue completing oral motor ex

## 2019-11-01 NOTE — PROGRESS NOTES
ADULT SWALLOWING EVALUATION:   Referring Physician: Dr. Kady Mcfarland  Diagnosis: dysphagia     Date of Service: 11/1/2019     PATIENT SUMMARY   Mitra Espinoza is a 67year old female who was referred for swallow evaluation to determine tolerance of PO diet an FCM=6/7  Precautions:  Aspiration    OBJECTIVE:   ORAL MOTOR EXAMINATION  Dentition: scattered natural dentition  Facial and Oral Structure/Appearance: slightly flat nasolabial fold on rith  Symmetry: as above  Strength: mildly reduced on right  Tone:  WNL removed. Patient/Family was advised of these findings, precautions, and recommendations verbally and in writing.     Thank you for your referral.    Sincerely,  Ron Rodriguez MA/CCC-SLP  Speech Language Pathologist  [address] [facility]  [phone]

## 2019-11-04 ENCOUNTER — PATIENT MESSAGE (OUTPATIENT)
Dept: GASTROENTEROLOGY | Facility: CLINIC | Age: 79
End: 2019-11-04

## 2019-11-04 NOTE — TELEPHONE ENCOUNTER
Dr. Radford Collet-    See speech therapy OV note from 11/1/2019: \"No treatment is warranted due to functional swallow. Patient should continue completing oral motor exercises.     She should follow all recommended strategies including: small bites, small,single si

## 2019-11-04 NOTE — TELEPHONE ENCOUNTER
From: Mata Ching  To: Nichole Jacobo MD  Sent: 11/4/2019 3:11 PM CST  Subject: Visit Follow-up Question    Hello,  My mom has been approved for the removal of her g-tube and i know this is done at the [facility] .  Can someone give me a

## 2019-11-04 NOTE — TELEPHONE ENCOUNTER
This can be removed in the office.     Please offer this Wednesday at 3 PM at the [facility]    Thank you    MD Delia Hooks - Gastroenterology  11/4/2019  4:38 PM

## 2019-11-04 NOTE — TELEPHONE ENCOUNTER
Pt daughter, Brianna Morales (OK per pt consent) informed of Dr. Marleni Yates message below. She accepted the following appt at the [facility], told to arrive 15 mins earlier, and same instructions sent to her via [employer].  She verbalized understanding:  Future Appointments   Date T

## 2019-11-06 ENCOUNTER — OFFICE VISIT (OUTPATIENT)
Dept: GASTROENTEROLOGY | Facility: CLINIC | Age: 79
End: 2019-11-06
Payer: COMMERCIAL

## 2019-11-06 VITALS
BODY MASS INDEX: 24.77 KG/M2 | SYSTOLIC BLOOD PRESSURE: 153 MMHG | HEART RATE: 50 BPM | HEIGHT: 58 IN | DIASTOLIC BLOOD PRESSURE: 64 MMHG | WEIGHT: 118 LBS

## 2019-11-06 DIAGNOSIS — Z93.1 S/P PERCUTANEOUS ENDOSCOPIC GASTROSTOMY (PEG) TUBE PLACEMENT (HCC): Primary | ICD-10-CM

## 2019-11-06 PROCEDURE — 3008F BODY MASS INDEX DOCD: CPT | Performed by: INTERNAL MEDICINE

## 2019-11-06 PROCEDURE — G0463 HOSPITAL OUTPT CLINIC VISIT: HCPCS | Performed by: INTERNAL MEDICINE

## 2019-11-06 PROCEDURE — 99214 OFFICE O/P EST MOD 30 MIN: CPT | Performed by: INTERNAL MEDICINE

## 2019-11-06 PROCEDURE — 3077F SYST BP >= 140 MM HG: CPT | Performed by: INTERNAL MEDICINE

## 2019-11-06 PROCEDURE — 3078F DIAST BP <80 MM HG: CPT | Performed by: INTERNAL MEDICINE

## 2019-11-06 NOTE — PROGRESS NOTES
[facility], [facility] - Gastroenterology                                                                                                  Clinic Progress Note    Patient pr [facility] ENDOSCOPY   • HYSTERECTOMY  1980   • TUBE INSERTION  11/2018   • YAG CAPSULOTOMY - OD - RIGHT EYE Right 01/17/2018    LILA   • YAG CAPSULOTOMY - OS - LEFT EYE Left 01/24/2018    LILA      Family Hx:   Family History   Problem Relation Age of Onset   • Ayaka 3   • Multiple Vitamin (MULTI-DAY VITAMINS OR) Take 1 tablet by mouth daily. • Fluticasone Propionate 50 MCG/ACT Nasal Suspension 2 sprays by Each Nare route daily. • Cholecalciferol (VITAMIN D3) 3000 units Oral Tab Take 3,000 Units by mouth daily. This Visit:  No orders of the defined types were placed in this encounter.     Meds This Visit:  Requested Prescriptions      No prescriptions requested or ordered in this encounter     Imaging & Referrals:  None     Zahraa Griffin MD  [facility], [facility] -

## 2019-11-12 ENCOUNTER — APPOINTMENT (OUTPATIENT)
Dept: SPEECH THERAPY | Facility: HOSPITAL | Age: 79
End: 2019-11-12
Attending: INTERNAL MEDICINE
Payer: MEDICARE

## 2019-11-15 ENCOUNTER — APPOINTMENT (OUTPATIENT)
Dept: SPEECH THERAPY | Facility: HOSPITAL | Age: 79
End: 2019-11-15
Attending: INTERNAL MEDICINE
Payer: MEDICARE

## 2019-11-22 ENCOUNTER — PATIENT MESSAGE (OUTPATIENT)
Dept: FAMILY MEDICINE CLINIC | Facility: CLINIC | Age: 79
End: 2019-11-22

## 2019-11-22 DIAGNOSIS — Z91.81 RISK FOR FALLS: Primary | ICD-10-CM

## 2019-11-22 NOTE — TELEPHONE ENCOUNTER
From: Gina Mcgrath  To: 60 Julieta Goetz MD  Sent: 11/22/2019 10:37 AM CST  Subject: Non-Urgent Samaria Pollack dr! Mom is going to need a walker and I found a company that takes her insurance.  I know you sent prescription to another company but th

## 2019-11-26 ENCOUNTER — HOSPITAL ENCOUNTER (EMERGENCY)
Facility: HOSPITAL | Age: 79
Discharge: HOME | End: 2019-11-26
Attending: EMERGENCY MEDICINE
Payer: MEDICARE

## 2019-11-26 VITALS
TEMPERATURE: 96 F | OXYGEN SATURATION: 98 % | BODY MASS INDEX: 25 KG/M2 | HEART RATE: 60 BPM | RESPIRATION RATE: 16 BRPM | WEIGHT: 120 LBS | DIASTOLIC BLOOD PRESSURE: 57 MMHG | SYSTOLIC BLOOD PRESSURE: 148 MMHG

## 2019-11-26 DIAGNOSIS — K94.22: Primary | ICD-10-CM

## 2019-11-26 PROCEDURE — 82962 GLUCOSE BLOOD TEST: CPT

## 2019-11-26 PROCEDURE — 81001 URINALYSIS AUTO W/SCOPE: CPT | Performed by: EMERGENCY MEDICINE

## 2019-11-26 PROCEDURE — 99283 EMERGENCY DEPT VISIT LOW MDM: CPT

## 2019-11-26 NOTE — ED NOTES
Pt arrive from triage, alert, oriented, stated \" I notice my stomach from Gt tube site is red, I think is infected, when I drink water comes out, also I have been urinating more frequent than usual, alert and oriented, not in distress, call light with dayron

## 2019-11-26 NOTE — ED INITIAL ASSESSMENT (HPI)
Pt to ER with concerns with possible infection to g-tube site. Per Family pt had G-tube removed about 3 weeks ago. Pt c/o redness and tenderness to site. Pt denies fever.

## 2019-11-26 NOTE — TELEPHONE ENCOUNTER
Called pt and left her a message I have been faxing her referral for DME walker rollator fax keeps failing [phone] wanted to ask her if she has a different fax number

## 2019-11-26 NOTE — ED PROVIDER NOTES
Patient Seen in: [facility] AND [facility] Emergency Department    History   Patient presents with:  Cellulitis (integumentary, infectious)    Stated Complaint: wound     HPI     73-year-old female with past medical history of CVA, hypertension, dyslipidemia, diab WEEK   METOPROLOL TARTRATE 50 MG Oral Tab,  TAKE 1 TABLET PER G TUBE  ROUTE TWO TIMES DAILY   ATORVASTATIN 80 MG Oral Tab,  TAKE 1 TABLET PER G TUBE  ROUTE NIGHTLY   AMLODIPINE BESYLATE 5 MG Oral Tab,  TAKE 1 TABLET PER G TUBE  ROUTE NIGHTLY   LOSARTAN POT negative except as noted above. PSFH elements reviewed from today and agreed except as otherwise stated in HPI.     Physical Exam     ED Triage Vitals [11/26/19 1544]   /47   Pulse 55   Resp 16   Temp 97.8 °F (36.6 °C)   Temp src Temporal   SpO2 96 care with a primary care provider within the next three months to obtain basic health screening including reassessment of your blood pressure.       You had elevated blood pressure today and you need to follow up with your doctor for a repeat blood pressure

## 2019-11-27 NOTE — ED NOTES
Patient and son verbalized understanding of d/c instructions and appropriate follow-up information. Given copy of d/c papers and rx x1.

## 2019-12-04 ENCOUNTER — TELEPHONE (OUTPATIENT)
Dept: FAMILY MEDICINE CLINIC | Facility: CLINIC | Age: 79
End: 2019-12-04

## 2019-12-04 NOTE — TELEPHONE ENCOUNTER
Just on FYI on message below DNR [ x]   DNI  [ x ]    INTERVAL HPI/OVERNIGHT EVENTS:  tmax of 100.2 overnight 101 yesterday     PRESSORS: [ ] YES [x ] NO  WHICH:      azithromycin  IVPB 500 milliGRAM(s) IV Intermittent every 24 hours  day 3  azithromycin  IVPB      cefTRIAXone   IVPB 1 Gram(s) IV Intermittent every 24 hours    Cardiovascular: Normal Left Ventricular Systolic Function, (EF = 55 to 60%) Normal left ventricular internal dimensions and wall thicknesses. Grade II diastolic dysfunction.    hydrALAZINE Injectable 10 milliGRAM(s) IV Push every 8 hours    Hematalogic:  heparin  Injectable 5000 Unit(s) SubCutaneous every 8 hours    Other:  acetaminophen  Suppository 650 milliGRAM(s) Rectal every 6 hours PRN  aspirin Suppository 300 milliGRAM(s) Rectal daily  atorvastatin 40 milliGRAM(s) Oral at bedtime  chlorhexidine 4% Liquid 1 Application(s) Topical <User Schedule>  dextrose 5% + sodium chloride 0.9%. 1000 milliLiter(s) IV Continuous <Continuous>  levothyroxine Injectable 50 MICROGram(s) IV Push at bedtime  LORazepam   Injectable 1 milliGRAM(s) IV Push every 4 hours PRN    acetaminophen  Suppository 650 milliGRAM(s) Rectal every 6 hours PRN  aspirin Suppository 300 milliGRAM(s) Rectal daily  atorvastatin 40 milliGRAM(s) Oral at bedtime  azithromycin  IVPB 500 milliGRAM(s) IV Intermittent every 24 hours  azithromycin  IVPB      cefTRIAXone   IVPB 1 Gram(s) IV Intermittent every 24 hours  chlorhexidine 4% Liquid 1 Application(s) Topical <User Schedule>  dextrose 5% + sodium chloride 0.9%. 1000 milliLiter(s) IV Continuous <Continuous>  heparin  Injectable 5000 Unit(s) SubCutaneous every 8 hours  hydrALAZINE Injectable 10 milliGRAM(s) IV Push every 8 hours  levothyroxine Injectable 50 MICROGram(s) IV Push at bedtime  LORazepam   Injectable 1 milliGRAM(s) IV Push every 4 hours PRN    Drug Dosing Weight  Height (cm): 167.64 (12 Jun 2018 07:19)  Weight (kg): 72.6 (12 Jun 2018 11:15)  BMI (kg/m2): 25.8 (12 Jun 2018 11:15)  BSA (m2): 1.82 (12 Jun 2018 11:15)    CENTRAL LINE: [ ] YES [x ] NO  LOCATION:   DATE INSERTED:  REMOVE: [ ] YES [ ] NO  EXPLAIN:    CH: [ ] YES [ x] NO    DATE INSERTED:  REMOVE:  [ ] YES [ ] NO  EXPLAIN:    A-LINE:  [ ] YES [x ] NO  LOCATION:   DATE INSERTED:  REMOVE:  [ ] YES [ ] NO  EXPLAIN:    PAST MEDICAL & SURGICAL HISTORY:  Neurogenic bladder  Hypothyroidism  BPH (benign prostatic hyperplasia)  Hypothyroid        06-13 @ 07:01  -  06-14 @ 07:00  --------------------------------------------------------  IN: 810 mL / OUT: 27 mL / NET: 783 mL        PHYSICAL EXAM:    GENERAL: sleeping in bed in NAD,   HEAD:  Atraumatic, Normocephalic  EYES: EOMI, PERRLA, conjunctiva and sclera clear  ENMT: No tonsillar erythema, exudates, or enlargement; Moist mucous membranes,   NECK: Supple, No JVD, Normal thyroid  NERVOUS SYSTEM:  periods of agitation   CHEST/LUNG: Clear to percussion bilaterally; No rales, rhonchi, wheezing, or rubs  HEART: Regular rate and rhythm; No murmurs, rubs, or gallops  ABDOMEN: Soft, Nontender, Nondistended; Bowel sounds present  EXTREMITIES:  2+ Peripheral Pulses, No clubbing, cyanosis, or edema  LYMPH: No lymphadenopathy noted  SKIN: No rashes or lesions                          8.5    10.1  )-----------( 150      ( 15 Colton 2018 06:25 )             27.0   06-15    141  |  108  |  35<H>  ----------------------------<  96  3.6   |  26  |  0.85    Ca    8.2<L>      15 Colton 2018 06:25  Phos  2.4     06-14  Mg     2.0     06-14    TPro  6.3  /  Alb  2.4<L>  /  TBili  0.7  /  DBili  x   /  AST  13  /  ALT  13  /  AlkPhos  72  06-15            [ x ]  DVT Prophylaxis - subc heparin  [x  ]  Nutrition, Brand, Rate - dysphagia nectar         Goal Rate         Abdominal Nutritional Status -  Malnutrition   Cachexia      Morbid Obesity BMI >/=40    RADIOLOGY & ADDITIONAL STUDIES:  ***    [  ] Goals of Care Discussion with Family/Proxy/Other           Elements of Conversation Discussed: Patient/Family understanding of current illness   Advanced Directives                                                                       Prognosis  Treatment Options  Care Aligned with patient's wishes                                             TIME SPENT: 35 minutes

## 2019-12-04 NOTE — TELEPHONE ENCOUNTER
[facility] calling to advise they ware not taking orders for durable medical equipment at this time and the order for the Elmon Lather will not be accepted at this time.      See communication from 11/22/2019, patient message

## 2020-01-17 RX ORDER — AMLODIPINE BESYLATE 5 MG/1
TABLET ORAL
Qty: 90 TABLET | Refills: 0 | Status: SHIPPED | OUTPATIENT
Start: 2020-01-17 | End: 2020-05-09

## 2020-01-17 RX ORDER — ATORVASTATIN CALCIUM 80 MG/1
TABLET, FILM COATED ORAL
Qty: 90 TABLET | Refills: 0 | Status: SHIPPED | OUTPATIENT
Start: 2020-01-17 | End: 2020-05-05

## 2020-01-23 NOTE — TELEPHONE ENCOUNTER
[facility] need for follow up. If patient does not read message or make an appt in next couple days, please phone patient with 1 phone attempt. Leave a detailed message on voicemail if able, close encounter.

## 2020-01-29 ENCOUNTER — OFFICE VISIT (OUTPATIENT)
Dept: FAMILY MEDICINE CLINIC | Facility: CLINIC | Age: 80
End: 2020-01-29
Payer: COMMERCIAL

## 2020-01-29 VITALS
HEIGHT: 58 IN | WEIGHT: 118 LBS | HEART RATE: 76 BPM | SYSTOLIC BLOOD PRESSURE: 121 MMHG | TEMPERATURE: 97 F | BODY MASS INDEX: 24.77 KG/M2 | DIASTOLIC BLOOD PRESSURE: 60 MMHG

## 2020-01-29 DIAGNOSIS — Z86.73 HISTORY OF CVA (CEREBROVASCULAR ACCIDENT): ICD-10-CM

## 2020-01-29 DIAGNOSIS — L03.316 CELLULITIS OF UMBILICUS: ICD-10-CM

## 2020-01-29 DIAGNOSIS — I10 ESSENTIAL HYPERTENSION WITH GOAL BLOOD PRESSURE LESS THAN 140/90: ICD-10-CM

## 2020-01-29 DIAGNOSIS — E78.00 HYPERCHOLESTEREMIA: ICD-10-CM

## 2020-01-29 DIAGNOSIS — E11.9 CONTROLLED TYPE 2 DIABETES MELLITUS WITHOUT COMPLICATION, WITHOUT LONG-TERM CURRENT USE OF INSULIN (HCC): Primary | ICD-10-CM

## 2020-01-29 PROCEDURE — 3074F SYST BP LT 130 MM HG: CPT | Performed by: FAMILY MEDICINE

## 2020-01-29 PROCEDURE — 99214 OFFICE O/P EST MOD 30 MIN: CPT | Performed by: FAMILY MEDICINE

## 2020-01-29 PROCEDURE — G0463 HOSPITAL OUTPT CLINIC VISIT: HCPCS | Performed by: FAMILY MEDICINE

## 2020-01-29 PROCEDURE — 3078F DIAST BP <80 MM HG: CPT | Performed by: FAMILY MEDICINE

## 2020-01-29 PROCEDURE — 3008F BODY MASS INDEX DOCD: CPT | Performed by: FAMILY MEDICINE

## 2020-01-29 RX ORDER — CEPHALEXIN 500 MG/1
500 CAPSULE ORAL 2 TIMES DAILY
Qty: 14 CAPSULE | Refills: 0 | Status: SHIPPED | OUTPATIENT
Start: 2020-01-29 | End: 2020-02-05

## 2020-01-29 NOTE — PROGRESS NOTES
HPI:    Patient ID: Layton Hardin is a 67year old female.     HPI  Patient presents with:  Blood Pressure: follow up   Diabetes: follow up     Patient overdue for labs  Daughter who brings her fractured her ankle so wasn't able to bring her in     C/o righ (GLUCERNA) Oral Liquid Take 1 Dose by mouth daily as needed.  90 Bottle 1   • ONETOUCH DELICA LANCETS 04L Does not apply Misc      • Blood Glucose Monitoring Suppl (BLOOD GLUCOSE MONITOR SYSTEM) w/Device Does not apply Kit Use as directed 1 kit 0   • Glucos exhibit a depressed mood.               ASSESSMENT/PLAN:   Controlled type 2 diabetes mellitus without complication, without long-term current use of insulin (hcc)  (primary encounter diagnosis)  Hypercholesteremia  Essential hypertension with goal blood pr

## 2020-02-09 ENCOUNTER — APPOINTMENT (OUTPATIENT)
Dept: LAB | Facility: HOSPITAL | Age: 80
End: 2020-02-09
Attending: FAMILY MEDICINE
Payer: MEDICARE

## 2020-02-09 LAB
ALBUMIN SERPL-MCNC: 3.8 G/DL (ref 3.4–5)
ALBUMIN/GLOB SERPL: 1.2 {RATIO} (ref 1–2)
ALP LIVER SERPL-CCNC: 88 U/L (ref 55–142)
ALT SERPL-CCNC: 35 U/L (ref 13–56)
ANION GAP SERPL CALC-SCNC: 6 MMOL/L (ref 0–18)
AST SERPL-CCNC: 24 U/L (ref 15–37)
BILIRUB SERPL-MCNC: 0.5 MG/DL (ref 0.1–2)
BUN BLD-MCNC: 16 MG/DL (ref 7–18)
BUN/CREAT SERPL: 17.2 (ref 10–20)
CALCIUM BLD-MCNC: 8.5 MG/DL (ref 8.5–10.1)
CHLORIDE SERPL-SCNC: 110 MMOL/L (ref 98–112)
CHOLEST SMN-MCNC: 130 MG/DL (ref ?–200)
CO2 SERPL-SCNC: 27 MMOL/L (ref 21–32)
CREAT BLD-MCNC: 0.93 MG/DL (ref 0.55–1.02)
CREAT UR-SCNC: 83.9 MG/DL
EST. AVERAGE GLUCOSE BLD GHB EST-MCNC: 134 MG/DL (ref 68–126)
GLOBULIN PLAS-MCNC: 3.2 G/DL (ref 2.8–4.4)
GLUCOSE BLD-MCNC: 134 MG/DL (ref 70–99)
HBA1C MFR BLD HPLC: 6.3 % (ref ?–5.7)
HDLC SERPL-MCNC: 56 MG/DL (ref 40–59)
LDLC SERPL CALC-MCNC: 53 MG/DL (ref ?–100)
M PROTEIN MFR SERPL ELPH: 7 G/DL (ref 6.4–8.2)
MICROALBUMIN UR-MCNC: 1.21 MG/DL
MICROALBUMIN/CREAT 24H UR-RTO: 14.4 UG/MG (ref ?–30)
NONHDLC SERPL-MCNC: 74 MG/DL (ref ?–130)
OSMOLALITY SERPL CALC.SUM OF ELEC: 299 MOSM/KG (ref 275–295)
PATIENT FASTING Y/N/NP: YES
PATIENT FASTING Y/N/NP: YES
POTASSIUM SERPL-SCNC: 3.9 MMOL/L (ref 3.5–5.1)
SODIUM SERPL-SCNC: 143 MMOL/L (ref 136–145)
TRIGL SERPL-MCNC: 104 MG/DL (ref 30–149)
VLDLC SERPL CALC-MCNC: 21 MG/DL (ref 0–30)

## 2020-02-09 PROCEDURE — 83036 HEMOGLOBIN GLYCOSYLATED A1C: CPT | Performed by: FAMILY MEDICINE

## 2020-02-09 PROCEDURE — 82570 ASSAY OF URINE CREATININE: CPT | Performed by: FAMILY MEDICINE

## 2020-02-09 PROCEDURE — 80061 LIPID PANEL: CPT | Performed by: FAMILY MEDICINE

## 2020-02-09 PROCEDURE — 80053 COMPREHEN METABOLIC PANEL: CPT | Performed by: FAMILY MEDICINE

## 2020-02-09 PROCEDURE — 82043 UR ALBUMIN QUANTITATIVE: CPT | Performed by: FAMILY MEDICINE

## 2020-02-09 PROCEDURE — 36415 COLL VENOUS BLD VENIPUNCTURE: CPT | Performed by: FAMILY MEDICINE

## 2020-02-24 RX ORDER — PAROXETINE 10 MG/1
20 TABLET, FILM COATED ORAL
Qty: 180 TABLET | Refills: 0 | Status: SHIPPED | OUTPATIENT
Start: 2020-02-24 | End: 2020-04-20

## 2020-03-04 ENCOUNTER — OFFICE VISIT (OUTPATIENT)
Dept: FAMILY MEDICINE CLINIC | Facility: CLINIC | Age: 80
End: 2020-03-04
Payer: COMMERCIAL

## 2020-03-04 VITALS
HEIGHT: 58 IN | WEIGHT: 121 LBS | DIASTOLIC BLOOD PRESSURE: 64 MMHG | TEMPERATURE: 97 F | BODY MASS INDEX: 25.4 KG/M2 | SYSTOLIC BLOOD PRESSURE: 158 MMHG | HEART RATE: 71 BPM

## 2020-03-04 DIAGNOSIS — Z86.73 HISTORY OF CVA (CEREBROVASCULAR ACCIDENT): Primary | ICD-10-CM

## 2020-03-04 DIAGNOSIS — Z91.81 RISK FOR FALLS: ICD-10-CM

## 2020-03-04 PROCEDURE — G0463 HOSPITAL OUTPT CLINIC VISIT: HCPCS | Performed by: FAMILY MEDICINE

## 2020-03-04 PROCEDURE — 99214 OFFICE O/P EST MOD 30 MIN: CPT | Performed by: FAMILY MEDICINE

## 2020-03-04 NOTE — PROGRESS NOTES
HPI:    Patient ID: Rudolph Goddard is a 67year old female. HPI  Patient presents with: Other: would like to discuss if she would be able to live indepandantly     Patient here today with her son. She usually comes in with her daughter.   Patient has be mouth daily as needed.  90 Bottle 1   • ONETOUCH DELICA LANCETS 62R Does not apply Misc      • Blood Glucose Monitoring Suppl (BLOOD GLUCOSE MONITOR SYSTEM) w/Device Does not apply Kit Use as directed 1 kit 0   • Glucose Blood (BLOOD GLUCOSE TEST) In Vitro and daughter who was on the phone that she may need home health to come in and do a safety evaluation at her apartment. They have done a safety evaluation at her daughter's residence but not at patient's apartment.   Discussed the limitations patient may h

## 2020-03-10 ENCOUNTER — OFFICE VISIT (OUTPATIENT)
Dept: OPHTHALMOLOGY | Facility: CLINIC | Age: 80
End: 2020-03-10
Payer: COMMERCIAL

## 2020-03-10 DIAGNOSIS — Z96.1 PSEUDOPHAKIA OF BOTH EYES: ICD-10-CM

## 2020-03-10 DIAGNOSIS — H43.391 FLOATER, VITREOUS, RIGHT: ICD-10-CM

## 2020-03-10 DIAGNOSIS — E11.9 DIET-CONTROLLED TYPE 2 DIABETES MELLITUS (HCC): Primary | ICD-10-CM

## 2020-03-10 PROCEDURE — 92014 COMPRE OPH EXAM EST PT 1/>: CPT | Performed by: OPHTHALMOLOGY

## 2020-03-10 PROCEDURE — 92015 DETERMINE REFRACTIVE STATE: CPT | Performed by: OPHTHALMOLOGY

## 2020-03-10 NOTE — PATIENT INSTRUCTIONS
Diet-controlled [facility]  Diet controlled diabetes: no background of retinopathy, no signs of neovascularization noted. Discussed ocular and systemic benefits of blood sugar control.   Diagnosis and treatment discussed in detail with

## 2020-03-10 NOTE — PROGRESS NOTES
Baljit Isabel is a 67year old female.     HPI:     HPI     Diabetic Eye Exam      Additional comments: Pt has been a diabetic for 21 years       Pt's diabetes is currently controlled by pills   Pt checks BS once a day   Pt's last blood sugar was 134 on 2/9 Never Smoker      Smokeless tobacco: Never Used    Alcohol use: No    Drug use: No      Medications:  Current Outpatient Medications   Medication Sig Dispense Refill   • PARoxetine HCl 10 MG Oral Tab Take 2 tablets (20 mg total) by mouth once daily.  180 ta cc NI NI    Near cc 20/30 20/25          Tonometry ([facility], 4:47 PM)       Right Left    Pressure 16 14          Pupils       Pupils    Right PERRL    Left PERRL          Visual Fields       Left Right     Full Full          Extraocular Movement       Right No orders of the defined types were placed in this encounter.       Meds This Visit:  Requested Prescriptions      No prescriptions requested or ordered in this encounter        Follow up instructions:  Return in about 1 year (around 3/10/2021) for Kevin Gaines

## 2020-03-18 RX ORDER — ALENDRONATE SODIUM 70 MG/1
TABLET ORAL
Qty: 12 TABLET | Refills: 1 | Status: SHIPPED | OUTPATIENT
Start: 2020-03-18 | End: 2020-08-24

## 2020-03-20 RX ORDER — LOSARTAN POTASSIUM 50 MG/1
TABLET ORAL
Qty: 180 TABLET | Refills: 1 | Status: SHIPPED | OUTPATIENT
Start: 2020-03-20 | End: 2020-07-18

## 2020-03-20 NOTE — TELEPHONE ENCOUNTER
Refill passed per [facility] protocol. High warning present please advise if okay to refill.     Hypertensive Medications  Protocol Criteria:  · Appointment scheduled in the past 6 months or in the next 3 months  · BMP or CMP in the past 12 months  · C

## 2020-04-09 RX ORDER — METOPROLOL TARTRATE 50 MG/1
TABLET, FILM COATED ORAL
Qty: 180 TABLET | Refills: 1 | Status: SHIPPED | OUTPATIENT
Start: 2020-04-09 | End: 2020-09-07

## 2020-04-09 RX ORDER — FAMOTIDINE 20 MG/1
TABLET ORAL
Qty: 90 TABLET | Refills: 1 | Status: SHIPPED | OUTPATIENT
Start: 2020-04-09 | End: 2020-09-07

## 2020-04-20 RX ORDER — PAROXETINE 10 MG/1
TABLET, FILM COATED ORAL
Qty: 180 TABLET | Refills: 0 | Status: SHIPPED | OUTPATIENT
Start: 2020-04-20 | End: 2020-07-14

## 2020-05-05 RX ORDER — ATORVASTATIN CALCIUM 80 MG/1
TABLET, FILM COATED ORAL
Qty: 90 TABLET | Refills: 0 | Status: SHIPPED | OUTPATIENT
Start: 2020-05-05 | End: 2020-07-28

## 2020-05-09 RX ORDER — AMLODIPINE BESYLATE 5 MG/1
TABLET ORAL
Qty: 90 TABLET | Refills: 0 | Status: SHIPPED | OUTPATIENT
Start: 2020-05-09 | End: 2020-10-25

## 2020-07-14 RX ORDER — PAROXETINE 10 MG/1
TABLET, FILM COATED ORAL
Qty: 180 TABLET | Refills: 0 | Status: SHIPPED | OUTPATIENT
Start: 2020-07-14 | End: 2020-08-31

## 2020-07-15 ENCOUNTER — TELEPHONE (OUTPATIENT)
Dept: CASE MANAGEMENT | Age: 80
End: 2020-07-15

## 2020-07-15 NOTE — TELEPHONE ENCOUNTER
Patient is eligible for a 2020 Medicare Advantage Supervisit. Daughter notified. Appt scheduled for 8/5/20.

## 2020-07-18 RX ORDER — LOSARTAN POTASSIUM 50 MG/1
TABLET ORAL
Qty: 180 TABLET | Refills: 1 | Status: SHIPPED | OUTPATIENT
Start: 2020-07-18 | End: 2020-12-30

## 2020-07-28 RX ORDER — ATORVASTATIN CALCIUM 80 MG/1
TABLET, FILM COATED ORAL
Qty: 90 TABLET | Refills: 3 | Status: SHIPPED | OUTPATIENT
Start: 2020-07-28 | End: 2021-06-21

## 2020-08-05 ENCOUNTER — APPOINTMENT (OUTPATIENT)
Dept: LAB | Facility: HOSPITAL | Age: 80
End: 2020-08-05
Attending: FAMILY MEDICINE
Payer: MEDICARE

## 2020-08-05 ENCOUNTER — OFFICE VISIT (OUTPATIENT)
Dept: FAMILY MEDICINE CLINIC | Facility: CLINIC | Age: 80
End: 2020-08-05
Payer: COMMERCIAL

## 2020-08-05 VITALS
TEMPERATURE: 98 F | BODY MASS INDEX: 25.19 KG/M2 | DIASTOLIC BLOOD PRESSURE: 70 MMHG | SYSTOLIC BLOOD PRESSURE: 145 MMHG | HEIGHT: 58 IN | HEART RATE: 52 BPM | WEIGHT: 120 LBS

## 2020-08-05 DIAGNOSIS — Z86.73 HISTORY OF CVA (CEREBROVASCULAR ACCIDENT): ICD-10-CM

## 2020-08-05 DIAGNOSIS — M54.41 CHRONIC BILATERAL LOW BACK PAIN WITH BILATERAL SCIATICA: ICD-10-CM

## 2020-08-05 DIAGNOSIS — G89.29 CHRONIC BILATERAL LOW BACK PAIN WITH BILATERAL SCIATICA: ICD-10-CM

## 2020-08-05 DIAGNOSIS — M54.42 CHRONIC BILATERAL LOW BACK PAIN WITH BILATERAL SCIATICA: ICD-10-CM

## 2020-08-05 DIAGNOSIS — Z00.00 ENCOUNTER FOR ANNUAL HEALTH EXAMINATION: Primary | ICD-10-CM

## 2020-08-05 DIAGNOSIS — M81.0 OSTEOPOROSIS, UNSPECIFIED OSTEOPOROSIS TYPE, UNSPECIFIED PATHOLOGICAL FRACTURE PRESENCE: ICD-10-CM

## 2020-08-05 DIAGNOSIS — M15.9 PRIMARY OSTEOARTHRITIS INVOLVING MULTIPLE JOINTS: ICD-10-CM

## 2020-08-05 DIAGNOSIS — Z91.81 RISK FOR FALLS: ICD-10-CM

## 2020-08-05 DIAGNOSIS — I10 ESSENTIAL HYPERTENSION WITH GOAL BLOOD PRESSURE LESS THAN 140/90: ICD-10-CM

## 2020-08-05 DIAGNOSIS — E78.00 HYPERCHOLESTEREMIA: ICD-10-CM

## 2020-08-05 DIAGNOSIS — F33.0 MILD RECURRENT MAJOR DEPRESSION (HCC): Chronic | ICD-10-CM

## 2020-08-05 DIAGNOSIS — R13.10 DYSPHAGIA, UNSPECIFIED TYPE: ICD-10-CM

## 2020-08-05 DIAGNOSIS — E11.9 CONTROLLED TYPE 2 DIABETES MELLITUS WITHOUT COMPLICATION, WITHOUT LONG-TERM CURRENT USE OF INSULIN (HCC): ICD-10-CM

## 2020-08-05 DIAGNOSIS — H52.4 PRESBYOPIA: ICD-10-CM

## 2020-08-05 PROBLEM — M26.621 ARTHRALGIA OF RIGHT TEMPOROMANDIBULAR JOINT: Status: RESOLVED | Noted: 2017-09-27 | Resolved: 2020-08-05

## 2020-08-05 PROBLEM — R29.898 WEAKNESS OF RIGHT UPPER EXTREMITY: Status: RESOLVED | Noted: 2018-12-06 | Resolved: 2020-08-05

## 2020-08-05 PROBLEM — R47.9 DIFFICULTY WITH SPEECH: Status: RESOLVED | Noted: 2018-12-06 | Resolved: 2020-08-05

## 2020-08-05 PROBLEM — I63.9 ACUTE CVA (CEREBROVASCULAR ACCIDENT) (HCC): Status: RESOLVED | Noted: 2019-03-27 | Resolved: 2020-08-05

## 2020-08-05 PROBLEM — H43.391 FLOATER, VITREOUS, RIGHT: Status: RESOLVED | Noted: 2020-03-10 | Resolved: 2020-08-05

## 2020-08-05 PROBLEM — I63.9 CEREBROVASCULAR ACCIDENT (CVA), UNSPECIFIED MECHANISM (HCC): Status: RESOLVED | Noted: 2018-11-14 | Resolved: 2020-08-05

## 2020-08-05 PROBLEM — I63.9 CEREBROVASCULAR ACCIDENT (CVA) (HCC): Status: RESOLVED | Noted: 2018-11-14 | Resolved: 2020-08-05

## 2020-08-05 PROBLEM — I99.8 FLUCTUATING BLOOD PRESSURE: Status: RESOLVED | Noted: 2017-12-13 | Resolved: 2020-08-05

## 2020-08-05 PROBLEM — Z93.4 GASTROJEJUNOSTOMY TUBE STATUS (HCC): Status: RESOLVED | Noted: 2018-12-06 | Resolved: 2020-08-05

## 2020-08-05 PROBLEM — D17.1 LIPOMA OF TORSO: Status: RESOLVED | Noted: 2018-06-13 | Resolved: 2020-08-05

## 2020-08-05 LAB
ALBUMIN SERPL-MCNC: 3.7 G/DL (ref 3.4–5)
ALBUMIN/GLOB SERPL: 1.1 {RATIO} (ref 1–2)
ALP LIVER SERPL-CCNC: 106 U/L (ref 55–142)
ALT SERPL-CCNC: 36 U/L (ref 13–56)
ANION GAP SERPL CALC-SCNC: 3 MMOL/L (ref 0–18)
AST SERPL-CCNC: 28 U/L (ref 15–37)
BILIRUB SERPL-MCNC: 0.5 MG/DL (ref 0.1–2)
BUN BLD-MCNC: 19 MG/DL (ref 7–18)
BUN/CREAT SERPL: 18.1 (ref 10–20)
CALCIUM BLD-MCNC: 8.7 MG/DL (ref 8.5–10.1)
CHLORIDE SERPL-SCNC: 106 MMOL/L (ref 98–112)
CO2 SERPL-SCNC: 31 MMOL/L (ref 21–32)
CREAT BLD-MCNC: 1.05 MG/DL (ref 0.55–1.02)
EST. AVERAGE GLUCOSE BLD GHB EST-MCNC: 143 MG/DL (ref 68–126)
GLOBULIN PLAS-MCNC: 3.4 G/DL (ref 2.8–4.4)
GLUCOSE BLD-MCNC: 174 MG/DL (ref 70–99)
HBA1C MFR BLD HPLC: 6.6 % (ref ?–5.7)
M PROTEIN MFR SERPL ELPH: 7.1 G/DL (ref 6.4–8.2)
OSMOLALITY SERPL CALC.SUM OF ELEC: 296 MOSM/KG (ref 275–295)
PATIENT FASTING Y/N/NP: NO
POTASSIUM SERPL-SCNC: 3.7 MMOL/L (ref 3.5–5.1)
SODIUM SERPL-SCNC: 140 MMOL/L (ref 136–145)

## 2020-08-05 PROCEDURE — G0439 PPPS, SUBSEQ VISIT: HCPCS | Performed by: FAMILY MEDICINE

## 2020-08-05 PROCEDURE — 3077F SYST BP >= 140 MM HG: CPT | Performed by: FAMILY MEDICINE

## 2020-08-05 PROCEDURE — 3008F BODY MASS INDEX DOCD: CPT | Performed by: FAMILY MEDICINE

## 2020-08-05 PROCEDURE — 80053 COMPREHEN METABOLIC PANEL: CPT | Performed by: FAMILY MEDICINE

## 2020-08-05 PROCEDURE — 83036 HEMOGLOBIN GLYCOSYLATED A1C: CPT | Performed by: FAMILY MEDICINE

## 2020-08-05 PROCEDURE — 36415 COLL VENOUS BLD VENIPUNCTURE: CPT | Performed by: FAMILY MEDICINE

## 2020-08-05 PROCEDURE — 99397 PER PM REEVAL EST PAT 65+ YR: CPT | Performed by: FAMILY MEDICINE

## 2020-08-05 PROCEDURE — 3078F DIAST BP <80 MM HG: CPT | Performed by: FAMILY MEDICINE

## 2020-08-05 PROCEDURE — 96160 PT-FOCUSED HLTH RISK ASSMT: CPT | Performed by: FAMILY MEDICINE

## 2020-08-05 NOTE — PROGRESS NOTES
HPI:   Ken Cutler is a 79year old female who presents for a MA (Medicare Advantage) Supervisit (Once per calendar year).     Lives alone  Daughter checks in on her frequently  Her last annual assessment has been over 1 year: Annual Physical due on 12/ Family/surrogate (if present), and forms available to patient in AVS       She does NOT have a Power of  for [employer] on file in [address]    Advance care planning including the explanation and discussion of advance directives standard forms performe She is allergic to lisinopril.     CURRENT MEDICATIONS:   ATORVASTATIN 80 MG Oral Tab, TAKE 1 TABLET PER G TUBE  ROUTE NIGHTLY  LOSARTAN POTASSIUM 50 MG Oral Tab, TAKE 1 TABLET BY MOUTH TWO  TIMES DAILY  PAROXETINE HCL 10 MG Oral Tab, TAKE 2 TABLETS BY MO lens implant (Right, 2/1/16); colonoscopy (N/A, 7/20/2017); Yag Capsulotomy - OD - Right Eye (Right, 01/17/2018); Yag Capsulotomy - OS - Left Eye (Left, 01/24/2018); and Tube insertion (11/2018).     Her family history includes Diabetes in an other family m inappropriate responses:  No I avoid social activities because I cannot hear well and fear I will reply improperly:  No   Family members and friends have told me they think I may have hearing loss:   No               Visual Acuity 01/01/2013, 04/06/2016   • Pneumovax 23 03/20/2009   • Zoster Vaccine Live (Zostavax) 04/13/2013, 12/08/2015        ASSESSMENT AND OTHER RELEVANT CHRONIC CONDITIONS:   Holley Kirk is a 79year old female who presents for a Medicare Assessment.      PLAN S sciatica  Stable. Continue current medication regimen    12. Dysphagia, unspecified type  Improved. Diet assessment: fair     PLAN:  The patient indicates understanding of these issues and agrees to the plan.   Reinforced healthy diet, lifestyle, and Exam 3/10/2020       Bone Density Screening      Dexascan Every two years Last Dexa Scan:   XR DEXA BONE DENSITOMETRY (CPT=77080) 03/15/2017    No flowsheet data found.     Pap and Pelvic      Pap: Every 3 yrs age 21-65 or Pap+HPV every 5 yrs age 33-67, age 02/09/2020 3.9     POTASSIUM (P) (mmol/L)   Date Value   01/09/2016 3.8    No flowsheet data found. Creatinine  Annually Creatinine (mg/dL)   Date Value   02/09/2020 0.93    No flowsheet data found.     Drug Serum Conc  Annually No results found for: D

## 2020-08-05 NOTE — PATIENT INSTRUCTIONS
Daniele Ferguson's SCREENING SCHEDULE   Tests on this list are recommended by your physician but may not be covered, or covered at this frequency, by your insurer. Please check with your insurance carrier before scheduling to verify coverage.    PREVENTATIVE every 10 years- more often if abnormal Colonoscopy due on 07/20/2022 Update [facility] if applicable    Flex Sigmoidoscopy Screen  Covered every 5 years No results found for this or any previous visit. No flowsheet data found.      Fecal Occult Bloo Orders placed or performed in visit on 04/06/16   • PNEUMOCOCCAL VACC, 13 ABILIO IM    Please get once after your 65th birthday    Pneumococcal 23 (Pneumovax)  Covered Once after 65 No orders found for this or any previous visit.  Please get once after your 65

## 2020-08-24 RX ORDER — ALENDRONATE SODIUM 70 MG/1
TABLET ORAL
Qty: 12 TABLET | Refills: 3 | Status: SHIPPED | OUTPATIENT
Start: 2020-08-24 | End: 2021-06-21

## 2020-08-31 RX ORDER — PAROXETINE 10 MG/1
TABLET, FILM COATED ORAL
Qty: 180 TABLET | Refills: 3 | Status: SHIPPED | OUTPATIENT
Start: 2020-08-31 | End: 2021-03-24

## 2020-09-05 ENCOUNTER — MED REC SCAN ONLY (OUTPATIENT)
Dept: FAMILY MEDICINE CLINIC | Facility: CLINIC | Age: 80
End: 2020-09-05

## 2020-09-07 RX ORDER — FAMOTIDINE 20 MG/1
20 TABLET ORAL DAILY
Qty: 90 TABLET | Refills: 3 | Status: SHIPPED | OUTPATIENT
Start: 2020-09-07 | End: 2021-11-20

## 2020-09-07 RX ORDER — METOPROLOL TARTRATE 50 MG/1
TABLET, FILM COATED ORAL
Qty: 180 TABLET | Refills: 3 | Status: SHIPPED | OUTPATIENT
Start: 2020-09-07 | End: 2021-03-24

## 2020-09-29 NOTE — PROGRESS NOTES
I've placed the order    Thanks    Tom Cole MD  [facility], [facility] - Gastroenterology  10/7/2019  7:46 AM Advancement Flap (Double) Text: The defect edges were debeveled with a #15 scalpel blade.  Given the location of the defect and the proximity to free margins a double advancement flap was deemed most appropriate.  Using a sterile surgical marker, the appropriate advancement flaps were drawn incorporating the defect and placing the expected incisions within the relaxed skin tension lines where possible.    The area thus outlined was incised deep to adipose tissue with a #15 scalpel blade.  The skin margins were undermined to an appropriate distance in all directions utilizing iris scissors.

## 2020-10-13 NOTE — Clinical Note
Pt is coming for hospital follow up on 4/8/19. TE was sent to see about a sooner appt. Dtr stated the pt is doing okay since d/c. She denies the pt has stroke like symptoms except for memory concerns but this is not new.  Dtr stated the pt is going to see t

## 2020-10-25 RX ORDER — AMLODIPINE BESYLATE 5 MG/1
TABLET ORAL
Qty: 90 TABLET | Refills: 3 | Status: SHIPPED | OUTPATIENT
Start: 2020-10-25 | End: 2021-09-16

## 2020-12-30 RX ORDER — LOSARTAN POTASSIUM 50 MG/1
TABLET ORAL
Qty: 180 TABLET | Refills: 3 | Status: SHIPPED | OUTPATIENT
Start: 2020-12-30 | End: 2021-11-26

## 2021-02-03 DIAGNOSIS — Z23 NEED FOR VACCINATION: ICD-10-CM

## 2021-02-12 ENCOUNTER — LAB ENCOUNTER (OUTPATIENT)
Dept: LAB | Facility: HOSPITAL | Age: 81
End: 2021-02-12
Attending: OBSTETRICS & GYNECOLOGY
Payer: MEDICARE

## 2021-02-12 ENCOUNTER — OFFICE VISIT (OUTPATIENT)
Dept: FAMILY MEDICINE CLINIC | Facility: CLINIC | Age: 81
End: 2021-02-12
Payer: COMMERCIAL

## 2021-02-12 ENCOUNTER — NURSE TRIAGE (OUTPATIENT)
Dept: FAMILY MEDICINE CLINIC | Facility: CLINIC | Age: 81
End: 2021-02-12

## 2021-02-12 VITALS
DIASTOLIC BLOOD PRESSURE: 52 MMHG | HEART RATE: 55 BPM | BODY MASS INDEX: 25.4 KG/M2 | TEMPERATURE: 98 F | HEIGHT: 58 IN | SYSTOLIC BLOOD PRESSURE: 129 MMHG | WEIGHT: 121 LBS

## 2021-02-12 DIAGNOSIS — F03.90 DEMENTIA WITHOUT BEHAVIORAL DISTURBANCE, UNSPECIFIED DEMENTIA TYPE (HCC): ICD-10-CM

## 2021-02-12 DIAGNOSIS — E11.9 CONTROLLED TYPE 2 DIABETES MELLITUS WITHOUT COMPLICATION, WITHOUT LONG-TERM CURRENT USE OF INSULIN (HCC): ICD-10-CM

## 2021-02-12 DIAGNOSIS — K13.79 PAIN IN MOUTH: Primary | ICD-10-CM

## 2021-02-12 DIAGNOSIS — D69.6 THROMBOCYTOPENIA (HCC): ICD-10-CM

## 2021-02-12 PROBLEM — M46.96 UNSPECIFIED INFLAMMATORY SPONDYLOPATHY, LUMBAR REGION (HCC): Status: ACTIVE | Noted: 2021-02-12

## 2021-02-12 PROBLEM — N18.30 CKD (CHRONIC KIDNEY DISEASE) STAGE 3, GFR 30-59 ML/MIN (HCC): Chronic | Status: ACTIVE | Noted: 2021-02-12

## 2021-02-12 PROBLEM — M46.96 UNSPECIFIED INFLAMMATORY SPONDYLOPATHY, LUMBAR REGION: Status: ACTIVE | Noted: 2021-02-12

## 2021-02-12 LAB
CREAT UR-SCNC: 337 MG/DL
DEPRECATED RDW RBC AUTO: 38.7 FL (ref 35.1–46.3)
ERYTHROCYTE [DISTWIDTH] IN BLOOD BY AUTOMATED COUNT: 12.2 % (ref 11–15)
EST. AVERAGE GLUCOSE BLD GHB EST-MCNC: 169 MG/DL (ref 68–126)
HBA1C MFR BLD HPLC: 7.5 % (ref ?–5.7)
HCT VFR BLD AUTO: 38.6 %
HGB BLD-MCNC: 13.4 G/DL
MCH RBC QN AUTO: 30.1 PG (ref 26–34)
MCHC RBC AUTO-ENTMCNC: 34.7 G/DL (ref 31–37)
MCV RBC AUTO: 86.7 FL
MICROALBUMIN UR-MCNC: 5.1 MG/DL
MICROALBUMIN/CREAT 24H UR-RTO: 15.1 UG/MG (ref ?–30)
PLATELET # BLD AUTO: 136 10(3)UL (ref 150–450)
RBC # BLD AUTO: 4.45 X10(6)UL
TSI SER-ACNC: 3.21 MIU/ML (ref 0.36–3.74)
WBC # BLD AUTO: 4.9 X10(3) UL (ref 4–11)

## 2021-02-12 PROCEDURE — 3008F BODY MASS INDEX DOCD: CPT | Performed by: STUDENT IN AN ORGANIZED HEALTH CARE EDUCATION/TRAINING PROGRAM

## 2021-02-12 PROCEDURE — 3074F SYST BP LT 130 MM HG: CPT | Performed by: STUDENT IN AN ORGANIZED HEALTH CARE EDUCATION/TRAINING PROGRAM

## 2021-02-12 PROCEDURE — 83036 HEMOGLOBIN GLYCOSYLATED A1C: CPT | Performed by: STUDENT IN AN ORGANIZED HEALTH CARE EDUCATION/TRAINING PROGRAM

## 2021-02-12 PROCEDURE — 82570 ASSAY OF URINE CREATININE: CPT | Performed by: STUDENT IN AN ORGANIZED HEALTH CARE EDUCATION/TRAINING PROGRAM

## 2021-02-12 PROCEDURE — 82306 VITAMIN D 25 HYDROXY: CPT | Performed by: STUDENT IN AN ORGANIZED HEALTH CARE EDUCATION/TRAINING PROGRAM

## 2021-02-12 PROCEDURE — 36415 COLL VENOUS BLD VENIPUNCTURE: CPT | Performed by: STUDENT IN AN ORGANIZED HEALTH CARE EDUCATION/TRAINING PROGRAM

## 2021-02-12 PROCEDURE — 99213 OFFICE O/P EST LOW 20 MIN: CPT | Performed by: STUDENT IN AN ORGANIZED HEALTH CARE EDUCATION/TRAINING PROGRAM

## 2021-02-12 PROCEDURE — 82043 UR ALBUMIN QUANTITATIVE: CPT | Performed by: STUDENT IN AN ORGANIZED HEALTH CARE EDUCATION/TRAINING PROGRAM

## 2021-02-12 PROCEDURE — 85027 COMPLETE CBC AUTOMATED: CPT | Performed by: STUDENT IN AN ORGANIZED HEALTH CARE EDUCATION/TRAINING PROGRAM

## 2021-02-12 PROCEDURE — 84443 ASSAY THYROID STIM HORMONE: CPT | Performed by: STUDENT IN AN ORGANIZED HEALTH CARE EDUCATION/TRAINING PROGRAM

## 2021-02-12 PROCEDURE — 3078F DIAST BP <80 MM HG: CPT | Performed by: STUDENT IN AN ORGANIZED HEALTH CARE EDUCATION/TRAINING PROGRAM

## 2021-02-12 RX ORDER — AMOXICILLIN AND CLAVULANATE POTASSIUM 400; 57 MG/5ML; MG/5ML
800 POWDER, FOR SUSPENSION ORAL 2 TIMES DAILY
Qty: 140 ML | Refills: 0 | Status: SHIPPED | OUTPATIENT
Start: 2021-02-12 | End: 2021-02-19

## 2021-02-12 NOTE — PROGRESS NOTES
HPI:    Patient ID: Michelle Best is a 79year old female. HPI  Pt presenting with lump inside mouth. Daughter is present for visit, providing Amharic translation assistance. She reports tender lump on inside of Left side of mouth since 2/10.  She has be • ONETOUCH DELICA LANCETS 79C Does not apply Misc      • Blood Glucose Monitoring Suppl (BLOOD GLUCOSE MONITOR SYSTEM) w/Device Does not apply Kit Use as directed 1 kit 0   • Glucose Blood (BLOOD GLUCOSE TEST) In Vitro Strip TEST BID  strip 0   • La Breath sounds: Normal breath sounds. No wheezing, rhonchi or rales. Lymphadenopathy:      Cervical: No cervical adenopathy. Skin:     General: Skin is warm and dry. Coloration: Skin is not jaundiced.    Neurological:      General: No focal defic Sig: Take 10 mL (800 mg total) by mouth 2 (two) times daily for 7 days.        Imaging & Referrals:  None         DL#[id number]

## 2021-02-12 NOTE — TELEPHONE ENCOUNTER
Action Requested: Summary for Provider     []  Critical Lab, Recommendations Needed  [] Need Additional Advice  []   FYI    []   Need Orders  [] Need Medications Sent to Pharmacy  []  Other     SUMMARY:     Appointment made for today 2/12/21.   Per the cecille

## 2021-02-15 LAB — 25(OH)D3 SERPL-MCNC: 35.9 NG/ML (ref 30–100)

## 2021-03-04 DIAGNOSIS — E11.65 TYPE 2 DIABETES MELLITUS WITH HYPERGLYCEMIA, WITHOUT LONG-TERM CURRENT USE OF INSULIN (HCC): ICD-10-CM

## 2021-03-06 ENCOUNTER — LAB ENCOUNTER (OUTPATIENT)
Dept: LAB | Facility: HOSPITAL | Age: 81
End: 2021-03-06
Attending: STUDENT IN AN ORGANIZED HEALTH CARE EDUCATION/TRAINING PROGRAM
Payer: MEDICARE

## 2021-03-06 LAB
ALBUMIN SERPL-MCNC: 4.2 G/DL (ref 3.4–5)
ALBUMIN/GLOB SERPL: 1.4 {RATIO} (ref 1–2)
ALP LIVER SERPL-CCNC: 96 U/L
ALT SERPL-CCNC: 33 U/L
ANION GAP SERPL CALC-SCNC: 2 MMOL/L (ref 0–18)
AST SERPL-CCNC: 23 U/L (ref 15–37)
BILIRUB SERPL-MCNC: 0.6 MG/DL (ref 0.1–2)
BUN BLD-MCNC: 11 MG/DL (ref 7–18)
BUN/CREAT SERPL: 11 (ref 10–20)
CALCIUM BLD-MCNC: 8.8 MG/DL (ref 8.5–10.1)
CHLORIDE SERPL-SCNC: 110 MMOL/L (ref 98–112)
CHOLEST SMN-MCNC: 128 MG/DL (ref ?–200)
CO2 SERPL-SCNC: 30 MMOL/L (ref 21–32)
CREAT BLD-MCNC: 1 MG/DL
GLOBULIN PLAS-MCNC: 3 G/DL (ref 2.8–4.4)
GLUCOSE BLD-MCNC: 155 MG/DL (ref 70–99)
HDLC SERPL-MCNC: 51 MG/DL (ref 40–59)
LDLC SERPL CALC-MCNC: 45 MG/DL (ref ?–100)
M PROTEIN MFR SERPL ELPH: 7.2 G/DL (ref 6.4–8.2)
NONHDLC SERPL-MCNC: 77 MG/DL (ref ?–130)
OSMOLALITY SERPL CALC.SUM OF ELEC: 297 MOSM/KG (ref 275–295)
PATIENT FASTING Y/N/NP: YES
PATIENT FASTING Y/N/NP: YES
POTASSIUM SERPL-SCNC: 3.9 MMOL/L (ref 3.5–5.1)
SODIUM SERPL-SCNC: 142 MMOL/L (ref 136–145)
TRIGL SERPL-MCNC: 160 MG/DL (ref 30–149)
VLDLC SERPL CALC-MCNC: 32 MG/DL (ref 0–30)

## 2021-03-06 PROCEDURE — 80061 LIPID PANEL: CPT | Performed by: STUDENT IN AN ORGANIZED HEALTH CARE EDUCATION/TRAINING PROGRAM

## 2021-03-06 PROCEDURE — 80053 COMPREHEN METABOLIC PANEL: CPT | Performed by: STUDENT IN AN ORGANIZED HEALTH CARE EDUCATION/TRAINING PROGRAM

## 2021-03-06 PROCEDURE — 36415 COLL VENOUS BLD VENIPUNCTURE: CPT | Performed by: STUDENT IN AN ORGANIZED HEALTH CARE EDUCATION/TRAINING PROGRAM

## 2021-03-06 RX ORDER — METFORMIN HYDROCHLORIDE 500 MG/1
TABLET, EXTENDED RELEASE ORAL
Qty: 180 TABLET | Refills: 0 | Status: SHIPPED | OUTPATIENT
Start: 2021-03-06 | End: 2021-03-24

## 2021-03-07 ENCOUNTER — IMMUNIZATION (OUTPATIENT)
Dept: LAB | Age: 81
End: 2021-03-07
Attending: HOSPITALIST
Payer: MEDICARE

## 2021-03-07 DIAGNOSIS — Z23 NEED FOR VACCINATION: Primary | ICD-10-CM

## 2021-03-07 PROCEDURE — 0001A SARSCOV2 VAC 30MCG/0.3ML IM: CPT

## 2021-03-18 ENCOUNTER — TELEPHONE (OUTPATIENT)
Dept: CASE MANAGEMENT | Age: 81
End: 2021-03-18

## 2021-03-18 NOTE — TELEPHONE ENCOUNTER
Patient is eligible for a 2021 Medicare Annual Wellness visit. Per HIPPA consent discussed w/daughter Nilson Ardon. Appt scheduled for 3/24/21.

## 2021-03-24 ENCOUNTER — OFFICE VISIT (OUTPATIENT)
Dept: FAMILY MEDICINE CLINIC | Facility: CLINIC | Age: 81
End: 2021-03-24
Payer: COMMERCIAL

## 2021-03-24 VITALS
HEIGHT: 58 IN | SYSTOLIC BLOOD PRESSURE: 133 MMHG | DIASTOLIC BLOOD PRESSURE: 65 MMHG | HEART RATE: 55 BPM | OXYGEN SATURATION: 97 % | BODY MASS INDEX: 24.98 KG/M2 | WEIGHT: 119 LBS

## 2021-03-24 DIAGNOSIS — Z86.73 HISTORY OF CVA (CEREBROVASCULAR ACCIDENT): ICD-10-CM

## 2021-03-24 DIAGNOSIS — R13.10 DYSPHAGIA, UNSPECIFIED TYPE: ICD-10-CM

## 2021-03-24 DIAGNOSIS — M15.9 PRIMARY OSTEOARTHRITIS INVOLVING MULTIPLE JOINTS: ICD-10-CM

## 2021-03-24 DIAGNOSIS — I10 ESSENTIAL HYPERTENSION WITH GOAL BLOOD PRESSURE LESS THAN 140/90: ICD-10-CM

## 2021-03-24 DIAGNOSIS — M54.42 CHRONIC BILATERAL LOW BACK PAIN WITH BILATERAL SCIATICA: ICD-10-CM

## 2021-03-24 DIAGNOSIS — G89.29 CHRONIC BILATERAL LOW BACK PAIN WITH BILATERAL SCIATICA: ICD-10-CM

## 2021-03-24 DIAGNOSIS — Z71.89 ADVANCE DIRECTIVE DISCUSSED WITH PATIENT: ICD-10-CM

## 2021-03-24 DIAGNOSIS — Z23 NEED FOR SHINGLES VACCINE: ICD-10-CM

## 2021-03-24 DIAGNOSIS — M54.41 CHRONIC BILATERAL LOW BACK PAIN WITH BILATERAL SCIATICA: ICD-10-CM

## 2021-03-24 DIAGNOSIS — Z12.31 ENCOUNTER FOR SCREENING MAMMOGRAM FOR BREAST CANCER: ICD-10-CM

## 2021-03-24 DIAGNOSIS — E78.00 HYPERCHOLESTEREMIA: ICD-10-CM

## 2021-03-24 DIAGNOSIS — F03.90 DEMENTIA WITHOUT BEHAVIORAL DISTURBANCE, UNSPECIFIED DEMENTIA TYPE (HCC): ICD-10-CM

## 2021-03-24 DIAGNOSIS — Z91.81 RISK FOR FALLS: ICD-10-CM

## 2021-03-24 DIAGNOSIS — E11.65 UNCONTROLLED TYPE 2 DIABETES MELLITUS WITH HYPERGLYCEMIA (HCC): ICD-10-CM

## 2021-03-24 DIAGNOSIS — F33.0 MILD RECURRENT MAJOR DEPRESSION (HCC): ICD-10-CM

## 2021-03-24 DIAGNOSIS — M81.0 OSTEOPOROSIS, UNSPECIFIED OSTEOPOROSIS TYPE, UNSPECIFIED PATHOLOGICAL FRACTURE PRESENCE: ICD-10-CM

## 2021-03-24 DIAGNOSIS — N18.31 STAGE 3A CHRONIC KIDNEY DISEASE (HCC): ICD-10-CM

## 2021-03-24 DIAGNOSIS — Z00.00 ENCOUNTER FOR ANNUAL HEALTH EXAMINATION: Primary | ICD-10-CM

## 2021-03-24 PROBLEM — M46.96 UNSPECIFIED INFLAMMATORY SPONDYLOPATHY, LUMBAR REGION (HCC): Status: RESOLVED | Noted: 2021-02-12 | Resolved: 2021-03-24

## 2021-03-24 PROBLEM — M46.96 UNSPECIFIED INFLAMMATORY SPONDYLOPATHY, LUMBAR REGION: Status: RESOLVED | Noted: 2021-02-12 | Resolved: 2021-03-24

## 2021-03-24 PROCEDURE — 99397 PER PM REEVAL EST PAT 65+ YR: CPT | Performed by: FAMILY MEDICINE

## 2021-03-24 PROCEDURE — 96160 PT-FOCUSED HLTH RISK ASSMT: CPT | Performed by: FAMILY MEDICINE

## 2021-03-24 PROCEDURE — 3075F SYST BP GE 130 - 139MM HG: CPT | Performed by: FAMILY MEDICINE

## 2021-03-24 PROCEDURE — 3078F DIAST BP <80 MM HG: CPT | Performed by: FAMILY MEDICINE

## 2021-03-24 PROCEDURE — G0439 PPPS, SUBSEQ VISIT: HCPCS | Performed by: FAMILY MEDICINE

## 2021-03-24 PROCEDURE — 3008F BODY MASS INDEX DOCD: CPT | Performed by: FAMILY MEDICINE

## 2021-03-24 RX ORDER — METOPROLOL TARTRATE 50 MG/1
50 TABLET, FILM COATED ORAL 2 TIMES DAILY
Qty: 180 TABLET | Refills: 3 | Status: SHIPPED | OUTPATIENT
Start: 2021-03-24 | End: 2022-01-27

## 2021-03-24 RX ORDER — PAROXETINE 10 MG/1
10 TABLET, FILM COATED ORAL DAILY
Qty: 90 TABLET | Refills: 0 | Status: SHIPPED | OUTPATIENT
Start: 2021-03-24 | End: 2021-05-20

## 2021-03-24 RX ORDER — METFORMIN HYDROCHLORIDE 500 MG/1
500 TABLET, EXTENDED RELEASE ORAL
Qty: 90 TABLET | Refills: 1 | Status: SHIPPED | OUTPATIENT
Start: 2021-03-24 | End: 2021-08-04

## 2021-03-24 NOTE — PATIENT INSTRUCTIONS
Madelaine Ferguson's SCREENING SCHEDULE   Tests on this list are recommended by your physician but may not be covered, or covered at this frequency, by your insurer. Please check with your insurance carrier before scheduling to verify coverage.    PREVENTATIVE Covered every 10 years- more often if abnormal Colonoscopy due on 07/20/2022 Update Health Maintenance if applicable    Flex Sigmoidoscopy Screen  Covered every 5 years No results found for this or any previous visit. No flowsheet data found.      Fecal O after 65 Orders placed or performed in visit on 04/06/16   • PNEUMOCOCCAL VACC, 13 ABILIO IM    Please get once after your 65th birthday    Pneumococcal 23 (Pneumovax)  Covered Once after 65 No orders found for this or any previous visit.  Please get once afte

## 2021-03-24 NOTE — PROGRESS NOTES
HPI:   Lore Guallpa is a 79year old female who presents for a MA (Medicare Advantage) Supervisit (Once per calendar year).     Doing well  Here with daughter  Annual Physical due on 03/24/2022        Fall/Risk Assessment   She has been screened for Fall advance directives standard forms performed Face to Face with patient and Family/surrogate (if present), and forms available to patient in AVS     She does NOT have a Power of  for [employer] on file in [facility].    Advance care planning including the Lab Results   Component Value Date    WBC 4.9 02/12/2021    HGB 13.4 02/12/2021    .0 (L) 02/12/2021        ALLERGIES:   She is allergic to lisinopril.     CURRENT MEDICATIONS:   Metoprolol Tartrate 50 MG Oral Tab, Take 1 tablet (50 mg total) by mo Hypertension, Syncope (2009), Type II or unspecified type diabetes mellitus without mention of complication, not stated as uncontrolled, and Unspecified essential hypertension. She  has a past surgical history that includes hysterectomy (1980);  Cataract the speaker in a large room such as at a meeting or place of [employer]ian[employer]: No   Many people I talk to seem to mumble (or don't speak clearly): No People get annoyed because I misunderstand what they say: No   I misunderstand what others are saying and make inap Immunization History   Administered Date(s) Administered   • Covid-19 Vaccine Pfizer 30 mcg/0.3 ml 03/07/2021   • FLU VAC High Dose 65 YRS & Older PRSV Free (78442) 12/14/2019, 10/20/2020   • FLUAD High Dose 65 yr and older (37292) 09/27/2017, 11/21/20 SCREENING BILAT (CPT=77067/46863); Future    Advance directive discussed with patient       1. Encounter for annual health examination      2. Hypercholesteremia  Controlled  Continue present management     3.  Essential hypertension with goal blood pressur exercise. Prescription medication ordered. Imaging studies ordered. Consult ordered. Continue with current treatment plan. Return in 3 months (on 6/24/2021) for Chronic problems/ dm . Rebecca Patel MD, 3/24/2021     General Health     In [facility] risk There are no preventive care reminders to display for this patient. Update Health Maintenance if applicable    Chlamydia  Annually if high risk No results found for: CHLAMYDIA No flowsheet data found.     Screening Mammogram      Mammogram Annually to data found. Diabetes      HgbA1C  Annually HgbA1C (%)   Date Value   02/12/2021 7.5 (H)       No flowsheet data found.     Creat/alb ratio  Annually Malb/Cre Calc (ug/mg)   Date Value   02/12/2021 15.1        LDL  Annually LDL Cholesterol (mg/dL)   Da

## 2021-03-28 ENCOUNTER — IMMUNIZATION (OUTPATIENT)
Dept: LAB | Age: 81
End: 2021-03-28
Attending: HOSPITALIST
Payer: MEDICARE

## 2021-03-28 DIAGNOSIS — Z23 NEED FOR VACCINATION: Primary | ICD-10-CM

## 2021-03-28 PROCEDURE — 0002A SARSCOV2 VAC 30MCG/0.3ML IM: CPT

## 2021-04-18 ENCOUNTER — PATIENT MESSAGE (OUTPATIENT)
Dept: FAMILY MEDICINE CLINIC | Facility: CLINIC | Age: 81
End: 2021-04-18

## 2021-04-19 ENCOUNTER — NURSE TRIAGE (OUTPATIENT)
Dept: FAMILY MEDICINE CLINIC | Facility: CLINIC | Age: 81
End: 2021-04-19

## 2021-04-19 RX ORDER — DIPHENHYDRAMINE HYDROCHLORIDE 25 MG/1
CAPSULE, LIQUID FILLED ORAL
Qty: 1 KIT | Refills: 0 | Status: SHIPPED | OUTPATIENT
Start: 2021-04-19

## 2021-04-19 RX ORDER — GLUCOSAMINE HCL/CHONDROITIN SU 500-400 MG
CAPSULE ORAL
Qty: 200 STRIP | Refills: 3 | Status: SHIPPED | OUTPATIENT
Start: 2021-04-19

## 2021-04-19 NOTE — TELEPHONE ENCOUNTER
Action Requested: Summary for Provider     []  Critical Lab, Recommendations Needed  [x] Need Additional Advice  []   FYI    []   Need Orders  [] Need Medications Sent to Pharmacy  []  Other     SUMMARY:   Spoke with pt's daughter per ARLETTE,  verified, sh

## 2021-04-19 NOTE — TELEPHONE ENCOUNTER
From: Jina Fletcher  To: [address] M. Remonia Kehr, MD  Sent: 4/18/2021 3:15 PM CDT  Subject: Prescription Question    Hello dr, can you send a prescription for a new diabetic testing machine . Mine is broken. Thanks!  To Ronny in [facility]

## 2021-04-19 NOTE — TELEPHONE ENCOUNTER
From: Jina Fletcher  To: [address] M. Remonia Kehr, MD  Sent: 4/18/2021 3:16 PM CDT  Subject: Prescription Question    Hello dr Johnson ,   Mom needs a new diabetic testing machine. The one she has is broken. Thanks!

## 2021-04-19 NOTE — TELEPHONE ENCOUNTER
Daughter Perri Strauss on [employer] was called and informed of  message below and she verbalized understanding. She will give  a call.

## 2021-04-19 NOTE — TELEPHONE ENCOUNTER
From: Preston Mora  To: [address] Maynard MD  Sent: 4/18/2021 10:26 PM CDT  Subject: Other    Hello dr Alejandro King,   I'm concerned about mom . I think she might be having hallucinations. She told me yesterday some woman come visit her at night.  Can I please schedul

## 2021-04-22 ENCOUNTER — HOSPITAL ENCOUNTER (OUTPATIENT)
Dept: BONE DENSITY | Facility: HOSPITAL | Age: 81
Discharge: HOME | End: 2021-04-22
Attending: FAMILY MEDICINE
Payer: MEDICARE

## 2021-04-22 DIAGNOSIS — M81.0 OSTEOPOROSIS, UNSPECIFIED OSTEOPOROSIS TYPE, UNSPECIFIED PATHOLOGICAL FRACTURE PRESENCE: ICD-10-CM

## 2021-04-22 PROCEDURE — 77080 DXA BONE DENSITY AXIAL: CPT | Performed by: FAMILY MEDICINE

## 2021-04-27 ENCOUNTER — TELEMEDICINE (OUTPATIENT)
Dept: FAMILY MEDICINE CLINIC | Facility: CLINIC | Age: 81
End: 2021-04-27

## 2021-04-27 DIAGNOSIS — R44.1 VISUAL HALLUCINATIONS: Primary | ICD-10-CM

## 2021-04-27 DIAGNOSIS — F03.90 DEMENTIA WITHOUT BEHAVIORAL DISTURBANCE, UNSPECIFIED DEMENTIA TYPE (HCC): ICD-10-CM

## 2021-04-27 DIAGNOSIS — E11.65 UNCONTROLLED TYPE 2 DIABETES MELLITUS WITH HYPERGLYCEMIA (HCC): ICD-10-CM

## 2021-04-27 DIAGNOSIS — M81.0 OSTEOPOROSIS, UNSPECIFIED OSTEOPOROSIS TYPE, UNSPECIFIED PATHOLOGICAL FRACTURE PRESENCE: ICD-10-CM

## 2021-04-27 PROCEDURE — 99214 OFFICE O/P EST MOD 30 MIN: CPT | Performed by: FAMILY MEDICINE

## 2021-04-27 NOTE — PROGRESS NOTES
This visit is conducted using Telemedicine with live, interactive video and audio during this Coronavirus pandemic. Please note that the following visit was completed using two-way, real-time interactive audio and/or video communication.   This has been worse, no confusion, not agitated, she is eating good. She is wondering if they can get is sooner appt this week for video visit? pls advise, thanks in advance.         Patient denies any urinary symptoms. She denies any fever or chills.   She has been Monitoring Suppl (BLOOD GLUCOSE MONITOR SYSTEM) w/Device Does not apply Kit, Use as directed, Disp: 1 kit, Rfl: 0  •  Glucose Blood (BLOOD GLUCOSE TEST) In Vitro Strip, TEST BID UTD, Disp: 200 strip, Rfl: 3  •  Lancets Misc.  Does not apply Misc, TEST BID U ALLERGY LIST    Lisinopril              Coughing    Allergies:  Lisinopril              Coughing    PHYSICAL EXAM:     Vitals signs taken at home if available: There were no vitals taken for this visit.       Limited examination for this telephone/ v understanding of plan. Patient reminded to practice good health and safety measures including washing hands, social distancing, covering mouth when coughing/ sneezing, avoid social meetings/ gatherings in face of this Covid 19 pandemic.     Patient v

## 2021-05-11 ENCOUNTER — OFFICE VISIT (OUTPATIENT)
Dept: OPHTHALMOLOGY | Facility: CLINIC | Age: 81
End: 2021-05-11
Payer: COMMERCIAL

## 2021-05-11 DIAGNOSIS — E11.9 TYPE 2 DIABETES MELLITUS WITHOUT RETINOPATHY (HCC): Primary | ICD-10-CM

## 2021-05-11 DIAGNOSIS — H43.391 FLOATER, VITREOUS, RIGHT: ICD-10-CM

## 2021-05-11 DIAGNOSIS — Z96.1 PSEUDOPHAKIA OF BOTH EYES: ICD-10-CM

## 2021-05-11 PROCEDURE — 92015 DETERMINE REFRACTIVE STATE: CPT | Performed by: OPHTHALMOLOGY

## 2021-05-11 PROCEDURE — 92014 COMPRE OPH EXAM EST PT 1/>: CPT | Performed by: OPHTHALMOLOGY

## 2021-05-11 NOTE — PROGRESS NOTES
Jina Fletcher is a [de-identified]year old female.     HPI:     HPI     Diabetic Eye Exam      Additional comments: Pt has been a diabetic for 4-5 years       Pt's diabetes is currently controlled by pills  Pt checks BS 2-3x a week   Pt's last blood sugar was 120  Last Drug use: No      Medications:  Current Outpatient Medications   Medication Sig Dispense Refill   • Blood Glucose Monitoring Suppl (BLOOD GLUCOSE MONITOR SYSTEM) w/Device Does not apply Kit Use as directed 1 kit 0   • Glucose Blood (BLOOD GLUCOSE TEST) In Gastrointestinal, Neurological, Skin, Genitourinary, Musculoskeletal, HENT, Cardiovascular, Respiratory, Psychiatric, Allergic/Imm, Heme/Lymph    Last edited by Sarah Joaquin O.T. on 5/11/2021  9:58 AM. (History)          PHYSICAL EXAM:     Base Eye Exa type II: no background of retinopathy, no signs of neovascularization noted. Discussed ocular and systemic benefits of blood sugar control. Diagnosis and treatment discussed in detail with patient. Pseudophakia of both eyes  No treatment.    New glas

## 2021-05-11 NOTE — PATIENT INSTRUCTIONS
Type 2 diabetes mellitus without retinopathy ([facility] [address]  Diabetes type II: no background of retinopathy, no signs of neovascularization noted. Discussed ocular and systemic benefits of blood sugar control.   Diagnosis and treatment discussed in detail with jj

## 2021-05-18 ENCOUNTER — OFFICE VISIT (OUTPATIENT)
Dept: NEUROLOGY | Facility: CLINIC | Age: 81
End: 2021-05-18
Payer: COMMERCIAL

## 2021-05-18 ENCOUNTER — LAB ENCOUNTER (OUTPATIENT)
Dept: LAB | Facility: HOSPITAL | Age: 81
End: 2021-05-18
Attending: Other
Payer: MEDICARE

## 2021-05-18 ENCOUNTER — TELEPHONE (OUTPATIENT)
Dept: NEUROLOGY | Facility: CLINIC | Age: 81
End: 2021-05-18

## 2021-05-18 VITALS
HEART RATE: 64 BPM | BODY MASS INDEX: 25.67 KG/M2 | DIASTOLIC BLOOD PRESSURE: 62 MMHG | SYSTOLIC BLOOD PRESSURE: 122 MMHG | HEIGHT: 57 IN | WEIGHT: 119 LBS

## 2021-05-18 DIAGNOSIS — I63.10 CEREBROVASCULAR ACCIDENT (CVA) DUE TO EMBOLISM OF PRECEREBRAL ARTERY (HCC): Primary | ICD-10-CM

## 2021-05-18 DIAGNOSIS — R51.9 NEW ONSET OF HEADACHES: ICD-10-CM

## 2021-05-18 DIAGNOSIS — F03.91 DEMENTIA WITH BEHAVIORAL DISTURBANCE, UNSPECIFIED DEMENTIA TYPE (HCC): ICD-10-CM

## 2021-05-18 DIAGNOSIS — F29 PSYCHOSIS, UNSPECIFIED PSYCHOSIS TYPE (HCC): ICD-10-CM

## 2021-05-18 PROCEDURE — 84443 ASSAY THYROID STIM HORMONE: CPT | Performed by: OTHER

## 2021-05-18 PROCEDURE — 3008F BODY MASS INDEX DOCD: CPT | Performed by: OTHER

## 2021-05-18 PROCEDURE — 36415 COLL VENOUS BLD VENIPUNCTURE: CPT | Performed by: OTHER

## 2021-05-18 PROCEDURE — 82607 VITAMIN B-12: CPT | Performed by: OTHER

## 2021-05-18 PROCEDURE — 3078F DIAST BP <80 MM HG: CPT | Performed by: OTHER

## 2021-05-18 PROCEDURE — 99214 OFFICE O/P EST MOD 30 MIN: CPT | Performed by: OTHER

## 2021-05-18 PROCEDURE — 3074F SYST BP LT 130 MM HG: CPT | Performed by: OTHER

## 2021-05-18 NOTE — TELEPHONE ENCOUNTER
This [employer] Medicare Advantage members plan does not currently require a prior authorization for these services  MRI brain w/wo cpt code 74470 Decision ID #:[id number]. L/m on Sonia's v/m advising of above. Can proceed with scheduling appt.

## 2021-05-18 NOTE — PROGRESS NOTES
Neurology follow-up visit     Referred By:  ref. provider found    Chief Complaint: Patient presents with:  Neurologic Problem: LOV: 6/4/19. F/U on memory loss.  Patient presents today with daughter who states that she has been having hallucinations back for follow-up with her daughter in May 2021. At that point lot of hallucinations were described, form visual hallucinations. She would see people, sometimes she would talk to them, sometimes she would see scale comes.   She is not very scared of them Strip, TEST BID UTD, Disp: 200 strip, Rfl: 3  •  Lancets Misc. Does not apply Misc, TEST BID UTD, Disp: 200 lancet, Rfl: 3  •  Metoprolol Tartrate 50 MG Oral Tab, Take 1 tablet (50 mg total) by mouth 2 (two) times daily. , Disp: 180 tablet, Rfl: 3  •  PARox 122/62   Pulse: 64   Weight: 119 lb (54 kg)   Height: 57\"       General: No apparent distress, well nourished, well groomed.   Head- Normocephalic, atraumatic  Eyes- No redness or swelling  ENT- Hearing intake, smell preserved, normal glutition  Neck- No m (L) 02/12/2021      Lab Results   Component Value Date    BUN 11 03/06/2021    CA 8.8 03/06/2021    ALT 33 03/06/2021    AST 23 03/06/2021    ALKPHOS 76 01/09/2016    ALB 4.2 03/06/2021     03/06/2021    K 3.9 03/06/2021     03/06/2021    CO2 3

## 2021-05-19 ENCOUNTER — TELEPHONE (OUTPATIENT)
Dept: NEUROLOGY | Facility: CLINIC | Age: 81
End: 2021-05-19

## 2021-05-19 DIAGNOSIS — F33.0 MILD RECURRENT MAJOR DEPRESSION (HCC): ICD-10-CM

## 2021-05-19 NOTE — TELEPHONE ENCOUNTER
----- Message from Justo Chacon MD sent at 5/19/2021  7:24 AM CDT -----  Please let the patient know that results of these particular lab tests so far were normal.    Thank you

## 2021-05-19 NOTE — TELEPHONE ENCOUNTER
Left detailed message for patient's daughter notifying her of below. Asked her to call the office if she had questions.

## 2021-05-20 RX ORDER — PAROXETINE 10 MG/1
TABLET, FILM COATED ORAL
Qty: 90 TABLET | Refills: 3 | Status: SHIPPED | OUTPATIENT
Start: 2021-05-20

## 2021-06-16 ENCOUNTER — HOSPITAL ENCOUNTER (OUTPATIENT)
Dept: MRI IMAGING | Facility: HOSPITAL | Age: 81
Discharge: HOME | End: 2021-06-16
Attending: Other
Payer: MEDICARE

## 2021-06-16 ENCOUNTER — TELEPHONE (OUTPATIENT)
Dept: NEUROLOGY | Facility: CLINIC | Age: 81
End: 2021-06-16

## 2021-06-16 DIAGNOSIS — F29 PSYCHOSIS, UNSPECIFIED PSYCHOSIS TYPE (HCC): ICD-10-CM

## 2021-06-16 DIAGNOSIS — R51.9 NEW ONSET OF HEADACHES: ICD-10-CM

## 2021-06-16 DIAGNOSIS — I63.10 CEREBROVASCULAR ACCIDENT (CVA) DUE TO EMBOLISM OF PRECEREBRAL ARTERY (HCC): ICD-10-CM

## 2021-06-16 PROCEDURE — 70553 MRI BRAIN STEM W/O & W/DYE: CPT | Performed by: OTHER

## 2021-06-16 PROCEDURE — 82565 ASSAY OF CREATININE: CPT

## 2021-06-16 PROCEDURE — A9575 INJ GADOTERATE MEGLUMI 0.1ML: HCPCS | Performed by: OTHER

## 2021-06-21 RX ORDER — ATORVASTATIN CALCIUM 80 MG/1
TABLET, FILM COATED ORAL
Qty: 90 TABLET | Refills: 3 | Status: SHIPPED | OUTPATIENT
Start: 2021-06-21

## 2021-06-21 RX ORDER — ALENDRONATE SODIUM 70 MG/1
TABLET ORAL
Qty: 12 TABLET | Refills: 3 | Status: SHIPPED | OUTPATIENT
Start: 2021-06-21

## 2021-06-24 ENCOUNTER — HOSPITAL ENCOUNTER (OUTPATIENT)
Dept: MAMMOGRAPHY | Facility: HOSPITAL | Age: 81
Discharge: HOME | End: 2021-06-24
Attending: FAMILY MEDICINE
Payer: MEDICARE

## 2021-06-24 DIAGNOSIS — Z12.31 ENCOUNTER FOR SCREENING MAMMOGRAM FOR BREAST CANCER: ICD-10-CM

## 2021-06-24 PROCEDURE — 77063 BREAST TOMOSYNTHESIS BI: CPT | Performed by: FAMILY MEDICINE

## 2021-06-24 PROCEDURE — 77067 SCR MAMMO BI INCL CAD: CPT | Performed by: FAMILY MEDICINE

## 2021-07-13 ENCOUNTER — OFFICE VISIT (OUTPATIENT)
Dept: NEUROLOGY | Facility: CLINIC | Age: 81
End: 2021-07-13
Payer: COMMERCIAL

## 2021-07-13 ENCOUNTER — TELEPHONE (OUTPATIENT)
Dept: NEUROLOGY | Facility: CLINIC | Age: 81
End: 2021-07-13

## 2021-07-13 VITALS
DIASTOLIC BLOOD PRESSURE: 50 MMHG | BODY MASS INDEX: 25.67 KG/M2 | HEART RATE: 57 BPM | HEIGHT: 57 IN | SYSTOLIC BLOOD PRESSURE: 122 MMHG | WEIGHT: 119 LBS

## 2021-07-13 DIAGNOSIS — I63.10 CEREBROVASCULAR ACCIDENT (CVA) DUE TO EMBOLISM OF PRECEREBRAL ARTERY (HCC): Primary | ICD-10-CM

## 2021-07-13 DIAGNOSIS — F03.91 DEMENTIA WITH BEHAVIORAL DISTURBANCE, UNSPECIFIED DEMENTIA TYPE (HCC): ICD-10-CM

## 2021-07-13 PROCEDURE — 3078F DIAST BP <80 MM HG: CPT | Performed by: OTHER

## 2021-07-13 PROCEDURE — 3008F BODY MASS INDEX DOCD: CPT | Performed by: OTHER

## 2021-07-13 PROCEDURE — 3074F SYST BP LT 130 MM HG: CPT | Performed by: OTHER

## 2021-07-13 PROCEDURE — 99214 OFFICE O/P EST MOD 30 MIN: CPT | Performed by: OTHER

## 2021-07-13 NOTE — TELEPHONE ENCOUNTER
Called Dr. Mc [facility] office & left message requesting Neuropsych testing to be faxed to [phone], will try back later.

## 2021-07-13 NOTE — PROGRESS NOTES
Neurology follow-up visit     Referred By:  ref. provider found    Chief Complaint: Patient presents with:  CVA: 5/18/21 CVA/MEMORY f/u- Patient is present with her daughter as .  Patient's daughter states the patient is still experiencin form visual hallucinations. She would see people, sometimes she would talk to them, sometimes she would see scale comes. She is not very scared of them. She is not very bothered by them.   Possibly some progression of cognitive decline both was not very WATER 30 MINUTES BEFORE  FIRST FOOD, DRINK OR MEDS.   STAY UPRIGHT FOR 30 MINS, Disp: 12 tablet, Rfl: 3  •  ATORVASTATIN 80 MG Oral Tab, TAKE 1 TABLET PER G TUBE  ROUTE NIGHTLY, Disp: 90 tablet, Rfl: 3  •  PAROXETINE HCL 10 MG Oral Tab, TAKE 1 TABLET BY CATHERINE system review was done and was negative      Physical Exam:  There were no vitals filed for this visit. General: No apparent distress, well nourished, well groomed.   Head- Normocephalic, atraumatic  Eyes- No redness or swelling  ENT- Hearing intake, sme 02/12/2021    WBC 4.9 02/12/2021    .0 (L) 02/12/2021      Lab Results   Component Value Date    BUN 11 03/06/2021    CA 8.8 03/06/2021    ALT 33 03/06/2021    AST 23 03/06/2021    ALKPHOS 76 01/09/2016    ALB 4.2 03/06/2021     03/06/2021

## 2021-07-14 NOTE — TELEPHONE ENCOUNTER
Called & spoke with . Dr. Irineo uTrner stated the reason we do not have neuropsych testing is due to patient not bringing referral. Dr. Irineo Turner will mail Neuropsych testing to our office. Dr. Irineo Turner would like us to mail referral to her office.

## 2021-07-28 ENCOUNTER — OFFICE VISIT (OUTPATIENT)
Dept: NEUROLOGY | Facility: CLINIC | Age: 81
End: 2021-07-28
Payer: COMMERCIAL

## 2021-07-28 DIAGNOSIS — F03.91 DEMENTIA WITH BEHAVIORAL DISTURBANCE, UNSPECIFIED DEMENTIA TYPE (HCC): ICD-10-CM

## 2021-07-28 DIAGNOSIS — I63.10 CEREBROVASCULAR ACCIDENT (CVA) DUE TO EMBOLISM OF PRECEREBRAL ARTERY (HCC): Primary | ICD-10-CM

## 2021-07-28 PROCEDURE — 99213 OFFICE O/P EST LOW 20 MIN: CPT | Performed by: OTHER

## 2021-07-28 NOTE — PROGRESS NOTES
Neurology follow-up visit     Referred By:  ref. provider found    Chief Complaint: Patient presents with: Other: Neuropsyche testing results review      HPI:     David Hannah is a [de-identified]year old female, who presents for history of stroke.   Apparent progression of cognitive decline both was not very clear. Meantime patient also developing some new onset of headache on top of the head. SH, vitamin B12 were checked as well. The brain was repeated.   Still the same stroke in the left MCA territory, m Disp: 12 tablet, Rfl: 3  •  ATORVASTATIN 80 MG Oral Tab, TAKE 1 TABLET PER G TUBE  ROUTE NIGHTLY, Disp: 90 tablet, Rfl: 3  •  PAROXETINE HCL 10 MG Oral Tab, TAKE 1 TABLET BY MOUTH  DAILY, Disp: 90 tablet, Rfl: 3  •  Blood Glucose Monitoring Suppl (BLOOD GL vitals filed for this visit. General: No apparent distress, well nourished, well groomed.   Head- Normocephalic, atraumatic  Eyes- No redness or swelling  ENT- Hearing intake, smell preserved, normal glutition  Neck- No masses or adenopathy  Cv: pulses w Component Value Date    BUN 11 03/06/2021    CA 8.8 03/06/2021    ALT 33 03/06/2021    AST 23 03/06/2021    ALKPHOS 76 01/09/2016    ALB 4.2 03/06/2021     03/06/2021    K 3.9 03/06/2021     03/06/2021    CO2 30.0 03/06/2021      I have revie No

## 2021-08-03 DIAGNOSIS — E11.65 UNCONTROLLED TYPE 2 DIABETES MELLITUS WITH HYPERGLYCEMIA (HCC): ICD-10-CM

## 2021-08-04 RX ORDER — METFORMIN HYDROCHLORIDE 500 MG/1
500 TABLET, EXTENDED RELEASE ORAL
Qty: 90 TABLET | Refills: 3 | Status: SHIPPED | OUTPATIENT
Start: 2021-08-04 | End: 2021-10-16

## 2021-08-04 NOTE — TELEPHONE ENCOUNTER
Refill passed per [facility], [facility] protocol.      Requested Prescriptions   Pending Prescriptions Disp Refills    METFORMIN HCL  MG Oral Tablet 24 Hr [Pharmacy Med Name: metFORMIN HCl  MG Oral Tablet Extended Release 24 Hour] 90 tablet 3     Sig: TAKE 1 TABLET BY MOUTH  DAILY WITH BREAKFAST        Diabetes Medication Protocol Passed - 8/3/2021  9:04 PM        Passed - Last A1C < 7.5 and within past 6 months     Lab Results   Component Value Date    A1C 7.5 (H) 02/12/2021             Passed - Appointment in past 6 or next 3 months        Passed - GFR Non- > 50     Lab Results   Component Value Date    GFRNAA 70 06/16/2021               Passed - GFR in the past 12 months              [unfilled]      [unfilled]

## 2021-09-16 RX ORDER — AMLODIPINE BESYLATE 5 MG/1
5 TABLET ORAL NIGHTLY
Qty: 90 TABLET | Refills: 3 | Status: SHIPPED | OUTPATIENT
Start: 2021-09-16

## 2021-09-16 NOTE — TELEPHONE ENCOUNTER
Refill passed per [facility], [facility] protocol.   Requested Prescriptions   Pending Prescriptions Disp Refills    AMLODIPINE 5 MG Oral Tab [Pharmacy Med Name: amLODIPine Besylate 5 MG Oral Tablet] 90 tablet 3     Sig: TAKE 1 TABLET PER G TUBE  ROUTE NIGHTLY

## 2021-09-16 NOTE — TELEPHONE ENCOUNTER
Refill passed per [facility], [facility] protocol.   Requested Prescriptions   Pending Prescriptions Disp Refills    AMLODIPINE 5 MG Oral Tab [Pharmacy Med Name: amLODIPine Besylate 5 MG Oral Tablet] 90 tablet 3     Sig: TAKE 1 TABLET PER G TUBE  ROUTE NIGHTLY

## 2021-10-05 ENCOUNTER — LAB ENCOUNTER (OUTPATIENT)
Dept: LAB | Age: 81
End: 2021-10-05
Attending: FAMILY MEDICINE
Payer: MEDICARE

## 2021-10-05 ENCOUNTER — OFFICE VISIT (OUTPATIENT)
Dept: FAMILY MEDICINE CLINIC | Facility: CLINIC | Age: 81
End: 2021-10-05
Payer: COMMERCIAL

## 2021-10-05 VITALS
BODY MASS INDEX: 24 KG/M2 | OXYGEN SATURATION: 98 % | WEIGHT: 113 LBS | DIASTOLIC BLOOD PRESSURE: 76 MMHG | SYSTOLIC BLOOD PRESSURE: 148 MMHG | HEART RATE: 53 BPM

## 2021-10-05 DIAGNOSIS — R19.7 DIARRHEA, UNSPECIFIED TYPE: Primary | ICD-10-CM

## 2021-10-05 DIAGNOSIS — R10.84 ABDOMINAL PAIN, GENERALIZED: Primary | ICD-10-CM

## 2021-10-05 DIAGNOSIS — E11.9 TYPE 2 DIABETES MELLITUS WITHOUT RETINOPATHY (HCC): ICD-10-CM

## 2021-10-05 DIAGNOSIS — R63.0 LOSS OF APPETITE: ICD-10-CM

## 2021-10-05 DIAGNOSIS — I10 ESSENTIAL HYPERTENSION WITH GOAL BLOOD PRESSURE LESS THAN 140/90: ICD-10-CM

## 2021-10-05 DIAGNOSIS — R10.84 GENERALIZED ABDOMINAL PAIN: ICD-10-CM

## 2021-10-05 PROCEDURE — 83036 HEMOGLOBIN GLYCOSYLATED A1C: CPT | Performed by: FAMILY MEDICINE

## 2021-10-05 PROCEDURE — 99214 OFFICE O/P EST MOD 30 MIN: CPT | Performed by: FAMILY MEDICINE

## 2021-10-05 PROCEDURE — 36415 COLL VENOUS BLD VENIPUNCTURE: CPT | Performed by: FAMILY MEDICINE

## 2021-10-05 PROCEDURE — 80053 COMPREHEN METABOLIC PANEL: CPT | Performed by: FAMILY MEDICINE

## 2021-10-05 PROCEDURE — 83690 ASSAY OF LIPASE: CPT | Performed by: FAMILY MEDICINE

## 2021-10-05 PROCEDURE — 3078F DIAST BP <80 MM HG: CPT | Performed by: FAMILY MEDICINE

## 2021-10-05 PROCEDURE — 3077F SYST BP >= 140 MM HG: CPT | Performed by: FAMILY MEDICINE

## 2021-10-05 PROCEDURE — 81002 URINALYSIS NONAUTO W/O SCOPE: CPT | Performed by: FAMILY MEDICINE

## 2021-10-05 PROCEDURE — 85025 COMPLETE CBC W/AUTO DIFF WBC: CPT | Performed by: FAMILY MEDICINE

## 2021-10-05 PROCEDURE — 87086 URINE CULTURE/COLONY COUNT: CPT

## 2021-10-05 NOTE — PROGRESS NOTES
HPI:    Patient ID: Layton Hardin is a [de-identified]year old female.     HPI  Patient presents with:  Stomach Pain: X 1 week   Loss Of Appetite  Diarrhea: son states she should be checked for H  pylori since daughter  has it diagnosed recently     Wt Readings from [facility] ROUTE NIGHTLY 90 tablet 3   • PAROXETINE HCL 10 MG Oral Tab TAKE 1 TABLET BY MOUTH  DAILY 90 tablet 3   • Blood Glucose Monitoring Suppl (BLOOD GLUCOSE MONITOR SYSTEM) w/Device Does not apply Kit Use as directed 1 kit 0   • Glucose Blood (BLOOD GLUCOSE ANTOINE sign.      Comments: Generalized abdominal tenderness. No rebound   Musculoskeletal:      Cervical back: Normal range of motion and neck supple. Neurological:      Mental Status: She is alert.                 ASSESSMENT/PLAN:   Diarrhea, unspecified type

## 2021-10-06 ENCOUNTER — LAB ENCOUNTER (OUTPATIENT)
Dept: LAB | Facility: HOSPITAL | Age: 81
End: 2021-10-06
Attending: FAMILY MEDICINE
Payer: MEDICARE

## 2021-10-06 DIAGNOSIS — R19.7 DIARRHEA, UNSPECIFIED TYPE: Primary | ICD-10-CM

## 2021-10-06 DIAGNOSIS — R10.84 GENERALIZED ABDOMINAL PAIN: ICD-10-CM

## 2021-10-06 PROCEDURE — 87338 HPYLORI STOOL AG IA: CPT

## 2021-10-12 RX ORDER — OMEPRAZOLE 20 MG/1
20 CAPSULE, DELAYED RELEASE ORAL
Qty: 30 CAPSULE | Refills: 0 | Status: SHIPPED | OUTPATIENT
Start: 2021-10-12

## 2021-10-12 RX ORDER — CLARITHROMYCIN 500 MG/1
500 TABLET, COATED ORAL 2 TIMES DAILY
Qty: 28 TABLET | Refills: 0 | Status: SHIPPED | OUTPATIENT
Start: 2021-10-12 | End: 2021-10-26

## 2021-10-12 RX ORDER — AMOXICILLIN 500 MG/1
1000 TABLET, FILM COATED ORAL 2 TIMES DAILY
Qty: 56 TABLET | Refills: 0 | Status: SHIPPED | OUTPATIENT
Start: 2021-10-12 | End: 2021-10-26

## 2021-11-20 RX ORDER — FAMOTIDINE 20 MG/1
20 TABLET ORAL DAILY
Qty: 90 TABLET | Refills: 3 | Status: SHIPPED | OUTPATIENT
Start: 2021-11-20

## 2021-11-20 NOTE — TELEPHONE ENCOUNTER
Please review. Protocol failed / No Protocol.     Requested Prescriptions   Pending Prescriptions Disp Refills    FAMOTIDINE 20 MG Oral Tab [Pharmacy Med Name: Famotidine 20 MG Oral Tablet] 90 tablet 3     Sig: TAKE 1 TABLET BY MOUTH  DAILY        Gastroint

## 2021-11-26 RX ORDER — LOSARTAN POTASSIUM 50 MG/1
50 TABLET ORAL 2 TIMES DAILY
Qty: 180 TABLET | Refills: 1 | Status: SHIPPED | OUTPATIENT
Start: 2021-11-26 | End: 2022-06-16

## 2021-11-27 NOTE — TELEPHONE ENCOUNTER
Refill passed per [facility] protocol.      Requested Prescriptions   Pending Prescriptions Disp Refills    LOSARTAN 48 MG Oral Tab [Pharmacy Med Name: Losartan Potassium 50 MG Oral Tablet] 180 tablet 3     Sig: TAKE 1 TABLET BY MOUTH  TWICE DAILY        Hypertensive Medications Protocol Passed - 11/26/2021  9:49 PM        Passed - CMP or BMP in past 12 months        Passed - Appointment in past 6 or next 3 months        Passed - GFR Non- > 50     Lab Results   Component Value Date    GFRNAA 70 10/05/2021                        Future Appointments         Provider Department Appt Notes    In 1 week [address], [address] [facility] 31 Day [address]             Recent Outpatient Visits              1 month ago Essential hypertension with goal blood pressure less than 140/90    Cardiology - Evie Sánchez, [address] [facility]    Nurse Only    1 month ago Diarrhea, unspecified type    Felipa Romano MD    Office Visit    2 months ago History of CVA (cerebrovascular accident)    Cardiology [address], [address] [facility]    Nurse Only    3 months ago History of CVA (cerebrovascular accident)    Cardiology [address], [address] [facility]    Nurse Only    4 months ago Cerebrovascular accident (CVA) due to embolism of precerebral artery [facility])    [facility] Titus Meneses MD    Office Visit

## 2021-12-28 NOTE — ED NOTES
Blood pressures taken on arms bilaterally:  Right 239/77 Left 221/61 Price (Use Numbers Only, No Special Characters Or $): 1 Detail Level: Detailed

## 2022-01-26 DIAGNOSIS — I10 ESSENTIAL HYPERTENSION WITH GOAL BLOOD PRESSURE LESS THAN 140/90: ICD-10-CM

## 2022-01-27 RX ORDER — METOPROLOL TARTRATE 50 MG/1
50 TABLET, FILM COATED ORAL 2 TIMES DAILY
Qty: 180 TABLET | Refills: 1 | Status: SHIPPED | OUTPATIENT
Start: 2022-01-27 | End: 2022-07-18

## 2022-01-27 NOTE — TELEPHONE ENCOUNTER
Refill passed per [facility] protocol.     Requested Prescriptions   Pending Prescriptions Disp Refills    METOPROLOL TARTRATE 50 MG Oral Tab [Pharmacy Med Name: Metoprolol Tartrate 50 MG Oral Tablet] 180 tablet 3     Sig: TAKE 1 TABLET BY MOUTH  TWICE DAILY        Hypertensive Medications Protocol Passed - 1/26/2022 10:27 PM        Passed - CMP or BMP in past 12 months        Passed - Appointment in past 6 or next 3 months        Passed - GFR Non- > 50     Lab Results   Component Value Date    GFRNAA 70 10/05/2021                       Recent Outpatient Visits              3 weeks ago History of CVA (cerebrovascular accident)    Cardiology - [address]    Nurse Only    1 month ago History of CVA (cerebrovascular accident)    Cardiology [address]    Nurse Only    3 months ago Essential hypertension with goal blood pressure less than 140/90    Cardiology [address]    Nurse Only    3 months ago Diarrhea, unspecified type    Prisca Doss MD    Office Visit    4 months ago History of CVA (cerebrovascular accident)    Cardiology [address]    Nurse Only            Future Appointments         Provider Department Appt Notes    In 1 week [address] 31 day Linq summary remote

## 2022-01-28 ENCOUNTER — TELEPHONE (OUTPATIENT)
Dept: CASE MANAGEMENT | Age: 82
End: 2022-01-28

## 2022-01-28 NOTE — TELEPHONE ENCOUNTER
Patient is eligible for a 2022 Medicare Annual Wellness visit. This visit can be scheduled any time in the calendar year. Daughter requests a call back after February 5th.

## 2022-03-08 ENCOUNTER — OFFICE VISIT (OUTPATIENT)
Dept: FAMILY MEDICINE CLINIC | Facility: CLINIC | Age: 82
End: 2022-03-08
Payer: COMMERCIAL

## 2022-03-08 VITALS
HEIGHT: 57 IN | SYSTOLIC BLOOD PRESSURE: 121 MMHG | DIASTOLIC BLOOD PRESSURE: 66 MMHG | HEART RATE: 57 BPM | BODY MASS INDEX: 23.95 KG/M2 | TEMPERATURE: 97 F | WEIGHT: 111 LBS

## 2022-03-08 DIAGNOSIS — M81.0 OSTEOPOROSIS, UNSPECIFIED OSTEOPOROSIS TYPE, UNSPECIFIED PATHOLOGICAL FRACTURE PRESENCE: ICD-10-CM

## 2022-03-08 DIAGNOSIS — Z23 NEED FOR SHINGLES VACCINE: ICD-10-CM

## 2022-03-08 DIAGNOSIS — F03.90 DEMENTIA WITHOUT BEHAVIORAL DISTURBANCE, UNSPECIFIED DEMENTIA TYPE (HCC): ICD-10-CM

## 2022-03-08 DIAGNOSIS — Z91.81 RISK FOR FALLS: ICD-10-CM

## 2022-03-08 DIAGNOSIS — E11.9 TYPE 2 DIABETES MELLITUS WITHOUT RETINOPATHY (HCC): ICD-10-CM

## 2022-03-08 DIAGNOSIS — Z12.11 COLON CANCER SCREENING: ICD-10-CM

## 2022-03-08 DIAGNOSIS — Z00.00 NORMAL BREAST EXAM: ICD-10-CM

## 2022-03-08 DIAGNOSIS — M54.41 CHRONIC BILATERAL LOW BACK PAIN WITH BILATERAL SCIATICA: ICD-10-CM

## 2022-03-08 DIAGNOSIS — I10 ESSENTIAL HYPERTENSION WITH GOAL BLOOD PRESSURE LESS THAN 140/90: ICD-10-CM

## 2022-03-08 DIAGNOSIS — G89.29 CHRONIC BILATERAL LOW BACK PAIN WITH BILATERAL SCIATICA: ICD-10-CM

## 2022-03-08 DIAGNOSIS — E78.00 HYPERCHOLESTEREMIA: ICD-10-CM

## 2022-03-08 DIAGNOSIS — Z86.73 HISTORY OF CVA (CEREBROVASCULAR ACCIDENT): ICD-10-CM

## 2022-03-08 DIAGNOSIS — M54.42 CHRONIC BILATERAL LOW BACK PAIN WITH BILATERAL SCIATICA: ICD-10-CM

## 2022-03-08 DIAGNOSIS — M15.9 PRIMARY OSTEOARTHRITIS INVOLVING MULTIPLE JOINTS: ICD-10-CM

## 2022-03-08 DIAGNOSIS — Z00.00 ENCOUNTER FOR ANNUAL HEALTH EXAMINATION: Primary | ICD-10-CM

## 2022-03-08 PROBLEM — R13.10 DYSPHAGIA: Status: RESOLVED | Noted: 2018-12-06 | Resolved: 2022-03-08

## 2022-03-08 PROBLEM — H43.391 FLOATER, VITREOUS, RIGHT: Status: RESOLVED | Noted: 2020-03-10 | Resolved: 2022-03-08

## 2022-03-08 PROBLEM — F33.0 MILD RECURRENT MAJOR DEPRESSION (HCC): Chronic | Status: RESOLVED | Noted: 2017-07-17 | Resolved: 2022-03-08

## 2022-03-08 PROBLEM — E11.65 UNCONTROLLED TYPE 2 DIABETES MELLITUS WITH HYPERGLYCEMIA (HCC): Status: RESOLVED | Noted: 2021-03-24 | Resolved: 2022-03-08

## 2022-03-08 PROBLEM — N18.30 CKD (CHRONIC KIDNEY DISEASE) STAGE 3, GFR 30-59 ML/MIN (HCC): Chronic | Status: RESOLVED | Noted: 2021-02-12 | Resolved: 2022-03-08

## 2022-03-08 PROBLEM — R44.1 VISUAL HALLUCINATIONS: Status: RESOLVED | Noted: 2021-04-27 | Resolved: 2022-03-08

## 2022-03-08 PROBLEM — F33.0 MILD RECURRENT MAJOR DEPRESSION: Chronic | Status: RESOLVED | Noted: 2017-07-17 | Resolved: 2022-03-08

## 2022-03-08 PROCEDURE — 3008F BODY MASS INDEX DOCD: CPT | Performed by: FAMILY MEDICINE

## 2022-03-08 PROCEDURE — G0439 PPPS, SUBSEQ VISIT: HCPCS | Performed by: FAMILY MEDICINE

## 2022-03-08 PROCEDURE — 96160 PT-FOCUSED HLTH RISK ASSMT: CPT | Performed by: FAMILY MEDICINE

## 2022-03-08 PROCEDURE — 3074F SYST BP LT 130 MM HG: CPT | Performed by: FAMILY MEDICINE

## 2022-03-08 PROCEDURE — 99397 PER PM REEVAL EST PAT 65+ YR: CPT | Performed by: FAMILY MEDICINE

## 2022-03-08 PROCEDURE — 3078F DIAST BP <80 MM HG: CPT | Performed by: FAMILY MEDICINE

## 2022-03-08 RX ORDER — SODIUM FLUORIDE 6 MG/ML
PASTE, DENTIFRICE DENTAL
COMMUNITY
Start: 2022-03-02

## 2022-03-16 ENCOUNTER — LAB ENCOUNTER (OUTPATIENT)
Dept: LAB | Facility: HOSPITAL | Age: 82
End: 2022-03-16
Attending: FAMILY MEDICINE
Payer: MEDICARE

## 2022-03-16 DIAGNOSIS — E78.00 PURE HYPERCHOLESTEROLEMIA: Primary | ICD-10-CM

## 2022-03-16 LAB
ALBUMIN SERPL-MCNC: 3.9 G/DL (ref 3.4–5)
ALBUMIN/GLOB SERPL: 1.6 {RATIO} (ref 1–2)
ALP LIVER SERPL-CCNC: 72 U/L
ALT SERPL-CCNC: 32 U/L
ANION GAP SERPL CALC-SCNC: 5 MMOL/L (ref 0–18)
AST SERPL-CCNC: 24 U/L (ref 15–37)
BASOPHILS # BLD AUTO: 0.03 X10(3) UL (ref 0–0.2)
BASOPHILS NFR BLD AUTO: 0.7 %
BILIRUB SERPL-MCNC: 0.6 MG/DL (ref 0.1–2)
BILIRUB UR QL: NEGATIVE
BUN BLD-MCNC: 18 MG/DL (ref 7–18)
BUN/CREAT SERPL: 18.4 (ref 10–20)
CHLORIDE SERPL-SCNC: 111 MMOL/L (ref 98–112)
CHOLEST SERPL-MCNC: 107 MG/DL (ref ?–200)
CO2 SERPL-SCNC: 27 MMOL/L (ref 21–32)
COLOR UR: YELLOW
CREAT BLD-MCNC: 0.98 MG/DL
CREAT UR-SCNC: 162 MG/DL
DEPRECATED RDW RBC AUTO: 39.7 FL (ref 35.1–46.3)
EOSINOPHIL # BLD AUTO: 0.2 X10(3) UL (ref 0–0.7)
EOSINOPHIL NFR BLD AUTO: 4.8 %
ERYTHROCYTE [DISTWIDTH] IN BLOOD BY AUTOMATED COUNT: 12.3 % (ref 11–15)
EST. AVERAGE GLUCOSE BLD GHB EST-MCNC: 128 MG/DL (ref 68–126)
FASTING STATUS PATIENT QL REPORTED: YES
GLUCOSE BLD-MCNC: 113 MG/DL (ref 70–99)
GLUCOSE UR-MCNC: NEGATIVE MG/DL
HBA1C MFR BLD: 6.1 % (ref ?–5.7)
HCT VFR BLD AUTO: 38.1 %
HDLC SERPL-MCNC: 49 MG/DL (ref 40–59)
HEMOCCULT STL QL: NEGATIVE
HGB BLD-MCNC: 13.1 G/DL
HGB UR QL STRIP.AUTO: NEGATIVE
IMM GRANULOCYTES # BLD AUTO: 0.01 X10(3) UL (ref 0–1)
KETONES UR-MCNC: NEGATIVE MG/DL
LDLC SERPL CALC-MCNC: 37 MG/DL (ref ?–100)
LYMPHOCYTES # BLD AUTO: 1.63 X10(3) UL (ref 1–4)
LYMPHOCYTES NFR BLD AUTO: 38.7 %
MCH RBC QN AUTO: 30.3 PG (ref 26–34)
MCHC RBC AUTO-ENTMCNC: 34.4 G/DL (ref 31–37)
MCV RBC AUTO: 88.2 FL
MICROALBUMIN UR-MCNC: 2.06 MG/DL
MICROALBUMIN/CREAT 24H UR-RTO: 12.7 UG/MG (ref ?–30)
MONOCYTES # BLD AUTO: 0.33 X10(3) UL (ref 0.1–1)
MONOCYTES NFR BLD AUTO: 7.8 %
NEUTROPHILS # BLD AUTO: 2.01 X10 (3) UL (ref 1.5–7.7)
NEUTROPHILS # BLD AUTO: 2.01 X10(3) UL (ref 1.5–7.7)
NEUTROPHILS NFR BLD AUTO: 47.8 %
NITRITE UR QL STRIP.AUTO: NEGATIVE
NONHDLC SERPL-MCNC: 58 MG/DL (ref ?–130)
OSMOLALITY SERPL CALC.SUM OF ELEC: 299 MOSM/KG (ref 275–295)
PH UR: 6 [PH] (ref 5–8)
PLATELET # BLD AUTO: 116 10(3)UL (ref 150–450)
POTASSIUM SERPL-SCNC: 3.8 MMOL/L (ref 3.5–5.1)
PROT SERPL-MCNC: 6.4 G/DL (ref 6.4–8.2)
PROT UR-MCNC: 30 MG/DL
RBC # BLD AUTO: 4.32 X10(6)UL
SODIUM SERPL-SCNC: 143 MMOL/L (ref 136–145)
SP GR UR STRIP: 1.02 (ref 1–1.03)
TRIGL SERPL-MCNC: 115 MG/DL (ref 30–149)
TSI SER-ACNC: 3.7 MIU/ML (ref 0.36–3.74)
UROBILINOGEN UR STRIP-ACNC: <2
VIT B12 SERPL-MCNC: 554 PG/ML (ref 193–986)
VIT C UR-MCNC: 40 MG/DL
VLDLC SERPL CALC-MCNC: 16 MG/DL (ref 0–30)
WBC # BLD AUTO: 4.2 X10(3) UL (ref 4–11)

## 2022-03-16 PROCEDURE — 84443 ASSAY THYROID STIM HORMONE: CPT | Performed by: FAMILY MEDICINE

## 2022-03-16 PROCEDURE — 82043 UR ALBUMIN QUANTITATIVE: CPT | Performed by: FAMILY MEDICINE

## 2022-03-16 PROCEDURE — 82274 ASSAY TEST FOR BLOOD FECAL: CPT | Performed by: FAMILY MEDICINE

## 2022-03-16 PROCEDURE — 82570 ASSAY OF URINE CREATININE: CPT | Performed by: FAMILY MEDICINE

## 2022-03-16 PROCEDURE — 80053 COMPREHEN METABOLIC PANEL: CPT | Performed by: FAMILY MEDICINE

## 2022-03-16 PROCEDURE — 82607 VITAMIN B-12: CPT | Performed by: FAMILY MEDICINE

## 2022-03-16 PROCEDURE — 82306 VITAMIN D 25 HYDROXY: CPT | Performed by: FAMILY MEDICINE

## 2022-03-16 PROCEDURE — 81001 URINALYSIS AUTO W/SCOPE: CPT | Performed by: FAMILY MEDICINE

## 2022-03-16 PROCEDURE — 80061 LIPID PANEL: CPT

## 2022-03-16 PROCEDURE — 83036 HEMOGLOBIN GLYCOSYLATED A1C: CPT | Performed by: FAMILY MEDICINE

## 2022-03-16 PROCEDURE — 85025 COMPLETE CBC W/AUTO DIFF WBC: CPT | Performed by: FAMILY MEDICINE

## 2022-03-16 PROCEDURE — 36415 COLL VENOUS BLD VENIPUNCTURE: CPT | Performed by: FAMILY MEDICINE

## 2022-04-05 ENCOUNTER — HOSPITAL ENCOUNTER (OUTPATIENT)
Age: 82
Discharge: ANOTHER HOSPITAL | End: 2022-04-05
Payer: MEDICARE

## 2022-04-05 ENCOUNTER — APPOINTMENT (OUTPATIENT)
Dept: GENERAL RADIOLOGY | Facility: HOSPITAL | Age: 82
End: 2022-04-05
Payer: MEDICARE

## 2022-04-05 ENCOUNTER — HOSPITAL ENCOUNTER (EMERGENCY)
Facility: HOSPITAL | Age: 82
Discharge: HOME | End: 2022-04-05
Payer: MEDICARE

## 2022-04-05 VITALS
BODY MASS INDEX: 23.3 KG/M2 | TEMPERATURE: 97 F | RESPIRATION RATE: 18 BRPM | DIASTOLIC BLOOD PRESSURE: 71 MMHG | OXYGEN SATURATION: 97 % | HEIGHT: 58 IN | SYSTOLIC BLOOD PRESSURE: 162 MMHG | HEART RATE: 56 BPM | WEIGHT: 111 LBS

## 2022-04-05 VITALS
TEMPERATURE: 98 F | OXYGEN SATURATION: 96 % | DIASTOLIC BLOOD PRESSURE: 60 MMHG | RESPIRATION RATE: 18 BRPM | WEIGHT: 111 LBS | HEART RATE: 56 BPM | SYSTOLIC BLOOD PRESSURE: 145 MMHG | HEIGHT: 58 IN | BODY MASS INDEX: 23.3 KG/M2
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
## 2022-04-05 DIAGNOSIS — S52.591A OTHER CLOSED FRACTURE OF DISTAL END OF RIGHT RADIUS, INITIAL ENCOUNTER: Primary | ICD-10-CM

## 2022-04-05 DIAGNOSIS — Z91.81 RISK FOR FALLS: Primary | ICD-10-CM

## 2022-04-05 PROCEDURE — 29125 APPL SHORT ARM SPLINT STATIC: CPT

## 2022-04-05 PROCEDURE — 99205 OFFICE O/P NEW HI 60 MIN: CPT

## 2022-04-05 PROCEDURE — 73110 X-RAY EXAM OF WRIST: CPT

## 2022-04-05 PROCEDURE — 99284 EMERGENCY DEPT VISIT MOD MDM: CPT

## 2022-04-05 RX ORDER — ACETAMINOPHEN AND CODEINE PHOSPHATE 300; 30 MG/1; MG/1
1 TABLET ORAL EVERY 4 HOURS PRN
Qty: 15 TABLET | Refills: 0 | Status: SHIPPED | OUTPATIENT
Start: 2022-04-05 | End: 2022-04-20

## 2022-04-05 RX ORDER — ACETAMINOPHEN 325 MG/1
650 TABLET ORAL ONCE
Status: COMPLETED | OUTPATIENT
Start: 2022-04-05 | End: 2022-04-05

## 2022-04-05 NOTE — ED INITIAL ASSESSMENT (HPI)
Pt presents to IC a&o4/4 with complaints of R wrist pain, R side pain, R face pain. Denies neck pain. Denies LOC. States mechanical fall, missed a step. Denies dizziness. Per daughter, pt has existing R sided deficits from previous stroke in 2019. Pt in NAD, fully alert, icing wrist.     Did not take anything for pain PTA.

## 2022-04-05 NOTE — ED INITIAL ASSESSMENT (HPI)
Pt to ED from [facility] s/p mechanical fall at home at 1700 today. Pt c/o right wrist pain with +deformity noted. +right radial pulse palpated. Pt c/o right shoulder and right neck pain. No tenderness noted to cervical spine with palpation. Pt denies hitting head or LOC.  Pt on asa

## 2022-04-06 ENCOUNTER — TELEPHONE (OUTPATIENT)
Dept: ORTHOPEDICS CLINIC | Facility: CLINIC | Age: 82
End: 2022-04-06

## 2022-04-06 NOTE — TELEPHONE ENCOUNTER
Per daughter pt was seen at [address] [place] [address] ED for wrist fracture, was advised to f/u with ortho in 3 days, no openings available. Please advise thank you.

## 2022-04-06 NOTE — TELEPHONE ENCOUNTER
S/w Christin Galvez and she states pt tripped and fell on 4/5/22. Pt went ER and had XR and put in temp splint. appt made on 4/8 @ 10am with Dr. Ivett Rowland. Address given.

## 2022-04-08 ENCOUNTER — OFFICE VISIT (OUTPATIENT)
Dept: ORTHOPEDICS CLINIC | Facility: CLINIC | Age: 82
End: 2022-04-08
Payer: COMMERCIAL

## 2022-04-08 DIAGNOSIS — S52.531A CLOSED COLLES' FRACTURE OF RIGHT RADIUS, INITIAL ENCOUNTER: Primary | ICD-10-CM

## 2022-04-08 PROCEDURE — 25600 CLTX DST RDL FX/EPHYS SEP WO: CPT | Performed by: ORTHOPAEDIC SURGERY

## 2022-04-08 PROCEDURE — 99204 OFFICE O/P NEW MOD 45 MIN: CPT | Performed by: ORTHOPAEDIC SURGERY

## 2022-04-20 ENCOUNTER — HOSPITAL ENCOUNTER (OUTPATIENT)
Dept: GENERAL RADIOLOGY | Facility: HOSPITAL | Age: 82
Discharge: HOME | End: 2022-04-20
Attending: ORTHOPAEDIC SURGERY
Payer: MEDICARE

## 2022-04-20 ENCOUNTER — OFFICE VISIT (OUTPATIENT)
Dept: ORTHOPEDICS CLINIC | Facility: CLINIC | Age: 82
End: 2022-04-20
Payer: COMMERCIAL

## 2022-04-20 DIAGNOSIS — S52.531P CLOSED COLLES' FRACTURE OF RIGHT RADIUS WITH MALUNION, SUBSEQUENT ENCOUNTER: Primary | ICD-10-CM

## 2022-04-20 DIAGNOSIS — S52.531P CLOSED COLLES' FRACTURE OF RIGHT RADIUS WITH MALUNION, SUBSEQUENT ENCOUNTER: ICD-10-CM

## 2022-04-20 PROCEDURE — 99024 POSTOP FOLLOW-UP VISIT: CPT | Performed by: ORTHOPAEDIC SURGERY

## 2022-04-20 PROCEDURE — 73110 X-RAY EXAM OF WRIST: CPT | Performed by: ORTHOPAEDIC SURGERY

## 2022-04-21 DIAGNOSIS — F33.0 MILD RECURRENT MAJOR DEPRESSION (HCC): ICD-10-CM

## 2022-04-21 RX ORDER — PAROXETINE 10 MG/1
10 TABLET, FILM COATED ORAL DAILY
Qty: 90 TABLET | Refills: 1 | Status: SHIPPED | OUTPATIENT
Start: 2022-04-21 | End: 2022-10-06

## 2022-04-22 NOTE — TELEPHONE ENCOUNTER
Refill passed per [facility] protocol.      Requested Prescriptions   Pending Prescriptions Disp Refills    PAROXETINE 10 MG Oral Tab [Pharmacy Med Name: PAROXETINE  10MG  TAB] 90 tablet 3     Sig: TAKE 1 TABLET BY MOUTH  DAILY        Psychiatric Non-Scheduled (Anti-Anxiety) Passed - 4/21/2022  9:56 PM        Passed - Appointment in last 6 or next 3 months               Recent Outpatient Visits              Yesterday Closed Colles' fracture of right radius with malunion, subsequent encounter    [facility], [address], [place]    Office Visit    1 week ago Closed Colles' fracture of right radius, initial encounter    [facility] Daiva Spikes, Jose Abraham MD    Office Visit    1 month ago History of CVA (cerebrovascular accident)    Cardiology - [address]    Nurse Only    1 month ago Encounter for annual health examination    [place] [facility] [place], [facility], [address], Debbie Tomas MD    Office Visit    2 months ago History of CVA (cerebrovascular accident)    Cardiology [address]    Nurse Only             Future Appointments         Provider Department Appt Notes    In 2 weeks Cm Hall MD [facility], [address] 2 week fu    In 1 month Ty Petit MD [facility] Ophthalmology yearly for diabetic eye

## 2022-05-02 RX ORDER — ALENDRONATE SODIUM 70 MG/1
70 TABLET ORAL WEEKLY
Qty: 12 TABLET | Refills: 1 | Status: SHIPPED | OUTPATIENT
Start: 2022-05-02

## 2022-05-02 NOTE — TELEPHONE ENCOUNTER
Refill passed per [facility] protocol   Requested Prescriptions   Pending Prescriptions Disp Refills    ALENDRONATE 70 MG Oral Tab [Pharmacy Med Name: Alendronate Sodium 70 MG Oral Tablet] 12 tablet 3     Sig: TAKE 1 TABLET BY MOUTH  WEEKLY WITH 8 OZ OF PLAIN  WATER 30 MINUTES BEFORE  FIRST FOOD, [address] OR MEDS.   STAY UPRIGHT FOR 30 MINS        Osteoporosis Medication Protocol Passed - 5/1/2022  9:02 PM        Passed - Appointment within past 12 or next 3 months

## 2022-05-04 ENCOUNTER — HOSPITAL ENCOUNTER (OUTPATIENT)
Dept: GENERAL RADIOLOGY | Facility: HOSPITAL | Age: 82
Discharge: HOME | End: 2022-05-04
Attending: ORTHOPAEDIC SURGERY
Payer: MEDICARE

## 2022-05-04 ENCOUNTER — OFFICE VISIT (OUTPATIENT)
Dept: ORTHOPEDICS CLINIC | Facility: CLINIC | Age: 82
End: 2022-05-04
Payer: COMMERCIAL

## 2022-05-04 DIAGNOSIS — S62.101A CLOSED FRACTURE OF RIGHT WRIST, INITIAL ENCOUNTER: ICD-10-CM

## 2022-05-04 DIAGNOSIS — S52.531P CLOSED COLLES' FRACTURE OF RIGHT RADIUS WITH MALUNION, SUBSEQUENT ENCOUNTER: Primary | ICD-10-CM

## 2022-05-04 PROCEDURE — 73110 X-RAY EXAM OF WRIST: CPT | Performed by: ORTHOPAEDIC SURGERY

## 2022-05-20 ENCOUNTER — TELEPHONE (OUTPATIENT)
Dept: GASTROENTEROLOGY | Facility: CLINIC | Age: 82
End: 2022-05-20

## 2022-05-20 NOTE — TELEPHONE ENCOUNTER
----- Message from Cinthia Ramirez, [address] sent at 5/20/2022  9:25 AM CDT -----  Regarding: Recall Colon  Tom Lopez CMA  P Em Gi Surg/Proc Scheduling  Recall colon in 5 years per PL.  Colon done 7-20-17

## 2022-05-26 RX ORDER — ATORVASTATIN CALCIUM 80 MG/1
TABLET, FILM COATED ORAL
Qty: 90 TABLET | Refills: 3 | Status: SHIPPED | OUTPATIENT
Start: 2022-05-26

## 2022-05-26 NOTE — TELEPHONE ENCOUNTER
Pt requesting 1 year supply  Refill passed per [place] protocol.       Requested Prescriptions   Pending Prescriptions Disp Refills    ATORVASTATIN 80 MG Oral Tab [Pharmacy Med Name: Atorvastatin Calcium 80 MG Oral Tablet] 90 tablet 3     Sig: TAKE 1 TABLET PER G TUBE  ROUTE NIGHTLY        Cholesterol Medication Protocol Passed - 5/25/2022 10:41 PM        Passed - ALT in past 12 months        Passed - LDL in past 12 months        Passed - Last ALT < 80       Lab Results   Component Value Date    ALT 32 03/16/2022             Passed - Last LDL < 130     Lab Results   Component Value Date    LDL 37 03/16/2022               Passed - Appointment in past 12 or next 3 months               Future Appointments         Provider Department Appt Notes    In 1 week Laurie Davila MD [facility], [address], [place] FU/apt is further out due to doc schedule being full    In 2 weeks [address] in [address] [facility]/Medicaid    In 2 weeks Floyd [address] in [place]    In 3 weeks Rasheeda Gunderson MD [facility] Ophthalmology yearly for diabetic eye    In 3 weeks [address] in [place]    In 3 weeks PepinFloyd [address]o [address] in [place]    In 4 weeks Floyd Morin [address] in [place]    In [address] [facility], [place] [address] in [place]    In [address] [facility], [address] in [facility] [address]/Medicaid            Recent Outpatient Visits              3 weeks ago Closed Colles' fracture of right radius with malunion, subsequent encounter    [facility] Madelaine Thomas MD    Office Visit    1 month ago Closed Colles' fracture of right radius with malunion, subsequent encounter    [facility] [address] [facility] Marybeth Pacheco, Maira Zamudio MD    Office Visit    1 month ago Closed Colles' fracture of right radius, initial encounter    [facility] Jeffrey Son, Maira Zamudio MD    Office Visit    2 months ago History of CVA (cerebrovascular accident)    Cardiology - Evie Sánchez, Osorio    Nurse Only    2 months ago Encounter for annual health examination    Williams Mitchell, Rah Gillespie MD    Office Visit

## 2022-06-02 ENCOUNTER — HOSPITAL ENCOUNTER (OUTPATIENT)
Dept: GENERAL RADIOLOGY | Facility: HOSPITAL | Age: 82
Discharge: HOME | End: 2022-06-02
Attending: ORTHOPAEDIC SURGERY
Payer: MEDICARE

## 2022-06-02 ENCOUNTER — OFFICE VISIT (OUTPATIENT)
Dept: ORTHOPEDICS CLINIC | Facility: CLINIC | Age: 82
End: 2022-06-02
Payer: COMMERCIAL

## 2022-06-02 DIAGNOSIS — Z47.89 ORTHOPEDIC AFTERCARE: ICD-10-CM

## 2022-06-02 DIAGNOSIS — S52.531P CLOSED COLLES' FRACTURE OF RIGHT RADIUS WITH MALUNION, SUBSEQUENT ENCOUNTER: Primary | ICD-10-CM

## 2022-06-02 PROCEDURE — 99024 POSTOP FOLLOW-UP VISIT: CPT | Performed by: ORTHOPAEDIC SURGERY

## 2022-06-02 PROCEDURE — 73110 X-RAY EXAM OF WRIST: CPT | Performed by: ORTHOPAEDIC SURGERY

## 2022-06-08 ENCOUNTER — TELEPHONE (OUTPATIENT)
Dept: PHYSICAL THERAPY | Facility: HOSPITAL | Age: 82
End: 2022-06-08

## 2022-06-09 ENCOUNTER — APPOINTMENT (OUTPATIENT)
Dept: PHYSICAL THERAPY | Age: 82
End: 2022-06-09
Attending: ORTHOPAEDIC SURGERY
Payer: MEDICARE

## 2022-06-09 ENCOUNTER — TELEPHONE (OUTPATIENT)
Dept: PHYSICAL THERAPY | Facility: HOSPITAL | Age: 82
End: 2022-06-09

## 2022-06-14 ENCOUNTER — OFFICE VISIT (OUTPATIENT)
Dept: PHYSICAL THERAPY | Age: 82
End: 2022-06-14
Attending: ORTHOPAEDIC SURGERY
Payer: MEDICARE

## 2022-06-14 PROCEDURE — 97165 OT EVAL LOW COMPLEX 30 MIN: CPT

## 2022-06-14 PROCEDURE — 97112 NEUROMUSCULAR REEDUCATION: CPT

## 2022-06-16 ENCOUNTER — OFFICE VISIT (OUTPATIENT)
Dept: OPHTHALMOLOGY | Facility: CLINIC | Age: 82
End: 2022-06-16
Payer: COMMERCIAL

## 2022-06-16 ENCOUNTER — APPOINTMENT (OUTPATIENT)
Dept: PHYSICAL THERAPY | Age: 82
End: 2022-06-16
Attending: ORTHOPAEDIC SURGERY
Payer: MEDICARE

## 2022-06-16 ENCOUNTER — TELEPHONE (OUTPATIENT)
Dept: OCCUPATIONAL MEDICINE | Age: 82
End: 2022-06-16

## 2022-06-16 DIAGNOSIS — E11.9 TYPE 2 DIABETES MELLITUS WITHOUT RETINOPATHY (HCC): Primary | ICD-10-CM

## 2022-06-16 DIAGNOSIS — H43.391 FLOATER, VITREOUS, RIGHT: ICD-10-CM

## 2022-06-16 DIAGNOSIS — Z96.1 PSEUDOPHAKIA OF BOTH EYES: ICD-10-CM

## 2022-06-16 PROCEDURE — 1126F AMNT PAIN NOTED NONE PRSNT: CPT | Performed by: OPHTHALMOLOGY

## 2022-06-16 PROCEDURE — 92014 COMPRE OPH EXAM EST PT 1/>: CPT | Performed by: OPHTHALMOLOGY

## 2022-06-16 NOTE — ASSESSMENT & PLAN NOTE
Diet Controlled DM: no background of retinopathy, no signs of neovascularization noted. Discussed ocular and systemic benefits of blood sugar control. Diagnosis and treatment discussed in detail with patient.

## 2022-06-16 NOTE — TELEPHONE ENCOUNTER
Call placed to patient's daughter due to patient 10 minutes late for session. Daughter indicated they were almost to clinic. However, when they arrived, they were at that time 19 minutes late for session and daughter indicated they still had to leave 15 minutes early. This OT informed daughter that in light of them needing to cut therapy session short, they now had missed therapy appointment and there was insufficient time in 11 minutes to perform therapy program.  Confirmed next appointment on 6/21 and reminded daughter that we need to reschedule the appointments that she canceled due to them being out of town. She reported they will return 7/3/22.     ROSIE Lux, OTR/L, CHT

## 2022-06-16 NOTE — PATIENT INSTRUCTIONS
Type 2 diabetes mellitus without retinopathy ([facility] [address]  Diet Controlled DM: no background of retinopathy, no signs of neovascularization noted. Discussed ocular and systemic benefits of blood sugar control. Diagnosis and treatment discussed in detail with patient. Pseudophakia of both eyes  No treatment. Floater, vitreous, right  No treatment.

## 2022-06-18 RX ORDER — LOSARTAN POTASSIUM 50 MG/1
50 TABLET ORAL 2 TIMES DAILY
Qty: 180 TABLET | Refills: 1 | Status: SHIPPED | OUTPATIENT
Start: 2022-06-18

## 2022-06-21 ENCOUNTER — OFFICE VISIT (OUTPATIENT)
Dept: OCCUPATIONAL MEDICINE | Facility: HOSPITAL | Age: 82
End: 2022-06-21
Attending: FAMILY MEDICINE
Payer: MEDICARE

## 2022-06-21 ENCOUNTER — APPOINTMENT (OUTPATIENT)
Dept: PHYSICAL THERAPY | Age: 82
End: 2022-06-21
Attending: ORTHOPAEDIC SURGERY
Payer: MEDICARE

## 2022-06-21 PROCEDURE — 97110 THERAPEUTIC EXERCISES: CPT | Performed by: OCCUPATIONAL THERAPIST

## 2022-06-21 PROCEDURE — 97530 THERAPEUTIC ACTIVITIES: CPT | Performed by: OCCUPATIONAL THERAPIST

## 2022-06-21 PROCEDURE — 97140 MANUAL THERAPY 1/> REGIONS: CPT | Performed by: OCCUPATIONAL THERAPIST

## 2022-06-21 NOTE — PROGRESS NOTES
Dx: Closed Colles' fracture of right radius with malunion, subsequent encounter (S52.531P)         Authorized # of Visits:  8         Next MD visit: none scheduled  Fall Risk: standard         Precautions: fall risk  , Language barrier (Libyan)        Medication Changes since last visit?: No  Subjective: Patient reports she has been having pain in her fingers and wrist , unable to quantify. Objective: Treatment began with edema massage follow by digit PROM, wrist AAROM, forearm AROM,       Date 06/21/22                 Visit # 2                                    Evaluation                 Manual                   Edema massage 5 min                  digit PROM  D1--D5 5 min                                     Ther ex                   Thumb AROM  x10                 Digit abduction/addiction AROM x10                  AAROM wrist flexion/extension  x10                  forearm AAROM using table  x10                  fists  x10                                                         HEP instruction   review wrist and digit AROM exercises, see below                                     Therapeutic Activity                    functional grasping,    handfuls of POM POM, x20    Isolated digit to thumb two point pinch to  30 POM POMS                 Neuromuscular Re-education                                                             Modalities                                                                   Assessment: Patient transferred care from [address] [place] [address] to [facility]. Patient accompanied by her granddaughter who assisted per her request with interpreting. Patient presents with significant edema in digits and wrist, limited wrist, forearm and digit AROM. Patient had good response to treatment by end of treatment able to grasp handful of POM POMs and place in container several times. Reviewed and issued HEP In [address], patient may need some assistance.       Goals:   Patient will be independent and compliant with comprehensive HEP to maintain progress achieved in OT. Patient will present with increase in Patient Specific Function Scale to 4 or better to represent reported improvement in functional task performance. Patient will present with increase in right wrist extension to 35 and flexion to 20 degrees to allow for ease with holding bottle. Patient will present with increase in right forearm pronation to 35 to allow for ease with applying lotion to LUE. Plan: Review HEP, continue to work towards increasing digit and wrist AROM for functional grasping.     Charges: MT,TE,TA   Total Timed Treatment: 50 min  Total Treatment Time: 50 min

## 2022-06-23 ENCOUNTER — APPOINTMENT (OUTPATIENT)
Dept: PHYSICAL THERAPY | Age: 82
End: 2022-06-23
Attending: ORTHOPAEDIC SURGERY
Payer: MEDICARE

## 2022-06-28 ENCOUNTER — APPOINTMENT (OUTPATIENT)
Dept: PHYSICAL THERAPY | Age: 82
End: 2022-06-28
Attending: ORTHOPAEDIC SURGERY
Payer: MEDICARE

## 2022-06-30 ENCOUNTER — APPOINTMENT (OUTPATIENT)
Dept: PHYSICAL THERAPY | Age: 82
End: 2022-06-30
Attending: ORTHOPAEDIC SURGERY
Payer: MEDICARE

## 2022-07-05 ENCOUNTER — APPOINTMENT (OUTPATIENT)
Dept: OCCUPATIONAL MEDICINE | Facility: HOSPITAL | Age: 82
End: 2022-07-05
Attending: FAMILY MEDICINE
Payer: MEDICARE

## 2022-07-05 ENCOUNTER — TELEPHONE (OUTPATIENT)
Dept: OCCUPATIONAL MEDICINE | Facility: HOSPITAL | Age: 82
End: 2022-07-05

## 2022-07-07 ENCOUNTER — OFFICE VISIT (OUTPATIENT)
Dept: OCCUPATIONAL MEDICINE | Facility: HOSPITAL | Age: 82
End: 2022-07-07
Attending: FAMILY MEDICINE
Payer: MEDICARE

## 2022-07-07 PROCEDURE — 97110 THERAPEUTIC EXERCISES: CPT | Performed by: OCCUPATIONAL THERAPIST

## 2022-07-07 PROCEDURE — 97530 THERAPEUTIC ACTIVITIES: CPT | Performed by: OCCUPATIONAL THERAPIST

## 2022-07-07 PROCEDURE — 97140 MANUAL THERAPY 1/> REGIONS: CPT | Performed by: OCCUPATIONAL THERAPIST

## 2022-07-07 NOTE — PROGRESS NOTES
Dx: Closed Colles' fracture of right radius with malunion, subsequent encounter (S52.531P)         Authorized # of Visits:  8         Next MD visit: none scheduled  Fall Risk: standard         Precautions: fall risk  , Language barrier (Honduran)        Medication Changes since last visit?: No  Subjective: Per patient's daughter, Spencer Armando is afraid of moving her right hand and only moves it slightly     Objective: Treatment began with edema massage follow by digit PROM, wrist AAROM, forearm AROM, functional grasping. Date 06/21/22 07/07/22               Visit # 2  3                   (1 NS)               Evaluation                 Manual                   Edema massage 5 min  5 min                digit PROM  D1--D5 5 min  D1-D5 5 min                                   Ther ex                   Thumb AROM  x10  x10               Digit abduction/addiction AROM x10  stacking cones with right hand                AAROM wrist flexion/extension  x10  x10                forearm AAROM using table  x10  x10                fists  x10 Two point pinch to  POM POMs each digit x 20                gripping  With pink sponge    x10                                   HEP instruction   review wrist and digit AROM exercises, see below                                     Therapeutic Activity                    functional grasping,    handfuls of POM POM, x20    Isolated digit to thumb two point pinch to  30 POM POMS   handfuls of POM POM, x20    Sensory bin#3, finding 10 large chips and marbles               Neuromuscular Re-education                                                             Modalities                                                               Assessment: Patient prefers to use daughter as  during session today.  Patient holding right hand in dependent position, avoids using right hand initially, during session encouraged to use hand for light two point pinch and grasping, improved by end of session. Goals:   Patient will be independent and compliant with comprehensive HEP to maintain progress achieved in OT. Patient will present with increase in Patient Specific Function Scale to 4 or better to represent reported improvement in functional task performance. Patient will present with increase in right wrist extension to 35 and flexion to 20 degrees to allow for ease with holding bottle. Patient will present with increase in right forearm pronation to 35 to allow for ease with applying lotion to LUE. Plan: Review HEP, continue to work towards increasing digit and wrist AROM for functional grasping.     Charges: MT,TE,TA   Total Timed Treatment: 50 min  Total Treatment Time: 50 min

## 2022-07-12 ENCOUNTER — OFFICE VISIT (OUTPATIENT)
Dept: OCCUPATIONAL MEDICINE | Facility: HOSPITAL | Age: 82
End: 2022-07-12
Attending: FAMILY MEDICINE
Payer: MEDICARE

## 2022-07-12 PROCEDURE — 97530 THERAPEUTIC ACTIVITIES: CPT | Performed by: OCCUPATIONAL THERAPIST

## 2022-07-12 PROCEDURE — 97110 THERAPEUTIC EXERCISES: CPT | Performed by: OCCUPATIONAL THERAPIST

## 2022-07-12 NOTE — PROGRESS NOTES
Dx: Closed Colles' fracture of right radius with malunion, subsequent encounter (S52.531P)         Authorized # of Visits:  8         Next MD visit: none scheduled  Fall Risk: standard         Precautions: fall risk  , Language barrier (Maltese)        Medication Changes since last visit?: No  Subjective: Patient's daughter reports\" We try to tell her to use her hand but she won't at home. \"    Objective: Treatment began with edema massage follow by digit PROM, wrist AAROM, forearm AROM, functional grasping.   Measurements: attempt to measure, patient uncooperative       Date 06/21/22 07/07/22 07/12/22             Visit # 2  3  4                 (1 NS)  (1NS)             Evaluation                 Manual                   Edema massage 5 min  5 min                digit PROM  D1--D5 5 min  D1-D5 5 min  D1- D5, 5 min                                 Ther ex                   Thumb AROM  x10  x10  x10             Digit abduction/addiction AROM x10  stacking cones with right hand  stacking cones with right hand x20              AAROM wrist flexion/extension  x10  x10  x10              forearm AAROM using table  x10  x10  x10              fists  x10 Two point pinch to  POM POMs each digit x 20  Two point pinch to  POM POMs each digit x 20              gripping  With pink sponge    x10  velcro checkers two point pinch x 40              reaching across midline - placing velcro checkers on tree      x40             HEP instruction   review wrist and digit AROM exercises, see below                                     Therapeutic Activity                    functional grasping,    handfuls of POM POM, x20    Isolated digit to thumb two point pinch to  30 POM POMS   handfuls of POM POM, x20    Sensory bin#3, finding 10 large chips and marbles   handfuls of POM POM, x20    Folding towel, bilateral      Sensory bin#3, finding 10 large chips and marbles                 Neuromuscular Re-education                                                             Modalities                    Moist heat      3 min                                     Assessment: Patient prefers to use daughter as  during session today. Patient holding right hand in dependent position, unable to perform accurate measurement, patient uncooperative. Patient demonstrated good functional grasp and pinch with manipulating objects, required some cueing. Goals:   Patient will be independent and compliant with comprehensive HEP to maintain progress achieved in OT. Patient will present with increase in Patient Specific Function Scale to 4 or better to represent reported improvement in functional task performance. Patient will present with increase in right wrist extension to 35 and flexion to 20 degrees to allow for ease with holding bottle. Patient will present with increase in right forearm pronation to 35 to allow for ease with applying lotion to LUE. Plan: Review HEP, continue to work towards increasing digit and wrist AROM for functional grasping.     Charges: TE2,TA   Total Timed Treatment: 40 min  Total Treatment Time: 45 min

## 2022-07-14 ENCOUNTER — APPOINTMENT (OUTPATIENT)
Dept: OCCUPATIONAL MEDICINE | Facility: HOSPITAL | Age: 82
End: 2022-07-14
Attending: FAMILY MEDICINE
Payer: MEDICARE

## 2022-07-14 ENCOUNTER — TELEPHONE (OUTPATIENT)
Dept: OCCUPATIONAL MEDICINE | Facility: HOSPITAL | Age: 82
End: 2022-07-14

## 2022-07-17 DIAGNOSIS — I10 ESSENTIAL HYPERTENSION WITH GOAL BLOOD PRESSURE LESS THAN 140/90: ICD-10-CM

## 2022-07-17 DIAGNOSIS — E11.65 UNCONTROLLED TYPE 2 DIABETES MELLITUS WITH HYPERGLYCEMIA (HCC): ICD-10-CM

## 2022-07-18 RX ORDER — METOPROLOL TARTRATE 50 MG/1
50 TABLET, FILM COATED ORAL 2 TIMES DAILY
Qty: 180 TABLET | Refills: 1 | Status: SHIPPED | OUTPATIENT
Start: 2022-07-18 | End: 2022-11-16

## 2022-07-18 RX ORDER — METFORMIN HYDROCHLORIDE 500 MG/1
TABLET, EXTENDED RELEASE ORAL
Qty: 90 TABLET | Refills: 3 | OUTPATIENT
Start: 2022-07-18

## 2022-07-19 ENCOUNTER — APPOINTMENT (OUTPATIENT)
Dept: OCCUPATIONAL MEDICINE | Facility: HOSPITAL | Age: 82
End: 2022-07-19
Attending: FAMILY MEDICINE
Payer: MEDICARE

## 2022-07-21 ENCOUNTER — APPOINTMENT (OUTPATIENT)
Dept: OCCUPATIONAL MEDICINE | Facility: HOSPITAL | Age: 82
End: 2022-07-21
Attending: FAMILY MEDICINE
Payer: MEDICARE

## 2022-07-21 ENCOUNTER — TELEPHONE (OUTPATIENT)
Dept: OCCUPATIONAL MEDICINE | Facility: HOSPITAL | Age: 82
End: 2022-07-21

## 2022-07-25 ENCOUNTER — OFFICE VISIT (OUTPATIENT)
Dept: OCCUPATIONAL MEDICINE | Facility: HOSPITAL | Age: 82
End: 2022-07-25
Attending: ORTHOPAEDIC SURGERY
Payer: MEDICARE

## 2022-07-25 PROCEDURE — 97530 THERAPEUTIC ACTIVITIES: CPT | Performed by: OCCUPATIONAL THERAPIST

## 2022-07-25 PROCEDURE — 97110 THERAPEUTIC EXERCISES: CPT | Performed by: OCCUPATIONAL THERAPIST

## 2022-07-25 NOTE — PROGRESS NOTES
Dx: Closed Colles' fracture of right radius with malunion, subsequent encounter (S52.531P)         Authorized # of Visits:  8         Next MD visit: none scheduled  Fall Risk: standard         Precautions: fall risk  , Language barrier (Zimbabwean)        Medication Changes since last visit?: No  Subjective: Patient's daughter reports\"She is trying to use that hand, she can dress herself and bathe. \"    Objective: Treatment began with edema massage follow by digit PROM, wrist AAROM, forearm AROM, functional grasping.   Measurements: attempt to measure, patient uncooperative       Date 06/21/22 07/07/22 07/12/22 07/25/22           Visit # 2  3  4  5               (1 NS)  (1NS)  (2 NS)           Evaluation                 Manual                   Edema massage 5 min  5 min    5 min            digit PROM  D1--D5 5 min  D1-D5 5 min  D1- D5, 5 min  D1- D5 min                               Ther ex                   Thumb AROM  x10  x10  x10  x10           Digit abduction/addiction AROM x10  stacking cones with right hand  stacking cones with right hand x20              AAROM wrist flexion/extension  x10  x10  x10  x10            forearm AAROM using table  x10  x10  x10  AAROM forearm on table x 10            fists  x10 Two point pinch to  POM POMs each digit x 20  Two point pinch to  POM POMs each digit x 20  Composite flexion grasping potato masher, pushing into yellow putty x 10            gripping  With pink sponge    x10  velcro checkers two point pinch x 40  IP , gripping yellow putty x 10            reaching across midline - placing velcro checkers on tree      x40             HEP instruction   review wrist and digit AROM exercises, see below      finger walking towel exercise 5 min                               Therapeutic Activity                    functional grasping,    handfuls of POM POM, x20    Isolated digit to thumb two point pinch to  30 POM POMS   handfuls of POM POM, x20    Sensory bin#3, finding 10 large chips and marbles   handfuls of POM POM, x20    Folding towel, bilateral      Sensory bin#3, finding 10 large chips and marbles      Sensory bin#3, finding 10 large chips and marbles      Using hand to hold large spoon and scoop sens bin #3 into pitcher then pour x 4               Neuromuscular Re-education                                                             Modalities                    Moist heat      3 min  3 min                                   Assessment: Patient prefers to use daughter as  during session today. Patient initially having difficulty with relaxing wrist and digits for PROM. Demonstrated improved wrist and digit AROM with functional activities, able to hold handle of large spoon and scoop as well as pour 1/4 plastic pellets from pitcher. Reporting minimal pain in digits. Goals:   Patient will be independent and compliant with comprehensive HEP to maintain progress achieved in OT. Patient will present with increase in Patient Specific Function Scale to 4 or better to represent reported improvement in functional task performance. Patient will present with increase in right wrist extension to 35 and flexion to 20 degrees to allow for ease with holding bottle. Patient will present with increase in right forearm pronation to 35 to allow for ease with applying lotion to LUE. Plan: Review HEP, continue to work towards increasing digit and wrist AROM for functional grasping.     Charges: TE2,TA   Total Timed Treatment: 40 min  Total Treatment Time: 45 min

## 2022-07-29 ENCOUNTER — OFFICE VISIT (OUTPATIENT)
Dept: OCCUPATIONAL MEDICINE | Facility: HOSPITAL | Age: 82
End: 2022-07-29
Attending: ORTHOPAEDIC SURGERY
Payer: MEDICARE

## 2022-07-29 PROCEDURE — 97140 MANUAL THERAPY 1/> REGIONS: CPT | Performed by: OCCUPATIONAL THERAPIST

## 2022-07-29 PROCEDURE — 97110 THERAPEUTIC EXERCISES: CPT | Performed by: OCCUPATIONAL THERAPIST

## 2022-07-29 PROCEDURE — 97530 THERAPEUTIC ACTIVITIES: CPT | Performed by: OCCUPATIONAL THERAPIST

## 2022-07-29 NOTE — PROGRESS NOTES
Dx: Closed Colles' fracture of right radius with malunion, subsequent encounter (S52.531P)         Authorized # of Visits:  8         Next MD visit: none scheduled  Fall Risk: standard         Precautions: fall risk  , Language barrier (Kenyan)        Medication Changes since last visit?: No  Subjective: Patient's daughter reports\"She has difficulty stretching out her two fingers. \"    Objective: Treatment began with edema massage follow by digit PROM, wrist AAROM, forearm AROM, functional grasping.   Measurements: attempt to measure, patient uncooperative       Date 06/21/22 07/07/22 07/12/22 07/25/22 07/29/22         Visit # 2  3  4  5  6             (1 NS)  (1NS)  (2 NS)  (2NS)         Evaluation                 Manual                   Edema massage 5 min  5 min    5 min  5 min          digit PROM  D1--D5 5 min  D1-D5 5 min  D1- D5, 5 min  D1- D5 min  D1- D5                             Ther ex                   Thumb AROM  x10  x10  x10  x10  x10         Digit abduction/addiction AROM x10  stacking cones with right hand  stacking cones with right hand x20    stacking cones with right hand x20          AAROM wrist flexion/extension  x10  x10  x10  x10  x10          forearm AAROM using table  x10  x10  x10  AAROM forearm on table x 10  AAROM forearm on table x 10          fists  x10 Two point pinch to  POM POMs each digit x 20  Two point pinch to  POM POMs each digit x 20  Composite flexion grasping potato masher, pushing into yellow putty x 10  Composite flexion grasping potato masher, pushing into re putty x 10          gripping  With pink sponge    x10  velcro checkers two point pinch x 40  IP , gripping yellow putty x 10  IP , gripping, twist, pull pinch red putty 7 min          reaching across midline - placing velcro checkers on tree      x40    hook to fist in red putty x 20         HEP instruction   review wrist and digit AROM exercises, see below      finger walking towel exercise 5 min  f                             Therapeutic Activity                    functional grasping,    handfuls of POM POM, x20    Isolated digit to thumb two point pinch to  30 POM POMS   handfuls of POM POM, x20    Sensory bin#3, finding 10 large chips and marbles   handfuls of POM POM, x20    Folding towel, bilateral      Sensory bin#3, finding 10 large chips and marbles      Sensory bin#3, finding 10 large chips and marbles      Using hand to hold large spoon and scoop sens bin #3 into pitcher then pour x 4      Sensory bin#3, finding 10 large chips and marbles        functional grasping, open close 6 different containers         Neuromuscular Re-education                                                             Modalities                    Moist heat      3 min  3 min  3 min                                 Assessment: Patient prefers to use daughter as  during session today. After preheating right hand patient was more receptive to passive stretching of digits and wrist. Able to remove and replace 5 different caps from containers. Still holding hand in dependent position but demonstrates increased mobility with minimal indications of pain. Goals:   Patient will be independent and compliant with comprehensive HEP to maintain progress achieved in OT. Patient will present with increase in Patient Specific Function Scale to 4 or better to represent reported improvement in functional task performance. Patient will present with increase in right wrist extension to 35 and flexion to 20 degrees to allow for ease with holding bottle. Patient will present with increase in right forearm pronation to 35 to allow for ease with applying lotion to LUE. Plan: Continue to work towards increasing digit and wrist AROM for functional grasping.     Charges: MT,TE,TA   Total Timed Treatment: 43 min  Total Treatment Time: 45 min

## 2022-08-04 ENCOUNTER — APPOINTMENT (OUTPATIENT)
Dept: OCCUPATIONAL MEDICINE | Facility: HOSPITAL | Age: 82
End: 2022-08-04
Attending: ORTHOPAEDIC SURGERY
Payer: MEDICARE

## 2022-08-04 ENCOUNTER — TELEPHONE (OUTPATIENT)
Dept: OCCUPATIONAL MEDICINE | Facility: HOSPITAL | Age: 82
End: 2022-08-04

## 2022-08-09 ENCOUNTER — OFFICE VISIT (OUTPATIENT)
Dept: OCCUPATIONAL MEDICINE | Facility: HOSPITAL | Age: 82
End: 2022-08-09
Attending: ORTHOPAEDIC SURGERY
Payer: MEDICARE

## 2022-08-09 PROCEDURE — 97530 THERAPEUTIC ACTIVITIES: CPT | Performed by: OCCUPATIONAL THERAPIST

## 2022-08-09 PROCEDURE — 97140 MANUAL THERAPY 1/> REGIONS: CPT | Performed by: OCCUPATIONAL THERAPIST

## 2022-08-09 PROCEDURE — 97110 THERAPEUTIC EXERCISES: CPT | Performed by: OCCUPATIONAL THERAPIST

## 2022-08-22 RX ORDER — AMLODIPINE BESYLATE 5 MG/1
TABLET ORAL
Qty: 90 TABLET | Refills: 3 | Status: SHIPPED | OUTPATIENT
Start: 2022-08-22

## 2022-08-25 ENCOUNTER — OFFICE VISIT (OUTPATIENT)
Dept: FAMILY MEDICINE CLINIC | Facility: CLINIC | Age: 82
End: 2022-08-25
Payer: COMMERCIAL

## 2022-08-25 VITALS
TEMPERATURE: 98 F | SYSTOLIC BLOOD PRESSURE: 148 MMHG | DIASTOLIC BLOOD PRESSURE: 71 MMHG | BODY MASS INDEX: 22.88 KG/M2 | HEIGHT: 58 IN | HEART RATE: 48 BPM | WEIGHT: 109 LBS

## 2022-08-25 DIAGNOSIS — E11.9 TYPE 2 DIABETES MELLITUS WITHOUT RETINOPATHY (HCC): ICD-10-CM

## 2022-08-25 DIAGNOSIS — I10 ESSENTIAL HYPERTENSION WITH GOAL BLOOD PRESSURE LESS THAN 140/90: ICD-10-CM

## 2022-08-25 DIAGNOSIS — Z86.73 HISTORY OF CVA (CEREBROVASCULAR ACCIDENT): ICD-10-CM

## 2022-08-25 DIAGNOSIS — R41.3 MEMORY LOSS: Primary | ICD-10-CM

## 2022-08-25 DIAGNOSIS — E78.00 HYPERCHOLESTEREMIA: ICD-10-CM

## 2022-08-25 PROCEDURE — 3008F BODY MASS INDEX DOCD: CPT | Performed by: FAMILY MEDICINE

## 2022-08-25 PROCEDURE — 99214 OFFICE O/P EST MOD 30 MIN: CPT | Performed by: FAMILY MEDICINE

## 2022-08-25 PROCEDURE — 3077F SYST BP >= 140 MM HG: CPT | Performed by: FAMILY MEDICINE

## 2022-08-25 PROCEDURE — 3078F DIAST BP <80 MM HG: CPT | Performed by: FAMILY MEDICINE

## 2022-08-25 RX ORDER — METFORMIN HYDROCHLORIDE 500 MG/1
TABLET, EXTENDED RELEASE ORAL
COMMUNITY
Start: 2022-05-18 | End: 2022-08-25

## 2022-08-29 ENCOUNTER — OFFICE VISIT (OUTPATIENT)
Dept: NEUROLOGY | Facility: CLINIC | Age: 82
End: 2022-08-29
Payer: COMMERCIAL

## 2022-08-29 ENCOUNTER — LAB ENCOUNTER (OUTPATIENT)
Dept: LAB | Facility: HOSPITAL | Age: 82
End: 2022-08-29
Attending: FAMILY MEDICINE
Payer: MEDICARE

## 2022-08-29 VITALS
BODY MASS INDEX: 22.88 KG/M2 | HEIGHT: 58 IN | WEIGHT: 109 LBS | DIASTOLIC BLOOD PRESSURE: 60 MMHG | SYSTOLIC BLOOD PRESSURE: 120 MMHG

## 2022-08-29 DIAGNOSIS — I63.10 CEREBROVASCULAR ACCIDENT (CVA) DUE TO EMBOLISM OF PRECEREBRAL ARTERY (HCC): Primary | ICD-10-CM

## 2022-08-29 DIAGNOSIS — F03.91 DEMENTIA WITH BEHAVIORAL DISTURBANCE, UNSPECIFIED DEMENTIA TYPE (HCC): ICD-10-CM

## 2022-08-29 LAB
ALBUMIN SERPL-MCNC: 3.7 G/DL (ref 3.4–5)
ALBUMIN/GLOB SERPL: 1.2 {RATIO} (ref 1–2)
ALP LIVER SERPL-CCNC: 77 U/L
ALT SERPL-CCNC: 35 U/L
ANION GAP SERPL CALC-SCNC: 4 MMOL/L (ref 0–18)
AST SERPL-CCNC: 26 U/L (ref 15–37)
BASOPHILS # BLD AUTO: 0.02 X10(3) UL (ref 0–0.2)
BASOPHILS NFR BLD AUTO: 0.5 %
BILIRUB SERPL-MCNC: 0.6 MG/DL (ref 0.1–2)
BUN BLD-MCNC: 11 MG/DL (ref 7–18)
BUN/CREAT SERPL: 11.6 (ref 10–20)
CALCIUM BLD-MCNC: 8.8 MG/DL (ref 8.5–10.1)
CHLORIDE SERPL-SCNC: 108 MMOL/L (ref 98–112)
CHOLEST SERPL-MCNC: 113 MG/DL (ref ?–200)
CO2 SERPL-SCNC: 30 MMOL/L (ref 21–32)
CREAT BLD-MCNC: 0.95 MG/DL
DEPRECATED RDW RBC AUTO: 38.8 FL (ref 35.1–46.3)
EOSINOPHIL # BLD AUTO: 0.13 X10(3) UL (ref 0–0.7)
EOSINOPHIL NFR BLD AUTO: 3 %
ERYTHROCYTE [DISTWIDTH] IN BLOOD BY AUTOMATED COUNT: 12.4 % (ref 11–15)
EST. AVERAGE GLUCOSE BLD GHB EST-MCNC: 134 MG/DL (ref 68–126)
FASTING PATIENT LIPID ANSWER: YES
FASTING STATUS PATIENT QL REPORTED: YES
GFR SERPLBLD BASED ON 1.73 SQ M-ARVRAT: 60 ML/MIN/1.73M2 (ref 60–?)
GLOBULIN PLAS-MCNC: 3.1 G/DL (ref 2.8–4.4)
GLUCOSE BLD-MCNC: 116 MG/DL (ref 70–99)
HBA1C MFR BLD: 6.3 % (ref ?–5.7)
HCT VFR BLD AUTO: 38.4 %
HDLC SERPL-MCNC: 44 MG/DL (ref 40–59)
HGB BLD-MCNC: 13.2 G/DL
IMM GRANULOCYTES # BLD AUTO: 0.01 X10(3) UL (ref 0–1)
IMM GRANULOCYTES NFR BLD: 0.2 %
LDLC SERPL CALC-MCNC: 45 MG/DL (ref ?–100)
LYMPHOCYTES # BLD AUTO: 1.48 X10(3) UL (ref 1–4)
LYMPHOCYTES NFR BLD AUTO: 34.2 %
MCH RBC QN AUTO: 30.2 PG (ref 26–34)
MCHC RBC AUTO-ENTMCNC: 34.4 G/DL (ref 31–37)
MCV RBC AUTO: 87.9 FL
MONOCYTES # BLD AUTO: 0.27 X10(3) UL (ref 0.1–1)
MONOCYTES NFR BLD AUTO: 6.2 %
NEUTROPHILS # BLD AUTO: 2.42 X10 (3) UL (ref 1.5–7.7)
NEUTROPHILS # BLD AUTO: 2.42 X10(3) UL (ref 1.5–7.7)
NEUTROPHILS NFR BLD AUTO: 55.9 %
NONHDLC SERPL-MCNC: 69 MG/DL (ref ?–130)
OSMOLALITY SERPL CALC.SUM OF ELEC: 294 MOSM/KG (ref 275–295)
PLATELET # BLD AUTO: 121 10(3)UL (ref 150–450)
POTASSIUM SERPL-SCNC: 3.9 MMOL/L (ref 3.5–5.1)
PROT SERPL-MCNC: 6.8 G/DL (ref 6.4–8.2)
RBC # BLD AUTO: 4.37 X10(6)UL
SODIUM SERPL-SCNC: 142 MMOL/L (ref 136–145)
TRIGL SERPL-MCNC: 138 MG/DL (ref 30–149)
TSI SER-ACNC: 3.54 MIU/ML (ref 0.36–3.74)
VIT B12 SERPL-MCNC: 640 PG/ML (ref 193–986)
VLDLC SERPL CALC-MCNC: 19 MG/DL (ref 0–30)
WBC # BLD AUTO: 4.3 X10(3) UL (ref 4–11)

## 2022-08-29 PROCEDURE — 80061 LIPID PANEL: CPT | Performed by: FAMILY MEDICINE

## 2022-08-29 PROCEDURE — 3078F DIAST BP <80 MM HG: CPT | Performed by: OTHER

## 2022-08-29 PROCEDURE — 3074F SYST BP LT 130 MM HG: CPT | Performed by: OTHER

## 2022-08-29 PROCEDURE — 84443 ASSAY THYROID STIM HORMONE: CPT | Performed by: FAMILY MEDICINE

## 2022-08-29 PROCEDURE — 80053 COMPREHEN METABOLIC PANEL: CPT | Performed by: FAMILY MEDICINE

## 2022-08-29 PROCEDURE — 36415 COLL VENOUS BLD VENIPUNCTURE: CPT | Performed by: FAMILY MEDICINE

## 2022-08-29 PROCEDURE — 3008F BODY MASS INDEX DOCD: CPT | Performed by: OTHER

## 2022-08-29 PROCEDURE — 85025 COMPLETE CBC W/AUTO DIFF WBC: CPT | Performed by: FAMILY MEDICINE

## 2022-08-29 PROCEDURE — 83036 HEMOGLOBIN GLYCOSYLATED A1C: CPT | Performed by: FAMILY MEDICINE

## 2022-08-29 PROCEDURE — 99214 OFFICE O/P EST MOD 30 MIN: CPT | Performed by: OTHER

## 2022-08-29 PROCEDURE — 82607 VITAMIN B-12: CPT | Performed by: FAMILY MEDICINE

## 2022-08-29 RX ORDER — DONEPEZIL HYDROCHLORIDE 5 MG/1
5 TABLET, FILM COATED ORAL NIGHTLY
Qty: 90 TABLET | Refills: 3 | Status: SHIPPED | OUTPATIENT
Start: 2022-08-29

## 2022-09-14 ENCOUNTER — HOSPITAL ENCOUNTER (OUTPATIENT)
Dept: MRI IMAGING | Facility: HOSPITAL | Age: 82
Discharge: HOME | End: 2022-09-14
Attending: Other
Payer: MEDICARE

## 2022-09-14 DIAGNOSIS — I63.10 CEREBROVASCULAR ACCIDENT (CVA) DUE TO EMBOLISM OF PRECEREBRAL ARTERY (HCC): ICD-10-CM

## 2022-09-14 PROCEDURE — 70551 MRI BRAIN STEM W/O DYE: CPT | Performed by: OTHER

## 2022-09-15 ENCOUNTER — TELEPHONE (OUTPATIENT)
Dept: NEUROLOGY | Facility: CLINIC | Age: 82
End: 2022-09-15

## 2022-10-05 DIAGNOSIS — F33.0 MILD RECURRENT MAJOR DEPRESSION (HCC): ICD-10-CM

## 2022-10-06 RX ORDER — ALENDRONATE SODIUM 70 MG/1
70 TABLET ORAL WEEKLY
Qty: 12 TABLET | Refills: 1 | Status: SHIPPED | OUTPATIENT
Start: 2022-10-06

## 2022-10-06 RX ORDER — PAROXETINE 10 MG/1
10 TABLET, FILM COATED ORAL DAILY
Qty: 90 TABLET | Refills: 1 | Status: SHIPPED | OUTPATIENT
Start: 2022-10-06

## 2022-10-06 NOTE — TELEPHONE ENCOUNTER
Refill passed per [facility] protocol.    Requested Prescriptions   Pending Prescriptions Disp Refills    PAROXETINE 10 MG Oral Tab [Pharmacy Med Name: PAROXETINE  10MG  TAB] 90 tablet 3     Sig: TAKE 1 TABLET BY MOUTH  DAILY        Psychiatric Non-Scheduled (Anti-Anxiety) Passed - 10/5/2022  9:54 PM        Passed - In person appointment or virtual visit in the past 6 mos or appointment in next 3 mos       Recent Outpatient Visits              1 month ago Cerebrovascular accident (CVA) due to embolism of precerebral artery [facility], [facility], Jeane Egan MD    Office Visit    1 month ago Memory loss    [facility], [facility], [address] [facility] [place], Elzbieta Salguero MD    Office Visit    1 month ago     [address] Damion Juarez OT    Office Visit    2 months ago     [address] AndrewsDione Laws, [address] Visit    2 months ago     [address] [employer], Dione Laws, [address] Visit                        Recent Outpatient Visits              1 month ago Cerebrovascular accident (CVA) due to embolism of precerebral artery [facility], [facility], [address] [facility], Summer Beal MD    Office Visit    1 month ago Memory loss    [facility], [facility], [address] [facility] [address], Elzbieta Salguero MD    Office Visit    1 month ago     [address]rendan, Dione Laws, [address] Visit    2 months ago     [address] [employer], [address] Visit    2 months ago     [address] Dione Rodriguez [place]    Office Visit

## 2022-10-07 NOTE — TELEPHONE ENCOUNTER
Refill passed per [facility] protocol   Requested Prescriptions   Pending Prescriptions Disp Refills    ALENDRONATE 70 MG Oral Tab [Pharmacy Med Name: Alendronate Sodium 70 MG Oral Tablet] 12 tablet 3     Sig: TAKE 1 TABLET BY MOUTH  WEEKLY WITH 8 OZ OF PLAIN  WATER 30 MINUTES BEFORE  FIRST FOOD, [address] OR MEDS.   STAY UPRIGHT FOR 30 MINS        Osteoporosis Medication Protocol Passed - 10/6/2022  9:08 PM        Passed - In person appointment or virtual visit in the past 12 mos or appointment in next 3 mos       Recent Outpatient Visits              1 month ago Cerebrovascular accident (CVA) due to embolism of precerebral artery [facility], [address], Marybeth Rios MD    Office Visit    1 month ago Memory loss    Joaquin Rhoades MD    Office Visit    1 month ago     [address] [place] [address], [employer], [address] [place] [address] Visit    2 months ago     [address] [place] [address] [employer], [address] [place] [address] Visit    2 months ago     [address] [place] [address], [facility]    Office Visit

## 2022-11-16 DIAGNOSIS — I10 ESSENTIAL HYPERTENSION WITH GOAL BLOOD PRESSURE LESS THAN 140/90: ICD-10-CM

## 2022-11-16 RX ORDER — METOPROLOL TARTRATE 50 MG/1
TABLET, FILM COATED ORAL
Qty: 180 TABLET | Refills: 1 | Status: SHIPPED | OUTPATIENT
Start: 2022-11-16

## 2022-11-16 NOTE — TELEPHONE ENCOUNTER
Refill passed per [facility] protocol.   Requested Prescriptions   Pending Prescriptions Disp Refills    METOPROLOL TARTRATE 50 MG Oral Tab [Pharmacy Med Name: Metoprolol Tartrate 50 MG Oral Tablet] 180 tablet 3     Sig: TAKE 1 TABLET BY MOUTH  TWICE DAILY       Hypertensive Medications Protocol Passed - 11/16/2022  3:45 PM        Passed - In person appointment in the past 12 or next 3 months     Recent Outpatient Visits              2 months ago Cerebrovascular accident (CVA) due to embolism of precerebral artery [facility], [address], Moose Newman MD    Office Visit    2 months ago Memory loss    Jin Fernández MD    Office Visit    3 months ago     [facility] Visit    3 months ago     [facility] Visit    3 months ago     [facility]    Office Visit                      Passed - Last BP reading less than 140/90     BP Readings from Last 1 Encounters:  08/29/22 : 120/60              Passed - CMP or BMP in past 6 months     Recent Results (from the past 4392 hour(s))   COMP METABOLIC PANEL (14)    Collection Time: 08/29/22 11:40 AM   Result Value Ref Range    Glucose 116 (H) 70 - 99 mg/dL    Sodium 142 136 - 145 mmol/L    Potassium 3.9 3.5 - 5.1 mmol/L    Chloride 108 98 - 112 mmol/L    CO2 30.0 21.0 - 32.0 mmol/L    Anion Gap 4 0 - 18 mmol/L    BUN 11 7 - 18 mg/dL    Creatinine 0.95 0.55 - 1.02 mg/dL    BUN/CREA Ratio 11.6 10.0 - 20.0    Calcium, Total 8.8 8.5 - 10.1 mg/dL    Calculated Osmolality 294 275 - 295 mOsm/kg    eGFR-Cr 60 >=60 mL/min/1.73m2    ALT 35 13 - 56 U/L    AST 26 15 - 37 U/L    Alkaline Phosphatase 77 55 - 142 U/L    Bilirubin, Total 0.6 0.1 - 2.0 mg/dL    Total Protein 6.8 6.4 - 8.2 g/dL    Albumin 3.7 3.4 - 5.0 g/dL Globulin  3.1 2.8 - 4.4 g/dL    A/G Ratio 1.2 1.0 - 2.0    Patient Fasting for CMP? Yes      *Note: Due to a large number of results and/or encounters for the requested time period, some results have not been displayed. A complete set of results can be found in Results Review.                Passed - In person appointment or virtual visit in the past 6 months     Recent Outpatient Visits              2 months ago Cerebrovascular accident (CVA) due to embolism of precerebral artery [facility])    [address], [address], Maria E Morales MD    Office Visit    2 months ago Memory loss    [address] [place], [address] [place], Álvaro Pathak MD    Office Visit    3 months ago     [facility], [facility], [facility] Visit    3 months ago     [address] [facility], [facility], [facility] Visit    3 months ago     [address] Damion Juarez, OT    Office Visit                      Passed [facility] or GFRNAA > 50     GFR Evaluation  EGFRCR: 60 , resulted on 8/29/2022

## 2023-01-02 NOTE — TELEPHONE ENCOUNTER
----- Message from Pearl Mullins MD sent at 6/16/2021 11:43 AM CDT -----  Please let patient know that MRI brain showed chronic strokes. No acute strokes. No evidence of any other significant problems.
Spoke to patient`s daughter Santosh Fleming. Marcial verified. Relayed information from Dr Sidhu Manual note 6/16/21. Transferred to [facility] to make [address]. Will need to be seen after completing neuropsych testing 6/27/21.
BO Deutsch

## 2023-01-10 ENCOUNTER — LAB ENCOUNTER (OUTPATIENT)
Dept: LAB | Age: 83
End: 2023-01-10
Attending: FAMILY MEDICINE
Payer: MEDICARE

## 2023-01-10 ENCOUNTER — OFFICE VISIT (OUTPATIENT)
Dept: FAMILY MEDICINE CLINIC | Facility: CLINIC | Age: 83
End: 2023-01-10
Payer: COMMERCIAL

## 2023-01-10 VITALS
HEART RATE: 65 BPM | WEIGHT: 109 LBS | DIASTOLIC BLOOD PRESSURE: 70 MMHG | HEIGHT: 58 IN | SYSTOLIC BLOOD PRESSURE: 126 MMHG | OXYGEN SATURATION: 98 % | BODY MASS INDEX: 22.88 KG/M2

## 2023-01-10 DIAGNOSIS — M81.0 OSTEOPOROSIS, UNSPECIFIED OSTEOPOROSIS TYPE, UNSPECIFIED PATHOLOGICAL FRACTURE PRESENCE: ICD-10-CM

## 2023-01-10 DIAGNOSIS — Z12.11 COLON CANCER SCREENING: ICD-10-CM

## 2023-01-10 DIAGNOSIS — Z86.73 HISTORY OF CVA (CEREBROVASCULAR ACCIDENT): ICD-10-CM

## 2023-01-10 DIAGNOSIS — I10 ESSENTIAL HYPERTENSION WITH GOAL BLOOD PRESSURE LESS THAN 140/90: ICD-10-CM

## 2023-01-10 DIAGNOSIS — Z23 FLU VACCINE NEED: ICD-10-CM

## 2023-01-10 DIAGNOSIS — E78.00 HYPERCHOLESTEREMIA: ICD-10-CM

## 2023-01-10 DIAGNOSIS — Z00.00 ENCOUNTER FOR ANNUAL HEALTH EXAMINATION: Primary | ICD-10-CM

## 2023-01-10 DIAGNOSIS — M54.41 CHRONIC BILATERAL LOW BACK PAIN WITH BILATERAL SCIATICA: ICD-10-CM

## 2023-01-10 DIAGNOSIS — M15.9 PRIMARY OSTEOARTHRITIS INVOLVING MULTIPLE JOINTS: ICD-10-CM

## 2023-01-10 DIAGNOSIS — F03.90 DEMENTIA WITHOUT BEHAVIORAL DISTURBANCE (HCC): ICD-10-CM

## 2023-01-10 DIAGNOSIS — M54.42 CHRONIC BILATERAL LOW BACK PAIN WITH BILATERAL SCIATICA: ICD-10-CM

## 2023-01-10 DIAGNOSIS — E11.9 TYPE 2 DIABETES MELLITUS WITHOUT RETINOPATHY (HCC): ICD-10-CM

## 2023-01-10 DIAGNOSIS — G89.29 CHRONIC BILATERAL LOW BACK PAIN WITH BILATERAL SCIATICA: ICD-10-CM

## 2023-01-10 LAB
ALBUMIN SERPL-MCNC: 3.8 G/DL (ref 3.4–5)
ALBUMIN/GLOB SERPL: 1.3 {RATIO} (ref 1–2)
ALP LIVER SERPL-CCNC: 94 U/L
ALT SERPL-CCNC: 29 U/L
ANION GAP SERPL CALC-SCNC: 6 MMOL/L (ref 0–18)
AST SERPL-CCNC: 26 U/L (ref 15–37)
BILIRUB SERPL-MCNC: 0.6 MG/DL (ref 0.1–2)
BUN BLD-MCNC: 17 MG/DL (ref 7–18)
BUN/CREAT SERPL: 16.5 (ref 10–20)
CALCIUM BLD-MCNC: 9.1 MG/DL (ref 8.5–10.1)
CHLORIDE SERPL-SCNC: 107 MMOL/L (ref 98–112)
CO2 SERPL-SCNC: 27 MMOL/L (ref 21–32)
CREAT BLD-MCNC: 1.03 MG/DL
EST. AVERAGE GLUCOSE BLD GHB EST-MCNC: 128 MG/DL (ref 68–126)
FASTING STATUS PATIENT QL REPORTED: NO
GFR SERPLBLD BASED ON 1.73 SQ M-ARVRAT: 55 ML/MIN/1.73M2 (ref 60–?)
GLOBULIN PLAS-MCNC: 3 G/DL (ref 2.8–4.4)
GLUCOSE BLD-MCNC: 167 MG/DL (ref 70–99)
HBA1C MFR BLD: 6.1 % (ref ?–5.7)
OSMOLALITY SERPL CALC.SUM OF ELEC: 295 MOSM/KG (ref 275–295)
POTASSIUM SERPL-SCNC: 3.7 MMOL/L (ref 3.5–5.1)
PROT SERPL-MCNC: 6.8 G/DL (ref 6.4–8.2)
SODIUM SERPL-SCNC: 140 MMOL/L (ref 136–145)
VIT D+METAB SERPL-MCNC: 36.3 NG/ML (ref 30–100)

## 2023-01-10 PROCEDURE — 36415 COLL VENOUS BLD VENIPUNCTURE: CPT | Performed by: FAMILY MEDICINE

## 2023-01-10 PROCEDURE — G0439 PPPS, SUBSEQ VISIT: HCPCS | Performed by: FAMILY MEDICINE

## 2023-01-10 PROCEDURE — 90662 IIV NO PRSV INCREASED AG IM: CPT | Performed by: FAMILY MEDICINE

## 2023-01-10 PROCEDURE — 83036 HEMOGLOBIN GLYCOSYLATED A1C: CPT | Performed by: FAMILY MEDICINE

## 2023-01-10 PROCEDURE — 3008F BODY MASS INDEX DOCD: CPT | Performed by: FAMILY MEDICINE

## 2023-01-10 PROCEDURE — 80053 COMPREHEN METABOLIC PANEL: CPT | Performed by: FAMILY MEDICINE

## 2023-01-10 PROCEDURE — 82306 VITAMIN D 25 HYDROXY: CPT | Performed by: FAMILY MEDICINE

## 2023-01-10 PROCEDURE — 96160 PT-FOCUSED HLTH RISK ASSMT: CPT | Performed by: FAMILY MEDICINE

## 2023-01-10 PROCEDURE — G0008 ADMIN INFLUENZA VIRUS VAC: HCPCS | Performed by: FAMILY MEDICINE

## 2023-01-10 PROCEDURE — 3078F DIAST BP <80 MM HG: CPT | Performed by: FAMILY MEDICINE

## 2023-01-10 PROCEDURE — 1125F AMNT PAIN NOTED PAIN PRSNT: CPT | Performed by: FAMILY MEDICINE

## 2023-01-10 PROCEDURE — 99397 PER PM REEVAL EST PAT 65+ YR: CPT | Performed by: FAMILY MEDICINE

## 2023-01-10 PROCEDURE — 3074F SYST BP LT 130 MM HG: CPT | Performed by: FAMILY MEDICINE

## 2023-01-10 RX ORDER — MULTIVITAMIN WITH IRON
250 TABLET ORAL
COMMUNITY

## 2023-01-10 NOTE — PATIENT INSTRUCTIONS
As of October 6th 2014, the Drug Enforcement Agency [facility]) is reclassifying all hydrocodone combination medications from Schedule III to Schedule II. This includes medications such as Norco, Vicodin, Lortab, Zohydro, and Vicoprofen.     What this means for y To Endocrinology    Do you have a form for patient on  license renewal    Patient is due for renewal and Dr Armas thought Phu was completing    Please advise    Also no need to Dr Armas to sign

## 2023-01-20 LAB — HEMOCCULT STL QL: NEGATIVE

## 2023-01-20 PROCEDURE — 82274 ASSAY TEST FOR BLOOD FECAL: CPT | Performed by: FAMILY MEDICINE

## 2023-03-01 ENCOUNTER — PATIENT MESSAGE (OUTPATIENT)
Dept: FAMILY MEDICINE CLINIC | Facility: CLINIC | Age: 83
End: 2023-03-01

## 2023-03-01 DIAGNOSIS — E11.9 TYPE 2 DIABETES MELLITUS WITHOUT RETINOPATHY (HCC): ICD-10-CM

## 2023-03-01 DIAGNOSIS — Z76.89 ENCOUNTER FOR NAIL CARE: Primary | ICD-10-CM

## 2023-03-01 NOTE — TELEPHONE ENCOUNTER
From: Deanna Montiel  To: 59 Julieta Pan MD  Sent: 3/1/2023 10:41 AM CST  Subject: Nails    Hello! I wanted to know if your office does foot nail cuts for seniors?

## 2023-03-14 RX ORDER — ALENDRONATE SODIUM 70 MG/1
TABLET ORAL
Qty: 12 TABLET | Refills: 3 | Status: SHIPPED | OUTPATIENT
Start: 2023-03-14

## 2023-03-14 NOTE — TELEPHONE ENCOUNTER
Refill passed per [employer], [facility] protocol    Requested Prescriptions   Pending Prescriptions Disp Refills    ALENDRONATE 70 MG Oral Tab [Pharmacy Med Name: Alendronate Sodium 70 MG Oral Tablet] 12 tablet 3     Sig: TAKE 1 TABLET BY MOUTH  WEEKLY WITH 8 OZ OF PLAIN  WATER 30 MINUTES BEFORE  FIRST FOOD, [address] OR MEDS.   STAY UPRIGHT FOR 30 MINS       Osteoporosis Medication Protocol Passed - 3/13/2023 10:10 PM        Passed - In person appointment or virtual visit in the past 12 mos or appointment in next 3 mos     Recent Outpatient Visits              2 months ago Encounter for annual health examination    So Chandra MD    Office Visit    6 months ago Cerebrovascular accident (CVA) due to embolism of precerebral artery [facility])    Andrade Ngo, [address], Lonnie Parsons MD    Office Visit    6 months ago Memory loss    So Chandra MD    Office Visit    7 months ago     [address], Aron Pain, OT    Office Visit    7 months ago     [facility] [facility], Aron Pain, OT    Office Visit          Future Appointments         Provider Department Appt Notes    In 1 week Diana Mayfield MD [address], [address] [facility] follow up

## 2023-03-19 DIAGNOSIS — F33.0 MILD RECURRENT MAJOR DEPRESSION (HCC): ICD-10-CM

## 2023-03-20 RX ORDER — PAROXETINE 10 MG/1
10 TABLET, FILM COATED ORAL DAILY
Qty: 90 TABLET | Refills: 3 | Status: SHIPPED | OUTPATIENT
Start: 2023-03-20

## 2023-03-21 NOTE — TELEPHONE ENCOUNTER
Refill passed per [place] [employer], [facility] protocol.      Requested Prescriptions   Pending Prescriptions Disp Refills    PAROXETINE 10 MG Oral Tab [Pharmacy Med Name: PARoxetine HCl 10 MG Oral Tablet] 90 tablet 3     Sig: TAKE 1 TABLET BY MOUTH  DAILY       Psychiatric Non-Scheduled (Anti-Anxiety) Passed - 3/19/2023  9:44 PM        Passed - In person appointment or virtual visit in the past 6 mos or appointment in next 3 mos     Recent Outpatient Visits              2 months ago Encounter for annual health examination    [place] Rodríguez MD    Office Visit    6 months ago Cerebrovascular accident (CVA) due to embolism of precerebral artery [facility])    [facility] [place] Denis Mccarthy MD    Office Visit    6 months ago Memory loss    [place] Rodríguez MD    Office Visit    7 months ago     [facility], OT    Office Visit    7 months ago     [address] [place] [address], OT    Office Visit          Future Appointments         Provider Department Appt Notes    In 3 days Anirudh Yu MD [address], [address] [facility],[address]Rd Floor, [facility] follow up                     Future Appointments         Provider Department Appt Notes    In 3 days Anirudh Yu MD [address], [address] [facility],[address] [facility], [place] follow up             Recent Outpatient Visits              2 months ago Encounter for annual health examination    [place] Rodríguez MD    Office Visit    6 months ago Cerebrovascular accident (CVA) due to embolism of precerebral artery [facility])    Jayleen Watkins [facility], Denis Mccarthy MD    Office Visit    6 months ago Memory loss    [address], [facility] Arora Nisa Segal MD    Office Visit    7 months ago     [address] [place] [address]    Office Visit    7 months ago     [address] [place] [address]    Office Visit

## 2023-03-23 ENCOUNTER — OFFICE VISIT (OUTPATIENT)
Dept: NEUROLOGY | Facility: CLINIC | Age: 83
End: 2023-03-23
Payer: COMMERCIAL

## 2023-03-23 VITALS
BODY MASS INDEX: 22.88 KG/M2 | WEIGHT: 109 LBS | HEIGHT: 58 IN | SYSTOLIC BLOOD PRESSURE: 122 MMHG | DIASTOLIC BLOOD PRESSURE: 80 MMHG | HEART RATE: 70 BPM

## 2023-03-23 DIAGNOSIS — I63.10 CEREBROVASCULAR ACCIDENT (CVA) DUE TO EMBOLISM OF PRECEREBRAL ARTERY (HCC): Primary | ICD-10-CM

## 2023-03-23 PROCEDURE — 3079F DIAST BP 80-89 MM HG: CPT | Performed by: OTHER

## 2023-03-23 PROCEDURE — 3074F SYST BP LT 130 MM HG: CPT | Performed by: OTHER

## 2023-03-23 PROCEDURE — 3008F BODY MASS INDEX DOCD: CPT | Performed by: OTHER

## 2023-03-23 PROCEDURE — 99214 OFFICE O/P EST MOD 30 MIN: CPT | Performed by: OTHER

## 2023-03-23 RX ORDER — DONEPEZIL HYDROCHLORIDE 5 MG/1
5 TABLET, FILM COATED ORAL NIGHTLY
Qty: 90 TABLET | Refills: 3 | Status: SHIPPED | OUTPATIENT
Start: 2023-03-23

## 2023-03-27 ENCOUNTER — PATIENT MESSAGE (OUTPATIENT)
Dept: NEUROLOGY | Facility: CLINIC | Age: 83
End: 2023-03-27

## 2023-03-27 NOTE — TELEPHONE ENCOUNTER
Please advise if the patient should proceed with the asa 325mg and donepezil. Interaction: may increase your risk for stomach/intestinal bleeding.

## 2023-03-27 NOTE — TELEPHONE ENCOUNTER
From: Pancho Becerra  To: Vijay Siddiqui MD  Sent: 3/27/2023 2:18 PM CDT  Subject: Donepezil    Hello,   I read that this medicine should not be used with aspirin. My mom takes 325 mg aspirin daily. Please advise,    Thanks!

## 2023-03-28 NOTE — TELEPHONE ENCOUNTER
It is okay to take them both. There is always a risk of stomach bleeding with aspirin.   However the risk outweighed the benefit

## 2023-04-13 RX ORDER — FAMOTIDINE 20 MG/1
20 TABLET, FILM COATED ORAL DAILY
Qty: 90 TABLET | Refills: 3 | Status: SHIPPED | OUTPATIENT
Start: 2023-04-13

## 2023-04-13 NOTE — TELEPHONE ENCOUNTER
Refill passed per [place] [employer] [place], [facility] protocol.     Requested Prescriptions   Pending Prescriptions Disp Refills    FAMOTIDINE 20 MG Oral Tab [Pharmacy Med Name: Famotidine 20 MG Oral Tablet] 90 tablet 3     Sig: TAKE 1 TABLET BY MOUTH DAILY       Gastrointestional Medication Protocol Passed - 4/13/2023  4:43 AM        Passed - In person appointment or virtual visit in the past 12 mos or appointment in next 3 mos     Recent Outpatient Visits              3 weeks ago Cerebrovascular accident (CVA) due to embolism of precerebral artery ([facility]    [address], [address], Bess Jay MD    Office Visit    3 months ago Encounter for annual health examination    Анна Byrnes MD    Office Visit    7 months ago Cerebrovascular accident (CVA) due to embolism of precerebral artery [facility])    Mary Nickerson Beverly Knudsen, MD    Office Visit    7 months ago Memory loss    [facility], Faisal Avilez MD    Office Visit    8 months ago     [address] [place] [address], [facility], OT    Office Visit          Future Appointments         Provider Department Appt Notes    In 2 months Gavi Gonzalez MD [address], [address], [place] 3 mos f/u                  Recent Outpatient Visits              3 weeks ago Cerebrovascular accident (CVA) due to embolism of precerebral artery ([facility]    [address], [address], Bess Jay MD    Office Visit    3 months ago Encounter for annual health examination    Анна Byrnes MD    Office Visit    7 months ago Cerebrovascular accident (CVA) due to embolism of precerebral artery [facility])    Mary Nickerson Beverly Knudsen, MD    Office Visit 7 months ago Memory loss    Alena Mauricio MD    Office Visit    8 months ago     [id number]  [facility] [place]    Office Visit          Future Appointments         Provider Department Appt Notes    In 2 months Hanny Mallory MD [address], [address] [facility] [address] 3 mos f/u

## 2023-04-25 ENCOUNTER — HOSPITAL ENCOUNTER (EMERGENCY)
Facility: HOSPITAL | Age: 83
Discharge: HOME | End: 2023-04-25
Attending: EMERGENCY MEDICINE
Payer: MEDICARE

## 2023-04-25 ENCOUNTER — APPOINTMENT (OUTPATIENT)
Dept: CT IMAGING | Facility: HOSPITAL | Age: 83
End: 2023-04-25
Attending: EMERGENCY MEDICINE
Payer: MEDICARE

## 2023-04-25 VITALS
RESPIRATION RATE: 21 BRPM | TEMPERATURE: 99 F | BODY MASS INDEX: 27 KG/M2 | WEIGHT: 130 LBS | SYSTOLIC BLOOD PRESSURE: 138 MMHG | OXYGEN SATURATION: 92 % | HEART RATE: 54 BPM | DIASTOLIC BLOOD PRESSURE: 61 MMHG

## 2023-04-25 DIAGNOSIS — R42 VERTIGO: ICD-10-CM

## 2023-04-25 DIAGNOSIS — R10.13 EPIGASTRIC PAIN: ICD-10-CM

## 2023-04-25 DIAGNOSIS — G44.209 TENSION HEADACHE: Primary | ICD-10-CM

## 2023-04-25 LAB
ALBUMIN SERPL-MCNC: 3.8 G/DL (ref 3.4–5)
ALP LIVER SERPL-CCNC: 95 U/L
ALT SERPL-CCNC: 32 U/L
ANION GAP SERPL CALC-SCNC: 8 MMOL/L (ref 0–18)
AST SERPL-CCNC: 32 U/L (ref 15–37)
ATRIAL RATE: 61 BPM
BASOPHILS # BLD AUTO: 0.01 X10(3) UL (ref 0–0.2)
BASOPHILS NFR BLD AUTO: 0.2 %
BILIRUB DIRECT SERPL-MCNC: 0.2 MG/DL (ref 0–0.2)
BILIRUB SERPL-MCNC: 0.6 MG/DL (ref 0.1–2)
BUN BLD-MCNC: 13 MG/DL (ref 7–18)
BUN/CREAT SERPL: 14.8 (ref 10–20)
CALCIUM BLD-MCNC: 9.2 MG/DL (ref 8.5–10.1)
CHLORIDE SERPL-SCNC: 107 MMOL/L (ref 98–112)
CO2 SERPL-SCNC: 28 MMOL/L (ref 21–32)
CREAT BLD-MCNC: 0.88 MG/DL
DEPRECATED RDW RBC AUTO: 37.5 FL (ref 35.1–46.3)
EOSINOPHIL # BLD AUTO: 0.12 X10(3) UL (ref 0–0.7)
EOSINOPHIL NFR BLD AUTO: 2 %
ERYTHROCYTE [DISTWIDTH] IN BLOOD BY AUTOMATED COUNT: 12.1 % (ref 11–15)
GFR SERPLBLD BASED ON 1.73 SQ M-ARVRAT: 66 ML/MIN/1.73M2 (ref 60–?)
GLUCOSE BLD-MCNC: 208 MG/DL (ref 70–99)
HCT VFR BLD AUTO: 40.8 %
HGB BLD-MCNC: 14.1 G/DL
IMM GRANULOCYTES # BLD AUTO: 0.02 X10(3) UL (ref 0–1)
IMM GRANULOCYTES NFR BLD: 0.3 %
LIPASE SERPL-CCNC: 190 U/L (ref 13–75)
LYMPHOCYTES # BLD AUTO: 1.26 X10(3) UL (ref 1–4)
LYMPHOCYTES NFR BLD AUTO: 21.5 %
MCH RBC QN AUTO: 29.5 PG (ref 26–34)
MCHC RBC AUTO-ENTMCNC: 34.6 G/DL (ref 31–37)
MCV RBC AUTO: 85.4 FL
MONOCYTES # BLD AUTO: 0.39 X10(3) UL (ref 0.1–1)
MONOCYTES NFR BLD AUTO: 6.7 %
NEUTROPHILS # BLD AUTO: 4.06 X10 (3) UL (ref 1.5–7.7)
NEUTROPHILS # BLD AUTO: 4.06 X10(3) UL (ref 1.5–7.7)
NEUTROPHILS NFR BLD AUTO: 69.3 %
OSMOLALITY SERPL CALC.SUM OF ELEC: 302 MOSM/KG (ref 275–295)
P AXIS: 56 DEGREES
P-R INTERVAL: 222 MS
PLATELET # BLD AUTO: 132 10(3)UL (ref 150–450)
POTASSIUM SERPL-SCNC: 3.5 MMOL/L (ref 3.5–5.1)
PROT SERPL-MCNC: 7.2 G/DL (ref 6.4–8.2)
Q-T INTERVAL: 404 MS
QRS DURATION: 84 MS
QTC CALCULATION (BEZET): 406 MS
R AXIS: 24 DEGREES
RBC # BLD AUTO: 4.78 X10(6)UL
SODIUM SERPL-SCNC: 143 MMOL/L (ref 136–145)
T AXIS: 86 DEGREES
VENTRICULAR RATE: 61 BPM
WBC # BLD AUTO: 5.9 X10(3) UL (ref 4–11)

## 2023-04-25 PROCEDURE — 70450 CT HEAD/BRAIN W/O DYE: CPT | Performed by: EMERGENCY MEDICINE

## 2023-04-25 PROCEDURE — 96374 THER/PROPH/DIAG INJ IV PUSH: CPT

## 2023-04-25 PROCEDURE — 93005 ELECTROCARDIOGRAM TRACING: CPT

## 2023-04-25 PROCEDURE — 96361 HYDRATE IV INFUSION ADD-ON: CPT

## 2023-04-25 PROCEDURE — 80048 BASIC METABOLIC PNL TOTAL CA: CPT | Performed by: EMERGENCY MEDICINE

## 2023-04-25 PROCEDURE — 99284 EMERGENCY DEPT VISIT MOD MDM: CPT

## 2023-04-25 PROCEDURE — 80076 HEPATIC FUNCTION PANEL: CPT | Performed by: EMERGENCY MEDICINE

## 2023-04-25 PROCEDURE — C9113 INJ PANTOPRAZOLE SODIUM, VIA: HCPCS | Performed by: EMERGENCY MEDICINE

## 2023-04-25 PROCEDURE — 99285 EMERGENCY DEPT VISIT HI MDM: CPT

## 2023-04-25 PROCEDURE — 96375 TX/PRO/DX INJ NEW DRUG ADDON: CPT

## 2023-04-25 PROCEDURE — 83690 ASSAY OF LIPASE: CPT | Performed by: EMERGENCY MEDICINE

## 2023-04-25 PROCEDURE — 93010 ELECTROCARDIOGRAM REPORT: CPT

## 2023-04-25 PROCEDURE — 85025 COMPLETE CBC W/AUTO DIFF WBC: CPT | Performed by: EMERGENCY MEDICINE

## 2023-04-25 RX ORDER — ACETAMINOPHEN 325 MG/1
650 TABLET ORAL ONCE
Status: COMPLETED | OUTPATIENT
Start: 2023-04-25 | End: 2023-04-25

## 2023-04-25 RX ORDER — ONDANSETRON 2 MG/ML
4 INJECTION INTRAMUSCULAR; INTRAVENOUS ONCE
Status: COMPLETED | OUTPATIENT
Start: 2023-04-25 | End: 2023-04-25

## 2023-04-25 RX ORDER — ONDANSETRON 4 MG/1
4 TABLET, ORALLY DISINTEGRATING ORAL EVERY 8 HOURS PRN
Qty: 10 TABLET | Refills: 0 | Status: SHIPPED | OUTPATIENT
Start: 2023-04-25 | End: 2023-05-02

## 2023-04-25 RX ORDER — MECLIZINE HYDROCHLORIDE 25 MG/1
25 TABLET ORAL ONCE
Status: COMPLETED | OUTPATIENT
Start: 2023-04-25 | End: 2023-04-25

## 2023-04-25 NOTE — ED INITIAL ASSESSMENT (HPI)
Headache since yesterday. Also dizzy and abdominal pain. Hx stroke 2019 with r side deficits. Pt states the headache is in the same spot as the stroke and feels similar.

## 2023-05-05 RX ORDER — AMLODIPINE BESYLATE 5 MG/1
5 TABLET ORAL NIGHTLY
Qty: 90 TABLET | Refills: 3 | Status: SHIPPED | OUTPATIENT
Start: 2023-05-05

## 2023-05-05 NOTE — TELEPHONE ENCOUNTER
Refill passed per [employer], [employer] protocol.     Requested Prescriptions   Pending Prescriptions Disp Refills    AMLODIPINE 5 MG Oral Tab [Pharmacy Med Name: amLODIPine Besylate 5 MG Oral Tablet] 100 tablet 2     Sig: TAKE 1 TABLET VIA G-TUBE AT NIGHT       Hypertensive Medications Protocol Passed - 5/4/2023  9:05 PM        Passed - In person appointment in the past 12 or next 3 months     Recent Outpatient Visits              1 month ago Cerebrovascular accident (CVA) due to embolism of precerebral artery ([facility]    [address], [address], Pedro Alatorre MD    Office Visit    3 months ago Encounter for annual health examination    Sadie Dahl MD    Office Visit    8 months ago Cerebrovascular accident (CVA) due to embolism of precerebral artery [facility])    Patrick Oswald MD    Office Visit    8 months ago Memory loss    Sadie Dahl MD    Office Visit    8 months ago     [address] [place] [address]    Office Visit          Future Appointments         Provider Department Appt Notes    In 1 month Parrish Farr MD [address], [address], [place] 3 mos f/u               Passed - Last BP reading less than 140/90     BP Readings from Last 1 Encounters:  04/25/23 : 138/61                Passed - CMP or BMP in past 6 months     Recent Results (from the past 4392 hour(s))   Basic Metabolic Panel (8)    Collection Time: 04/25/23 12:32 AM   Result Value Ref Range    Glucose 208 (H) 70 - 99 mg/dL    Sodium 143 136 - 145 mmol/L    Potassium 3.5 3.5 - 5.1 mmol/L    Chloride 107 98 - 112 mmol/L    CO2 28.0 21.0 - 32.0 mmol/L    Anion Gap 8 0 - 18 mmol/L    BUN 13 7 - 18 mg/dL    Creatinine 0.88 0.55 - 1.02 mg/dL    BUN/CREA Ratio 14.8 10.0 - 20.0    Calcium, Total 9.2 8.5 - 10.1 mg/dL    Calculated Osmolality 302 (H) 275 - 295 mOsm/kg    eGFR-Cr 66 >=60 mL/min/1.73m2     *Note: Due to a large number of results and/or encounters for the requested time period, some results have not been displayed. A complete set of results can be found in Results Review.                  Passed - In person appointment or virtual visit in the past 6 months     Recent Outpatient Visits              1 month ago Cerebrovascular accident (CVA) due to embolism of precerebral artery ([facility]    [address], [address] [facility] [address], Hakan Green MD    Office Visit    3 months ago Encounter for annual health examination    Cleola Paget, MD    Office Visit    8 months ago Cerebrovascular accident (CVA) due to embolism of precerebral artery [facility])    Canelo Sanon, Alessandro Lewis, Hakan Boswell MD    Office Visit    8 months ago Memory loss    [facility] [facility], Juanito Woodruff MD    Office Visit    8 months ago     [facility], Malorie Huston, OT    Office Visit          Future Appointments         Provider Department Appt Notes    In 1 month Sawyer Brown MD [address] [place] [address], [place] 3 mos f/u               Passed - [facility] or GFRNAA > 50     GFR Evaluation  EGFRCR: 66 , resulted on 4/25/2023               Recent Outpatient Visits              1 month ago Cerebrovascular accident (CVA) due to [facility] of [facility])    [address] [facility], Hakan Green MD    Office Visit    3 months ago Encounter for annual health examination    Cleola Paget, MD    Office Visit    8 months ago Cerebrovascular accident (CVA) due to embolism of [facility])    [id number] [place] [address] Berkley Tavarez MD    Office Visit    8 months ago Memory loss    Kathy Luz MD    Office Visit    8 months ago     [address] [facility], [place], [place]    Office Visit          Future Appointments         Provider Department Appt Notes    In 1 month Blake Ledesma MD [address], [address] [facility] [address] 3 mos f/u

## 2023-06-26 DIAGNOSIS — E11.65 UNCONTROLLED TYPE 2 DIABETES MELLITUS WITH HYPERGLYCEMIA (HCC): ICD-10-CM

## 2023-06-27 RX ORDER — METFORMIN HYDROCHLORIDE 500 MG/1
TABLET, EXTENDED RELEASE ORAL
Qty: 90 TABLET | Refills: 3 | OUTPATIENT
Start: 2023-06-27

## 2023-07-19 RX ORDER — ATORVASTATIN CALCIUM 80 MG/1
80 TABLET, FILM COATED ORAL NIGHTLY
Qty: 100 TABLET | Refills: 2 | OUTPATIENT
Start: 2023-07-19

## 2023-07-19 NOTE — TELEPHONE ENCOUNTER
Pharmacy    [facility] ) - [place], [place] - Ximena Lerma [phone], [phone]          Disp Refills Start End    atorvastatin 80 MG Oral Tab 90 tablet 1 4/23/2023     Sig - Route: Take 1 tablet (80 mg total) by mouth nightly. - Oral    Sent to pharmacy as:  Atorvastatin Calcium 80 MG Oral Tablet (Lipitor)    Notes to Pharmacy: Requesting 1 year supply    E-Prescribing Status: Receipt confirmed by pharmacy (4/23/2023  2:10 PM CDT)

## 2023-10-02 RX ORDER — ATORVASTATIN CALCIUM 80 MG/1
80 TABLET, FILM COATED ORAL NIGHTLY
Qty: 90 TABLET | Refills: 0 | Status: SHIPPED | OUTPATIENT
Start: 2023-10-02

## 2023-10-03 NOTE — TELEPHONE ENCOUNTER
Please review; protocol failed/no protocol.      Requested Prescriptions   Pending Prescriptions Disp Refills    ATORVASTATIN 80 MG Oral Tab [Pharmacy Med Name: Atorvastatin Calcium 80 MG Oral Tablet] 80 tablet 3     Sig: TAKE 1 TABLET BY MOUTH AT NIGHT       Cholesterol Medication Protocol Failed - 10/1/2023  9:51 PM        Failed - LDL in past 12 months        Failed - Last LDL < 130     Lab Results   Component Value Date    LDL 45 08/29/2022             Passed - ALT in past 12 months        Passed - Last ALT < 80     Lab Results   Component Value Date    ALT 32 04/25/2023             Passed - In person appointment or virtual visit in the past 12 mos or appointment in next 3 mos     Recent Outpatient Visits              6 months ago Cerebrovascular accident (CVA) due to embolism of precerebral artery ([facility]    [facility], Shilo Hogan MD    Office Visit    8 months ago Encounter for annual health examination    [facility], Claudia Boyce MD    Office Visit    1 year ago Cerebrovascular accident (CVA) due to embolism of precerebral artery [facility])    Damon Nair, Elaine Grider MD    Office Visit    1 year ago Memory loss    [facility], Claudia Boyce MD    Office Visit    1 year ago     [facility], Kbenhramy K, OT    Office Visit                            Recent Outpatient Visits              6 months ago Cerebrovascular accident (CVA) due to embolism of precerebral artery [facility])    [address] [facility], Kami Willis, Elaine Grider MD    Office Visit    8 months ago Encounter for annual health examination    Ej Berrios MD    Office Visit    1 year ago Cerebrovascular accident (CVA) due to embolism of precerebral artery [facility])    Nori Degroot MD    Office Visit    1 year ago Memory loss    Efra De Souza MD    Office Visit    1 year ago     [address] [place] [address]    Office Visit

## 2023-12-06 ENCOUNTER — OFFICE VISIT (OUTPATIENT)
Dept: FAMILY MEDICINE CLINIC | Facility: CLINIC | Age: 83
End: 2023-12-06

## 2023-12-06 VITALS
WEIGHT: 110 LBS | HEART RATE: 59 BPM | SYSTOLIC BLOOD PRESSURE: 119 MMHG | BODY MASS INDEX: 23.09 KG/M2 | HEIGHT: 58 IN | TEMPERATURE: 97 F | DIASTOLIC BLOOD PRESSURE: 64 MMHG

## 2023-12-06 DIAGNOSIS — Z12.11 COLON CANCER SCREENING: ICD-10-CM

## 2023-12-06 DIAGNOSIS — Z00.00 ENCOUNTER FOR ANNUAL HEALTH EXAMINATION: Primary | ICD-10-CM

## 2023-12-06 DIAGNOSIS — I10 ESSENTIAL HYPERTENSION WITH GOAL BLOOD PRESSURE LESS THAN 140/90: ICD-10-CM

## 2023-12-06 DIAGNOSIS — E11.9 TYPE 2 DIABETES MELLITUS WITHOUT RETINOPATHY (HCC): ICD-10-CM

## 2023-12-06 DIAGNOSIS — M15.9 PRIMARY OSTEOARTHRITIS INVOLVING MULTIPLE JOINTS: ICD-10-CM

## 2023-12-06 DIAGNOSIS — D22.9 CHANGE IN MULTIPLE NEVI: ICD-10-CM

## 2023-12-06 DIAGNOSIS — M81.0 OSTEOPOROSIS, UNSPECIFIED OSTEOPOROSIS TYPE, UNSPECIFIED PATHOLOGICAL FRACTURE PRESENCE: ICD-10-CM

## 2023-12-06 DIAGNOSIS — F33.0 MILD RECURRENT MAJOR DEPRESSION (HCC): ICD-10-CM

## 2023-12-06 DIAGNOSIS — E78.00 HYPERCHOLESTEREMIA: ICD-10-CM

## 2023-12-06 DIAGNOSIS — F03.90 DEMENTIA WITHOUT BEHAVIORAL DISTURBANCE (HCC): ICD-10-CM

## 2023-12-06 DIAGNOSIS — K21.9 GASTROESOPHAGEAL REFLUX DISEASE, UNSPECIFIED WHETHER ESOPHAGITIS PRESENT: ICD-10-CM

## 2023-12-06 DIAGNOSIS — Z23 FLU VACCINE NEED: ICD-10-CM

## 2023-12-06 DIAGNOSIS — Z86.73 HISTORY OF CVA (CEREBROVASCULAR ACCIDENT): ICD-10-CM

## 2023-12-06 RX ORDER — METOPROLOL TARTRATE 50 MG/1
50 TABLET, FILM COATED ORAL 2 TIMES DAILY
Qty: 180 TABLET | Refills: 3 | Status: SHIPPED | OUTPATIENT
Start: 2023-12-06

## 2023-12-06 RX ORDER — PAROXETINE 10 MG/1
10 TABLET, FILM COATED ORAL DAILY
Qty: 90 TABLET | Refills: 3 | Status: SHIPPED | OUTPATIENT
Start: 2023-12-06

## 2023-12-06 RX ORDER — ATORVASTATIN CALCIUM 80 MG/1
80 TABLET, FILM COATED ORAL NIGHTLY
Qty: 90 TABLET | Refills: 3 | Status: SHIPPED | OUTPATIENT
Start: 2023-12-06

## 2023-12-06 RX ORDER — FAMOTIDINE 20 MG/1
20 TABLET, FILM COATED ORAL DAILY
Qty: 90 TABLET | Refills: 3 | Status: SHIPPED | OUTPATIENT
Start: 2023-12-06

## 2023-12-06 RX ORDER — ATORVASTATIN CALCIUM 80 MG/1
80 TABLET, FILM COATED ORAL NIGHTLY
Qty: 90 TABLET | Refills: 3 | OUTPATIENT
Start: 2023-12-06

## 2023-12-11 PROCEDURE — 82274 ASSAY TEST FOR BLOOD FECAL: CPT | Performed by: FAMILY MEDICINE

## 2023-12-12 ENCOUNTER — LAB ENCOUNTER (OUTPATIENT)
Dept: LAB | Facility: HOSPITAL | Age: 83
End: 2023-12-12
Attending: FAMILY MEDICINE
Payer: MEDICARE

## 2023-12-13 DIAGNOSIS — Z12.11 SPECIAL SCREENING FOR MALIGNANT NEOPLASMS, COLON: Primary | ICD-10-CM

## 2023-12-16 ENCOUNTER — LAB ENCOUNTER (OUTPATIENT)
Dept: LAB | Facility: HOSPITAL | Age: 83
End: 2023-12-16
Attending: FAMILY MEDICINE
Payer: MEDICARE

## 2023-12-16 DIAGNOSIS — Z12.11 SPECIAL SCREENING FOR MALIGNANT NEOPLASMS, COLON: ICD-10-CM

## 2023-12-16 DIAGNOSIS — M81.0 OSTEOPOROSIS, UNSPECIFIED OSTEOPOROSIS TYPE, UNSPECIFIED PATHOLOGICAL FRACTURE PRESENCE: ICD-10-CM

## 2023-12-16 LAB
ALBUMIN SERPL-MCNC: 4.3 G/DL (ref 3.2–4.8)
ALBUMIN/GLOB SERPL: 1.7 {RATIO} (ref 1–2)
ALP LIVER SERPL-CCNC: 82 U/L
ALT SERPL-CCNC: 25 U/L
ANION GAP SERPL CALC-SCNC: 4 MMOL/L (ref 0–18)
AST SERPL-CCNC: 30 U/L (ref ?–34)
BASOPHILS # BLD AUTO: 0.03 X10(3) UL (ref 0–0.2)
BASOPHILS NFR BLD AUTO: 0.7 %
BILIRUB SERPL-MCNC: 0.9 MG/DL (ref 0.2–1.1)
BUN BLD-MCNC: 9 MG/DL (ref 9–23)
BUN/CREAT SERPL: 8.7 (ref 10–20)
CALCIUM BLD-MCNC: 9.3 MG/DL (ref 8.7–10.4)
CHLORIDE SERPL-SCNC: 109 MMOL/L (ref 98–112)
CHOLEST SERPL-MCNC: 120 MG/DL (ref ?–200)
CO2 SERPL-SCNC: 29 MMOL/L (ref 21–32)
CREAT BLD-MCNC: 1.04 MG/DL
DEPRECATED RDW RBC AUTO: 37.1 FL (ref 35.1–46.3)
EGFRCR SERPLBLD CKD-EPI 2021: 54 ML/MIN/1.73M2 (ref 60–?)
EOSINOPHIL # BLD AUTO: 0.21 X10(3) UL (ref 0–0.7)
EOSINOPHIL NFR BLD AUTO: 4.8 %
ERYTHROCYTE [DISTWIDTH] IN BLOOD BY AUTOMATED COUNT: 12 % (ref 11–15)
EST. AVERAGE GLUCOSE BLD GHB EST-MCNC: 134 MG/DL (ref 68–126)
FASTING PATIENT LIPID ANSWER: YES
FASTING STATUS PATIENT QL REPORTED: YES
GLOBULIN PLAS-MCNC: 2.6 G/DL (ref 2.8–4.4)
GLUCOSE BLD-MCNC: 135 MG/DL (ref 70–99)
HBA1C MFR BLD: 6.3 % (ref ?–5.7)
HCT VFR BLD AUTO: 38.2 %
HDLC SERPL-MCNC: 45 MG/DL (ref 40–59)
HGB BLD-MCNC: 13.9 G/DL
IMM GRANULOCYTES # BLD AUTO: 0.01 X10(3) UL (ref 0–1)
IMM GRANULOCYTES NFR BLD: 0.2 %
LDLC SERPL CALC-MCNC: 52 MG/DL (ref ?–100)
LYMPHOCYTES # BLD AUTO: 1.43 X10(3) UL (ref 1–4)
LYMPHOCYTES NFR BLD AUTO: 32.4 %
MCH RBC QN AUTO: 31.1 PG (ref 26–34)
MCHC RBC AUTO-ENTMCNC: 36.4 G/DL (ref 31–37)
MCV RBC AUTO: 85.5 FL
MONOCYTES # BLD AUTO: 0.34 X10(3) UL (ref 0.1–1)
MONOCYTES NFR BLD AUTO: 7.7 %
NEUTROPHILS # BLD AUTO: 2.39 X10 (3) UL (ref 1.5–7.7)
NEUTROPHILS # BLD AUTO: 2.39 X10(3) UL (ref 1.5–7.7)
NEUTROPHILS NFR BLD AUTO: 54.2 %
NONHDLC SERPL-MCNC: 75 MG/DL (ref ?–130)
OSMOLALITY SERPL CALC.SUM OF ELEC: 295 MOSM/KG (ref 275–295)
PLATELET # BLD AUTO: 138 10(3)UL (ref 150–450)
POTASSIUM SERPL-SCNC: 3.8 MMOL/L (ref 3.5–5.1)
PROT SERPL-MCNC: 6.9 G/DL (ref 5.7–8.2)
RBC # BLD AUTO: 4.47 X10(6)UL
SODIUM SERPL-SCNC: 142 MMOL/L (ref 136–145)
TRIGL SERPL-MCNC: 129 MG/DL (ref 30–149)
TSI SER-ACNC: 3.73 MIU/ML (ref 0.55–4.78)
VIT B12 SERPL-MCNC: 539 PG/ML (ref 211–911)
VIT D+METAB SERPL-MCNC: 58.8 NG/ML (ref 30–100)
VLDLC SERPL CALC-MCNC: 19 MG/DL (ref 0–30)
WBC # BLD AUTO: 4.4 X10(3) UL (ref 4–11)

## 2023-12-16 PROCEDURE — 85025 COMPLETE CBC W/AUTO DIFF WBC: CPT | Performed by: FAMILY MEDICINE

## 2023-12-16 PROCEDURE — 80061 LIPID PANEL: CPT | Performed by: FAMILY MEDICINE

## 2023-12-16 PROCEDURE — 84443 ASSAY THYROID STIM HORMONE: CPT | Performed by: FAMILY MEDICINE

## 2023-12-16 PROCEDURE — 80053 COMPREHEN METABOLIC PANEL: CPT | Performed by: FAMILY MEDICINE

## 2023-12-16 PROCEDURE — 82306 VITAMIN D 25 HYDROXY: CPT

## 2023-12-16 PROCEDURE — 36415 COLL VENOUS BLD VENIPUNCTURE: CPT

## 2023-12-16 PROCEDURE — 83036 HEMOGLOBIN GLYCOSYLATED A1C: CPT | Performed by: FAMILY MEDICINE

## 2023-12-16 PROCEDURE — 82607 VITAMIN B-12: CPT | Performed by: FAMILY MEDICINE

## 2024-01-08 ENCOUNTER — HOSPITAL ENCOUNTER (OUTPATIENT)
Dept: BONE DENSITY | Facility: HOSPITAL | Age: 84
Discharge: HOME | End: 2024-01-08
Attending: FAMILY MEDICINE
Payer: MEDICARE

## 2024-01-08 DIAGNOSIS — M81.0 OSTEOPOROSIS, UNSPECIFIED OSTEOPOROSIS TYPE, UNSPECIFIED PATHOLOGICAL FRACTURE PRESENCE: ICD-10-CM

## 2024-01-08 PROCEDURE — 77080 DXA BONE DENSITY AXIAL: CPT | Performed by: FAMILY MEDICINE

## 2024-01-11 DIAGNOSIS — M81.0 AGE RELATED OSTEOPOROSIS, UNSPECIFIED PATHOLOGICAL FRACTURE PRESENCE: Primary | ICD-10-CM

## 2024-01-17 NOTE — PROGRESS NOTES
History     Chief Complaint   Patient presents with    Hypertension    Shoulder Pain     HPI  Loulou Lucero is a 79 year old female who presents to the ED for evaluation of HTN and shoulder pain. Patient brought over from  for left shoulder pain, SOB and hypertension. Patient is suppose to have a valve replacement in the future. Patient states she always is SOB due to the needing a valve replacement and patient also states she was here on Monday for shoulder pain as well. Patient thought she had shingles and started taking medication for this when the shoulder pain began on Sunday.     Allergies:  Allergies   Allergen Reactions    Metronidazole Nausea and Vomiting    Pneumococcal Vaccine      Other reaction(s): Edema  Arm swelling    Tramadol Visual Disturbance     Hallucinations          Problem List:    Patient Active Problem List    Diagnosis Date Noted    Anemia, unspecified type 03/28/2023     Priority: Medium    Age-related osteoporosis without current pathological fracture 06/26/2018     Priority: Medium    Diverticular disease of colon 01/23/2018     Priority: Medium    Tobacco abuse 01/23/2018     Priority: Medium     Overview:   Trying to quit      Systolic murmur 06/06/2017     Priority: Medium     Overview:   Aortic sclerosis with no significant aortic stenosis per echo June 2017      GERD (gastroesophageal reflux disease) 06/18/2014     Priority: Medium    Advanced care planning/counseling discussion 04/07/2014     Priority: Medium     Overview:   Declined       Chronic obstructive pulmonary disease (H) 12/29/2010     Priority: Medium        Past Medical History:    Past Medical History:   Diagnosis Date    Asymptomatic varicose veins of lower extremity     Benign paroxysmal vertigo     Chronic obstructive pulmonary disease (H)     Diarrhea     Disorder of cartilage     Diverticulosis of large intestine without perforation or abscess without bleeding     Lower abdominal pain     Other disorders of  [id number] [facility] Progress Note    [facility] Patient Status:  Inpatient    5/10/1940 MRN [id number]   [facility] 3NE-A Attending [facility] Day # 1 PCP Rebecca Patel MD         Subjective:  Farzana Gorman detected. Scattered paranasal sinus disease.     CT Brain  No acute intracranial process. There are mild microvascular white matter ischemic changes, likely relate to long-standing HTN and/or DM.   Atherosclerosis in the anterior and posterior circulation lung (CODE)     Personal history of other medical treatment (CODE)     Personal history of other medical treatment (CODE)     Zoster without complications        Past Surgical History:    Past Surgical History:   Procedure Laterality Date    COLONOSCOPY  03/22/2010    Normal; + family hx, next due 2015    COLONOSCOPY  10/01/2015    W/ POLYPECTOMY recommend follow up in 2020.    COLONOSCOPY N/A 11/7/2019    4 tubular adenoma, 3 year follow up    ESOPHAGOSCOPY, GASTROSCOPY, DUODENOSCOPY (EGD), COMBINED  04/23/2014    Arce's esophagus fu egd 2 yrs    ESOPHAGOSCOPY, GASTROSCOPY, DUODENOSCOPY (EGD), COMBINED N/A 11/7/2019    Procedure: ESOPHAGOGASTRODUODENOSCOPY, WITH BIOPSY;  Surgeon: Santosh Barrera MD;  Location: [facility] OR    LAPAROSCOPIC TUBAL LIGATION  1978         TONSILLECTOMY & ADENOIDECTOMY  1951            Family History:    Family History   Problem Relation Age of Onset    Colon Cancer Father         Cancer-colon    Other - See Comments Mother         Alzheimer's    Other - See Comments Maternal Grandmother         Alzheimer's    Other - See Comments Brother         Alzheimer's    Other - See Comments Brother         aneurysm and stents    Cancer Brother         Cancer,lung    Cancer Brother         Cancer,skin    Other - See Comments Brother         cirrhosis of the liver    Other - See Comments Maternal Uncle         3, Alzheimer's    Breast Cancer No family hx of         Cancer-breast       Social History:  Marital Status:   [5]  Social History     Tobacco Use    Smoking status: Every Day     Packs/day: 0.50     Years: 63.00     Additional pack years: 0.00     Total pack years: 31.50     Types: Cigarettes     Start date: 1961     Passive exposure: Past    Smokeless tobacco: Never    Tobacco comments:     Passive exposure in adult home.    Vaping Use    Vaping Use: Never used   Substance Use Topics    Alcohol use: Not Currently     Comment: rarely at a wedding or special occasion    Drug use: No     "    Medications:    aspirin 81 MG EC tablet  cyanocobalamin (VITAMIN B-12) 1000 MCG tablet  ferrous sulfate (FEROSUL) 325 (65 Fe) MG tablet  fluticasone-salmeterol (ADVAIR DISKUS) 250-50 MCG/ACT inhaler  simvastatin (ZOCOR) 20 MG tablet          Review of Systems   Constitutional:  Negative for chills and fever.   HENT:  Negative for congestion.    Eyes:  Negative for visual disturbance.   Respiratory:  Negative for cough and shortness of breath.    Cardiovascular:  Negative for chest pain.   Gastrointestinal:  Negative for abdominal pain.   Genitourinary:  Negative for hematuria.   Musculoskeletal:         Left shoulder pain   Allergic/Immunologic: Negative for immunocompromised state.   Neurological:  Negative for syncope.       Physical Exam   BP: (!) 182/74  Pulse: 88  Temp: 97.6  F (36.4  C)  Resp: 18  Height: 149.9 cm (4' 11\")  Weight: 36.3 kg (80 lb)  SpO2: 97 %      Physical Exam  Constitutional:       General: She is not in acute distress.     Appearance: She is well-developed. She is not diaphoretic.   HENT:      Head: Normocephalic and atraumatic.   Eyes:      General: No scleral icterus.     Conjunctiva/sclera: Conjunctivae normal.   Cardiovascular:      Rate and Rhythm: Normal rate and regular rhythm.   Pulmonary:      Effort: Pulmonary effort is normal.      Breath sounds: Normal breath sounds.   Abdominal:      Palpations: Abdomen is soft.      Tenderness: There is no abdominal tenderness.   Musculoskeletal:         General: Tenderness present. No deformity.      Cervical back: Neck supple.      Comments: TTP left shoulder/rhomboid area   Lymphadenopathy:      Cervical: No cervical adenopathy.   Skin:     General: Skin is warm and dry.      Coloration: Skin is not jaundiced.      Findings: No rash.   Neurological:      General: No focal deficit present.      Mental Status: She is alert and oriented to person, place, and time. Mental status is at baseline.   Psychiatric:         Mood and Affect: Mood " Essential hypertension    Pure hypercholesterolemia    Acute left hemisphere multiple ischemic infarcts, in MCA but some within watershed territory, no stenosis from CTA head and neck though    Recommendations:  Permissive HTN  ASA 325mg  Lipitor 80 mg  St normal.         Behavior: Behavior normal.         Thought Content: Thought content normal.         Judgment: Judgment normal.         ED Course                 Procedures         EKG read at 1335. HR 77, NSR, no ST changes.     Critical Care time:  none               Results for orders placed or performed during the hospital encounter of 01/17/24 (from the past 24 hour(s))   [place] Draw    Narrative    The following orders were created for panel order [place] Draw.  Procedure                               Abnormality         Status                     ---------                               -----------         ------                     Extra Blue Top Tube[[id number]]                              Final result               Extra Red Top Tube[[id number]]                               Final result               Extra Green Top (Lithium...[[id number]]                      Final result               Extra Purple Top Tube[[id number]]                            Final result               Extra Green Top (Lithium...[[id number]]                      Final result                 Please view results for these tests on the individual orders.   Extra Blue Top Tube   Result Value Ref Range    Hold Specimen JIC    Extra Red Top Tube   Result Value Ref Range    Hold Specimen JIC    Extra Green Top (Lithium Heparin) Tube   Result Value Ref Range    Hold Specimen JIC    Extra Purple Top Tube   Result Value Ref Range    Hold Specimen JIC    Extra Green Top (Lithium Heparin) ON ICE   Result Value Ref Range    Hold Specimen JIC    CBC with platelets differential    Narrative    The following orders were created for panel order CBC with platelets differential.  Procedure                               Abnormality         Status                     ---------                               -----------         ------                     CBC with platelets and d...[[id number]]  Abnormal            Final result                 Please view results  for these tests on the individual orders.   Basic metabolic panel   Result Value Ref Range    Sodium 140 135 - 145 mmol/L    Potassium 3.8 3.4 - 5.3 mmol/L    Chloride 101 98 - 107 mmol/L    Carbon Dioxide (CO2) 29 22 - 29 mmol/L    Anion Gap 10 7 - 15 mmol/L    Urea Nitrogen 16.8 8.0 - 23.0 mg/dL    Creatinine 0.85 0.51 - 0.95 mg/dL    GFR Estimate 70 >60 mL/min/1.73m2    Calcium 9.7 8.8 - 10.2 mg/dL    Glucose 89 70 - 99 mg/dL   Hepatic function panel   Result Value Ref Range    Protein Total 8.4 (H) 6.4 - 8.3 g/dL    Albumin 4.5 3.5 - 5.2 g/dL    Bilirubin Total 0.5 <=1.2 mg/dL    Alkaline Phosphatase 75 40 - 150 U/L    AST 24 0 - 45 U/L    ALT 11 0 - 50 U/L    Bilirubin Direct <0.20 0.00 - 0.30 mg/dL   Troponin T, High Sensitivity   Result Value Ref Range    Troponin T, High Sensitivity 22 (H) <=14 ng/L   CBC with platelets and differential   Result Value Ref Range    WBC Count 6.2 4.0 - 11.0 10e3/uL    RBC Count 3.66 (L) 3.80 - 5.20 10e6/uL    Hemoglobin 9.9 (L) 11.7 - 15.7 g/dL    Hematocrit 30.9 (L) 35.0 - 47.0 %    MCV 84 78 - 100 fL    MCH 27.0 26.5 - 33.0 pg    MCHC 32.0 31.5 - 36.5 g/dL    RDW 16.1 (H) 10.0 - 15.0 %    Platelet Count 341 150 - 450 10e3/uL    % Neutrophils 70 %    % Lymphocytes 19 %    % Monocytes 7 %    % Eosinophils 3 %    % Basophils 1 %    % Immature Granulocytes 0 %    NRBCs per 100 WBC 0 <1 /100    Absolute Neutrophils 4.4 1.6 - 8.3 10e3/uL    Absolute Lymphocytes 1.2 0.8 - 5.3 10e3/uL    Absolute Monocytes 0.4 0.0 - 1.3 10e3/uL    Absolute Eosinophils 0.2 0.0 - 0.7 10e3/uL    Absolute Basophils 0.0 0.0 - 0.2 10e3/uL    Absolute Immature Granulocytes 0.0 <=0.4 10e3/uL    Absolute NRBCs 0.0 10e3/uL   CT Chest Pulmonary Embolism w Contrast    Narrative    PROCEDURE: CT CHEST PULMONARY EMBOLISM W CONTRAST 1/17/2024 2:50 PM    HISTORY: pain in between shoulder blades, sob. Aorta? lungs? PE? left  scapula?    COMPARISONS: 3/21/2023 and 11/21/2022.    TECHNIQUE: Axial postcontrast enhanced  images were obtained with  coronal and sagittal MIP images.    FINDINGS:There is excellent opacification of pulmonary vessels. There  is no pulmonary embolus. Pulmonary arteries appear somewhat enlarged  and there may be some element of pulmonary arterial hypertension.     No enlarged lymph nodes are seen in the mediastinum, michele or axilla.     There are fairly severe changes of centrilobular emphysema with  chronic appearing scarring, primarily in the upper lobes. Few  scattered nodules are stable compared to the prior exams. No new lung  nodule or consolidation is seen.    Heart is not enlarged. There is no aneurysm of the aorta. No pleural  or pericardial effusion is seen.    Limited images through the upper abdomen show no suspicious  abnormality. There is mild degenerative change in the spine. There is  no other bony abnormality.           Impression    IMPRESSION:   1. No pulmonary embolus.   2. Fairly severe changes of emphysema without acute mass or  consolidation.    LINH DAVE MD         SYSTEM ID:  [id number]   Troponin T, High Sensitivity   Result Value Ref Range    Troponin T, High Sensitivity 20 (H) <=14 ng/L       Medications   iopamidol (ISOVUE-370) solution 51 mL (51 mLs Intravenous $Given 1/17/24 9675)       Assessments & Plan (with Medical Decision Making)   Pt nontoxic in NAD. Heart, lung, bowel sounds normal, abd soft, nontender to palpation, nondistended. VSS, afebrile.     She does have tenderness to palpation of her left rhomboid area.  She actually reports that the area feels improved with pressure and palpation.    Hemoglobin is improved from previous study, white count is normal.  Troponin is slightly elevated but trending down with a repeat troponin, EKG appears nonischemic.    CT PE study:  1. No pulmonary embolus.   2. Fairly severe changes of emphysema without acute mass or  consolidation.    Overall she seems to be doing very well.  I think less likely her presentation today is  consistent with shingles.  I also think is less likely your presentation is representing cardiac etiology given her reassuring findings.  We did discuss the possibility that she is having rhomboid strain/inflammation.  She will continue with conservative treatment and we would expect gradual improvement of symptoms over the coming days.    Strict return precautions are given to the pt, they will return if symptoms are worsening or concerning. The pt understands and agrees with the plan and they are discharged.     Chin Kaplan PA-C    I have reviewed the nursing notes.    I have reviewed the findings, diagnosis, plan and need for follow up with the patient.          Discharge Medication List as of 1/17/2024  4:25 PM          Final diagnoses:   Strain of left rhomboid muscle   Left shoulder pain       1/17/2024   [facility] AND \[facility] [place] [facility]\""       Chin Kaplan PA  01/17/24 [id number]

## 2024-01-25 RX ORDER — ALENDRONATE SODIUM 70 MG/1
TABLET ORAL
Qty: 12 TABLET | Refills: 3 | OUTPATIENT
Start: 2024-01-25

## 2024-02-10 RX ORDER — AMLODIPINE BESYLATE 5 MG/1
5 TABLET ORAL DAILY
Qty: 100 TABLET | Refills: 2 | Status: SHIPPED | OUTPATIENT
Start: 2024-02-10

## 2024-02-10 RX ORDER — LOSARTAN POTASSIUM 50 MG/1
50 TABLET ORAL 2 TIMES DAILY
Qty: 200 TABLET | Refills: 2 | Status: SHIPPED | OUTPATIENT
Start: 2024-02-10

## 2024-02-10 NOTE — TELEPHONE ENCOUNTER
Please review; protocol failed. Or has no protocol.  Dr Patel, is amlodipine still given through the G tube for this pt?    Requested Prescriptions   Pending Prescriptions Disp Refills    AMLODIPINE 5 MG Oral Tab [Pharmacy Med Name: amLODIPine Besylate 5 MG Oral Tablet] 100 tablet 2     Sig: TAKE 1 TABLET PER G-TUBE AT  BEDTIME       Hypertension Medications Protocol Passed - 2/9/2024  9:55 PM        Passed - CMP or BMP in past 12 months        Passed - Last BP reading less than 140/90     BP Readings from Last 1 Encounters:   12/06/23 119/64               Passed - In person appointment or virtual visit in the past 12 mos or appointment in next 3 mos     Recent Outpatient Visits              2 months ago [facility]    10 months ago 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)    [facility]    1 year ago [facility] annual health examination    [facility]    [facility]    1 year ago 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)    [facility] Ezekiel Bach MD    Office Visit    1 year ago [facility] loss    [facility]    [facility]          Future Appointments         Provider Department Appt Notes    In 2 months Armando Bee MD [facility] ep dm ee               Passed - EGFRCR or GFRNAA > 50     GFR Evaluation  EGFRCR: 54 , resulted on 12/16/2023            LOSARTAN 50 MG Oral Tab [Pharmacy Med Name: Losartan Potassium 50 MG Oral Tablet] 200 tablet 2     Sig: TAKE 1 TABLET BY MOUTH TWICE  DAILY       Hypertension Medications Protocol  Passed - 2/9/2024  9:55 PM        Passed - CMP or BMP in past 12 months        Passed - Last BP reading less than 140/90     BP Readings from Last 1 Encounters:   12/06/23 119/64               Passed - In person appointment or virtual visit in the past 12 mos or appointment in next 3 mos     Recent Outpatient Visits              2 months ago [facility] for annual health examination    [facility]    Office Visit    10 months ago 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)    [facility]    [facility]    1 year ago Encounter for annual health examination    [facility]    Office Visit    1 year ago 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)    [facility]    Office Visit    1 year ago Memory loss    [facility]    [facility] Visit          Future Appointments         Provider Department Appt Notes    In 2 months Armando Bee MD [facility] ep dm ee               Passed - EGFRCR or GFRNAA > 50     GFR Evaluation  EGFRCR: 54 , resulted on 12/16/2023                Recent Outpatient Visits              2 months ago Encounter for annual health examination    [facility]    Office Visit    10 months ago 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)    [facility]    [facility]    1 year ago Encounter for annual health examination    [facility]    Office Visit    1 year ago  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)    [facility] Ezekiel Garces MD    Office Visit    1 year ago Memory loss    [facility] Rebecca Patel MD    Office Visit           Future Appointments         Provider Department Appt Notes    In 2 months Armando Bee MD [place] erik andrade ee

## 2024-05-28 ENCOUNTER — OFFICE VISIT (OUTPATIENT)
Dept: OPHTHALMOLOGY | Facility: CLINIC | Age: 84
End: 2024-05-28

## 2024-05-28 DIAGNOSIS — Z96.1 PSEUDOPHAKIA OF BOTH EYES: ICD-10-CM

## 2024-05-28 DIAGNOSIS — H43.391 FLOATER, VITREOUS, RIGHT: ICD-10-CM

## 2024-05-28 DIAGNOSIS — E11.9 TYPE 2 DIABETES MELLITUS WITHOUT RETINOPATHY (HCC): Primary | ICD-10-CM

## 2024-05-28 PROCEDURE — 1160F RVW MEDS BY RX/DR IN RCRD: CPT | Performed by: OPHTHALMOLOGY

## 2024-05-28 PROCEDURE — 92014 COMPRE OPH EXAM EST PT 1/>: CPT | Performed by: OPHTHALMOLOGY

## 2024-05-28 PROCEDURE — 1159F MED LIST DOCD IN RCRD: CPT | Performed by: OPHTHALMOLOGY

## 2024-05-28 NOTE — ASSESSMENT & PLAN NOTE
Diet Controlled DM: no background of retinopathy, no signs of neovascularization noted.  Discussed ocular and systemic benefits of blood sugar control.  Diagnosis and treatment discussed in detail with patient.    Will see patient in 1 year for a diabetic exam

## 2024-05-28 NOTE — PATIENT INSTRUCTIONS
Type 2 diabetes mellitus without retinopathy (HCC)  Diet Controlled DM: no background of retinopathy, no signs of neovascularization noted.  Discussed ocular and systemic benefits of blood sugar control.  Diagnosis and treatment discussed in detail with patient.    Will see patient in 1 year for a diabetic exam    Pseudophakia of both eyes  No treatment.     Floater, vitreous, right   There is no evidence of retinal pathology.  All signs and symptoms of retinal detachment/tears explained in detail.    Patient instructed to call the office if they experience increase in floaters, increase in flashes of light, loss of vision or curtain or veil effect.

## 2024-05-28 NOTE — PROGRESS NOTES
Maria T Ferguson is a 84 year old female.    HPI:     HPI    Pt in today for a diabetic eye exam. Pt's daughter is here to translate for her. Daughter mentioned pt just had an eye exam about 6 months ago [facility] for a diabetic eye exam. Patient was prescribed new glasses @ [facility] (forgot to bring them today). Pt denies any ocular issues.       Pt has been a diabetic for 8 years       Pt's diabetes is controlled by diet control   Pt doesn't check BS   Pt's last blood sugar was 134 on 12/16/23  Last HA1C was 6.3 on 12/16/23  Endocrinologist: none  Last edited by Anupama Lynne OT on 5/28/2024  3:38 PM.        Patient History:  Past Medical History:    Age-related nuclear cataract of both eyes    Anxiety state, unspecified    CVA (cerebral vascular accident) (HCC)    minor    Headache    High blood pressure    Hypercholesteremia    Hypertension    Syncope    2D echo ventriular wall thickening    Type II or unspecified type diabetes mellitus without mention of complication, not stated as uncontrolled    Unspecified essential hypertension       Surgical History: Maria T Ferguson has a past surgical history that includes hysterectomy (1980); Cataract extraction w/  intraocular lens implant (Left, 1/11/16) ([facility]); Cataract extraction w/  intraocular lens implant (Right, 2/1/16) ([facility]); colonoscopy (N/A, 7/20/2017) (Procedure: COLONOSCOPY;  Surgeon: Grayson Ortiz MD;  Location: [facility] ENDOSCOPY); Yag Capsulotomy - OD - Right Eye (Right, 01/17/2018) ([facility]); Yag Capsulotomy - OS - Left Eye (Left, 01/24/2018) ([facility]); and Tube insertion (11/2018).    Family History   Problem Relation Age of Onset    Diabetes Other     Hypertension Other     Lipids Other         hyperlipidemia    Breast Cancer Daughter 55    Glaucoma Neg     Macular degeneration Neg        Social History:   Social History     Socioeconomic History    Marital status:    Tobacco Use    Smoking status: Never    Smokeless tobacco: Never   Vaping  Use    Vaping status: Never Used   Substance and Sexual Activity    Alcohol use: No    Drug use: No   Other Topics Concern    Caffeine Concern Yes     Comment: coffee 1 cup    Exercise No    Pt has a pacemaker No    Pt has a defibrillator No    Reaction to local anesthetic No   Social History Narrative    The patient does not use an assistive device..      The patient does live in a home with stairs.        Medications:  Current Outpatient Medications   Medication Sig Dispense Refill    amLODIPine 5 MG Oral Tab Take 1 tablet (5 mg total) by mouth daily. 100 tablet 2    losartan 50 MG Oral Tab Take 1 tablet (50 mg total) by mouth 2 (two) times daily. 200 tablet 2    atorvastatin 80 MG Oral Tab Take 1 tablet (80 mg total) by mouth nightly. 90 tablet 3    famotidine 20 MG Oral Tab Take 1 tablet (20 mg total) by mouth daily. 90 tablet 3    metoprolol tartrate 50 MG Oral Tab Take 1 tablet (50 mg total) by mouth 2 (two) times daily. 180 tablet 3    PARoxetine 10 MG Oral Tab Take 1 tablet (10 mg total) by mouth daily. 90 tablet 3    magnesium 250 MG Oral Tab Take 1 tablet (250 mg total) by mouth.      PREVIDENT 5000 BOOSTER PLUS 1.1 % Dental Paste       LORATADINE OR Take by mouth.      Nutritional Supplements (GLUCERNA) Oral Liquid Take 1 Dose by mouth daily as needed. 90 Bottle 1    aspirin 325 MG Oral Tab 1 tablet (325 mg total) by Per G Tube route daily. 30 tablet 2    Senna 8.6 MG Oral Tab 1 tablet (8.6 mg total) by Per G Tube route 2 (two) times daily. (Patient taking differently: 1 tablet (8.6 mg total) by Per G Tube route daily.) 60 tablet 3    Multiple Vitamin (MULTI-DAY VITAMINS OR) Take 1 tablet by mouth daily.      Cholecalciferol (VITAMIN D3) 3000 units Oral Tab Take 3,000 Units by mouth daily.           Allergies:  Allergies   Allergen Reactions    Lisinopril Coughing       ROS:     ROS    Positive for: Eyes  Last edited by Dara Ferguson OT on 5/28/2024  3:04 PM.          PHYSICAL EXAM:     Base Eye  Exam       Visual Acuity (Snellen - Linear)         Right Left    Dist sc 20/60 20/60    Dist ph sc 20/50 NI    Near sc 20/50 20/50   Forgot new glasses  Patient does not want a refraction.               Tonometry (Icare, 3:18 PM)         Right Left    Pressure 13 10              Pupils         Pupils    Right PERRL    Left PERRL              Visual Fields         Left Right     Full Full              Extraocular Movement         Right Left     Full, Ortho Full, Ortho              Neuro/Psych       Oriented x3: Yes    Mood/Affect: Normal              Dilation       Both eyes: 1.0% Mydriacyl and 2.5% Emory Synephrine @ 3:20 PM              Dilation #2       Both eyes: 1.0% Mydriacyl and 2.5% Emory Synephrine @ 3:20 PM                  Slit Lamp and Fundus Exam       Slit Lamp Exam         Right Left    Lids/Lashes Dermatochalasis, Meibomian gland dysfunction Dermatochalasis, Meibomian gland dysfunction    Conjunctiva/Sclera Normal Normal    Cornea Clear Clear    Anterior Chamber Deep and quiet Deep and quiet    Iris Normal Normal    Lens PC IOL with YAG  PC IOL with YAG     Vitreous Vitreous floaters Clear              Fundus Exam         Right Left    Disc Good rim, Temporal crescent Good rim    C/D Ratio 0.45 0.45    Macula Normal- no BDR Normal- no BDR    Vessels Normal Normal    Periphery Normal Normal                  Refraction       Wearing Rx       Age: 3m    Type: Forgot new glasses   Patient does not want a refraction.                    ASSESSMENT/PLAN:     Diagnoses and Plan:     Type 2 diabetes mellitus without retinopathy (HCC)  Diet Controlled DM: no background of retinopathy, no signs of neovascularization noted.  Discussed ocular and systemic benefits of blood sugar control.  Diagnosis and treatment discussed in detail with patient.    Will see patient in 1 year for a diabetic exam    Pseudophakia of both eyes  No treatment.     Floater, vitreous, right   There is no evidence of retinal pathology.  All  signs and symptoms of retinal detachment/tears explained in detail.    Patient instructed to call the office if they experience increase in floaters, increase in flashes of light, loss of vision or curtain or veil effect.       No orders of the defined types were placed in this encounter.      Meds This Visit:  Requested Prescriptions      No prescriptions requested or ordered in this encounter        Follow up instructions:  Return in about 1 year (around 5/28/2025) for Diabetic eye exam.    5/28/2024  Scribed by: Armando Bee MD

## 2024-06-15 ENCOUNTER — HOSPITAL ENCOUNTER (EMERGENCY)
Age: 84
Discharge: HOME | End: 2024-06-15
Attending: EMERGENCY MEDICINE

## 2024-06-15 ENCOUNTER — APPOINTMENT (OUTPATIENT)
Dept: GENERAL RADIOLOGY | Age: 84
End: 2024-06-15
Attending: EMERGENCY MEDICINE

## 2024-06-15 VITALS
OXYGEN SATURATION: 97 % | TEMPERATURE: 99.1 F | SYSTOLIC BLOOD PRESSURE: 128 MMHG | RESPIRATION RATE: 18 BRPM | DIASTOLIC BLOOD PRESSURE: 60 MMHG | HEART RATE: 63 BPM

## 2024-06-15 DIAGNOSIS — N39.0 URINARY TRACT INFECTION WITHOUT HEMATURIA, SITE UNSPECIFIED: Primary | ICD-10-CM

## 2024-06-15 DIAGNOSIS — H11.32 SUBCONJUNCTIVAL HEMORRHAGE OF LEFT EYE: ICD-10-CM

## 2024-06-15 DIAGNOSIS — M79.10 MYALGIA: ICD-10-CM

## 2024-06-15 LAB
ALBUMIN SERPL-MCNC: 3.5 G/DL (ref 3.6–5.1)
ALBUMIN/GLOB SERPL: 1.1 {RATIO} (ref 1–2.4)
ALP SERPL-CCNC: 98 UNITS/L (ref 45–117)
ALT SERPL-CCNC: 36 UNITS/L
ANION GAP SERPL CALC-SCNC: 10 MMOL/L (ref 7–19)
APPEARANCE UR: ABNORMAL
AST SERPL-CCNC: 28 UNITS/L
ATRIAL RATE (BPM): 67
BACTERIA #/AREA URNS HPF: ABNORMAL /HPF
BASOPHILS # BLD: 0 K/MCL (ref 0–0.3)
BASOPHILS NFR BLD: 0 %
BILIRUB SERPL-MCNC: 0.7 MG/DL (ref 0.2–1)
BILIRUB UR QL STRIP: NEGATIVE
BUN SERPL-MCNC: 18 MG/DL (ref 6–20)
BUN/CREAT SERPL: 21 (ref 7–25)
CALCIUM SERPL-MCNC: 8.3 MG/DL (ref 8.4–10.2)
CHLORIDE SERPL-SCNC: 109 MMOL/L (ref 97–110)
CO2 SERPL-SCNC: 25 MMOL/L (ref 21–32)
COLOR UR: ABNORMAL
CREAT SERPL-MCNC: 0.84 MG/DL (ref 0.51–0.95)
DEPRECATED RDW RBC: 39.2 FL (ref 39–50)
DIASTOLIC BLOOD PRESSURE: 58
EGFRCR SERPLBLD CKD-EPI 2021: 69 ML/MIN/{1.73_M2}
EOSINOPHIL # BLD: 0.1 K/MCL (ref 0–0.5)
EOSINOPHIL NFR BLD: 2 %
ERYTHROCYTE [DISTWIDTH] IN BLOOD: 12.6 % (ref 11–15)
FASTING DURATION TIME PATIENT: ABNORMAL H
FLUAV RNA RESP QL NAA+PROBE: NOT DETECTED
FLUBV RNA RESP QL NAA+PROBE: NOT DETECTED
GLOBULIN SER-MCNC: 3.1 G/DL (ref 2–4)
GLUCOSE SERPL-MCNC: 179 MG/DL (ref 70–99)
GLUCOSE UR STRIP-MCNC: ABNORMAL MG/DL
HCT VFR BLD CALC: 39.2 % (ref 36–46.5)
HGB BLD-MCNC: 14.3 G/DL (ref 12–15.5)
HGB UR QL STRIP: NEGATIVE
HYALINE CASTS #/AREA URNS LPF: ABNORMAL /LPF
IMM GRANULOCYTES # BLD AUTO: 0 K/MCL (ref 0–0.2)
IMM GRANULOCYTES # BLD: 0 %
KETONES UR STRIP-MCNC: NEGATIVE MG/DL
LEUKOCYTE ESTERASE UR QL STRIP: ABNORMAL
LIPASE SERPL-CCNC: 51 UNITS/L (ref 15–77)
LYMPHOCYTES # BLD: 0.5 K/MCL (ref 1–4)
LYMPHOCYTES NFR BLD: 7 %
MCH RBC QN AUTO: 31.6 PG (ref 26–34)
MCHC RBC AUTO-ENTMCNC: 36.5 G/DL (ref 32–36.5)
MCV RBC AUTO: 86.7 FL (ref 78–100)
MONOCYTES # BLD: 0.7 K/MCL (ref 0.3–0.9)
MONOCYTES NFR BLD: 9 %
MUCOUS THREADS URNS QL MICRO: PRESENT
NEUTROPHILS # BLD: 6.6 K/MCL (ref 1.8–7.7)
NEUTROPHILS NFR BLD: 82 %
NITRITE UR QL STRIP: NEGATIVE
NRBC BLD MANUAL-RTO: 0 /100 WBC
P AXIS (DEGREES): 69
PH UR STRIP: 6.5 [PH] (ref 5–7)
PLATELET # BLD AUTO: 103 K/MCL (ref 140–450)
POTASSIUM SERPL-SCNC: 3.8 MMOL/L (ref 3.4–5.1)
PR-INTERVAL (MSEC): 188
PROT SERPL-MCNC: 6.6 G/DL (ref 6.4–8.2)
PROT UR STRIP-MCNC: NEGATIVE MG/DL
QRS-INTERVAL (MSEC): 74
QT-INTERVAL (MSEC): 398
QTC: 420
R AXIS (DEGREES): 27
RAINBOW EXTRA TUBES HOLD SPECIMEN: NORMAL
RBC # BLD: 4.52 MIL/MCL (ref 4–5.2)
RBC #/AREA URNS HPF: ABNORMAL /HPF
RENAL EPI CELLS #/AREA URNS HPF: ABNORMAL /HPF
REPORT TEXT: NORMAL
RSV AG NPH QL IA.RAPID: NOT DETECTED
SARS-COV-2 RNA RESP QL NAA+PROBE: NOT DETECTED
SERVICE CMNT-IMP: NORMAL
SERVICE CMNT-IMP: NORMAL
SODIUM SERPL-SCNC: 140 MMOL/L (ref 135–145)
SP GR UR STRIP: 1.02 (ref 1–1.03)
SQUAMOUS #/AREA URNS HPF: ABNORMAL /HPF
SYSTOLIC BLOOD PRESSURE: 123
T AXIS (DEGREES): 96
TRANS CELLS #/AREA URNS HPF: ABNORMAL /HPF
TROPONIN I BLD-MCNC: <0.1 NG/ML
UROBILINOGEN UR STRIP-MCNC: 0.2 MG/DL
VENTRICULAR RATE EKG/MIN (BPM): 67
WBC # BLD: 8 K/MCL (ref 4.2–11)
WBC #/AREA URNS HPF: ABNORMAL /HPF

## 2024-06-15 PROCEDURE — 10002807 HB RX 258: Performed by: EMERGENCY MEDICINE

## 2024-06-15 PROCEDURE — 99285 EMERGENCY DEPT VISIT HI MDM: CPT

## 2024-06-15 PROCEDURE — 84484 ASSAY OF TROPONIN QUANT: CPT

## 2024-06-15 PROCEDURE — 10002800 HB RX 250 W HCPCS: Performed by: EMERGENCY MEDICINE

## 2024-06-15 PROCEDURE — 93005 ELECTROCARDIOGRAM TRACING: CPT | Performed by: EMERGENCY MEDICINE

## 2024-06-15 PROCEDURE — 81001 URINALYSIS AUTO W/SCOPE: CPT | Performed by: EMERGENCY MEDICINE

## 2024-06-15 PROCEDURE — 87086 URINE CULTURE/COLONY COUNT: CPT | Performed by: EMERGENCY MEDICINE

## 2024-06-15 PROCEDURE — 80053 COMPREHEN METABOLIC PANEL: CPT | Performed by: EMERGENCY MEDICINE

## 2024-06-15 PROCEDURE — 85025 COMPLETE CBC W/AUTO DIFF WBC: CPT | Performed by: EMERGENCY MEDICINE

## 2024-06-15 PROCEDURE — 0241U COVID/FLU/RSV PANEL: CPT | Performed by: EMERGENCY MEDICINE

## 2024-06-15 PROCEDURE — 83690 ASSAY OF LIPASE: CPT | Performed by: EMERGENCY MEDICINE

## 2024-06-15 PROCEDURE — 96374 THER/PROPH/DIAG INJ IV PUSH: CPT

## 2024-06-15 PROCEDURE — 71045 X-RAY EXAM CHEST 1 VIEW: CPT

## 2024-06-15 PROCEDURE — 96375 TX/PRO/DX INJ NEW DRUG ADDON: CPT

## 2024-06-15 PROCEDURE — 96361 HYDRATE IV INFUSION ADD-ON: CPT

## 2024-06-15 PROCEDURE — 71045 X-RAY EXAM CHEST 1 VIEW: CPT | Performed by: RADIOLOGY

## 2024-06-15 RX ORDER — 0.9 % SODIUM CHLORIDE 0.9 %
2 VIAL (ML) INJECTION EVERY 12 HOURS SCHEDULED
Status: DISCONTINUED | OUTPATIENT
Start: 2024-06-15 | End: 2024-06-15 | Stop reason: HOSPADM

## 2024-06-15 RX ORDER — SULFAMETHOXAZOLE AND TRIMETHOPRIM 800; 160 MG/1; MG/1
1 TABLET ORAL 2 TIMES DAILY
Qty: 10 TABLET | Refills: 0 | Status: SHIPPED | OUTPATIENT
Start: 2024-06-15

## 2024-06-15 RX ORDER — ONDANSETRON 2 MG/ML
4 INJECTION INTRAMUSCULAR; INTRAVENOUS ONCE
Status: COMPLETED | OUTPATIENT
Start: 2024-06-15 | End: 2024-06-15

## 2024-06-15 RX ORDER — KETOROLAC TROMETHAMINE 15 MG/ML
15 INJECTION, SOLUTION INTRAMUSCULAR; INTRAVENOUS ONCE
Status: COMPLETED | OUTPATIENT
Start: 2024-06-15 | End: 2024-06-15

## 2024-06-15 RX ADMIN — KETOROLAC TROMETHAMINE 15 MG: 15 INJECTION, SOLUTION INTRAMUSCULAR; INTRAVENOUS at 08:24

## 2024-06-15 RX ADMIN — ONDANSETRON 4 MG: 2 INJECTION INTRAMUSCULAR; INTRAVENOUS at 08:24

## 2024-06-15 RX ADMIN — SODIUM CHLORIDE 500 ML: 9 INJECTION, SOLUTION INTRAVENOUS at 08:24

## 2024-06-15 ASSESSMENT — PAIN SCALES - GENERAL
PAINLEVEL_OUTOF10: 8
PAINLEVEL_OUTOF10: 6
PAINLEVEL_OUTOF10: 8

## 2024-06-15 ASSESSMENT — ENCOUNTER SYMPTOMS
FEVER: 0
HEADACHES: 1
COUGH: 0
BACK PAIN: 1
CHILLS: 0
VOMITING: 0
DIARRHEA: 0
SHORTNESS OF BREATH: 0
ABDOMINAL PAIN: 1
WEAKNESS: 1
NAUSEA: 0
EYE REDNESS: 1

## 2024-06-15 ASSESSMENT — PAIN DESCRIPTION - PAIN TYPE: TYPE: ACUTE PAIN

## 2024-06-16 LAB — BACTERIA UR CULT: NORMAL

## 2024-06-22 ENCOUNTER — HOSPITAL ENCOUNTER (OUTPATIENT)
Age: 84
Discharge: ANOTHER HOSPITAL | DRG: 690 | End: 2024-06-22

## 2024-06-22 ENCOUNTER — HOSPITAL ENCOUNTER (OUTPATIENT)
Age: 84
Discharge: ANOTHER HOSPITAL | End: 2024-06-22

## 2024-06-22 ENCOUNTER — HOSPITAL ENCOUNTER (INPATIENT)
Facility: HOSPITAL | Age: 84
LOS: 2 days | Discharge: HOME | DRG: 690 | End: 2024-06-24
Attending: EMERGENCY MEDICINE | Admitting: HOSPITALIST

## 2024-06-22 ENCOUNTER — APPOINTMENT (OUTPATIENT)
Dept: CT IMAGING | Facility: HOSPITAL | Age: 84
DRG: 690 | End: 2024-06-22
Attending: EMERGENCY MEDICINE

## 2024-06-22 VITALS
HEART RATE: 60 BPM | DIASTOLIC BLOOD PRESSURE: 47 MMHG | TEMPERATURE: 98 F | OXYGEN SATURATION: 98 % | RESPIRATION RATE: 18 BRPM | SYSTOLIC BLOOD PRESSURE: 138 MMHG

## 2024-06-22 DIAGNOSIS — K21.9 GASTROESOPHAGEAL REFLUX DISEASE, UNSPECIFIED WHETHER ESOPHAGITIS PRESENT: ICD-10-CM

## 2024-06-22 DIAGNOSIS — N30.00 ACUTE CYSTITIS WITHOUT HEMATURIA: Primary | ICD-10-CM

## 2024-06-22 DIAGNOSIS — R10.9 ABDOMINAL PAIN OF UNKNOWN ETIOLOGY: ICD-10-CM

## 2024-06-22 DIAGNOSIS — R10.9 ABDOMINAL PAIN, ACUTE: Primary | ICD-10-CM

## 2024-06-22 PROBLEM — D69.6 THROMBOCYTOPENIA (HCC): Status: ACTIVE | Noted: 2024-06-22

## 2024-06-22 PROBLEM — R73.9 HYPERGLYCEMIA: Status: ACTIVE | Noted: 2024-06-22

## 2024-06-22 PROBLEM — D69.6 THROMBOCYTOPENIA: Status: ACTIVE | Noted: 2024-06-22

## 2024-06-22 LAB
ALBUMIN SERPL-MCNC: 4.2 G/DL (ref 3.2–4.8)
ALP LIVER SERPL-CCNC: 88 U/L
ALT SERPL-CCNC: 30 U/L
ANION GAP SERPL CALC-SCNC: 6 MMOL/L (ref 0–18)
AST SERPL-CCNC: 31 U/L (ref ?–34)
BASOPHILS # BLD AUTO: 0.03 X10(3) UL (ref 0–0.2)
BASOPHILS NFR BLD AUTO: 0.5 %
BILIRUB DIRECT SERPL-MCNC: 0.2 MG/DL (ref ?–0.3)
BILIRUB SERPL-MCNC: 0.7 MG/DL (ref 0.2–1.1)
BILIRUB UR QL: NEGATIVE
BUN BLD-MCNC: 11 MG/DL (ref 9–23)
BUN/CREAT SERPL: 9.4 (ref 10–20)
CALCIUM BLD-MCNC: 9.7 MG/DL (ref 8.7–10.4)
CHLORIDE SERPL-SCNC: 110 MMOL/L (ref 98–112)
CLARITY UR: CLEAR
CO2 SERPL-SCNC: 26 MMOL/L (ref 21–32)
COLOR UR: YELLOW
COLOR UR: YELLOW
CREAT BLD-MCNC: 1.17 MG/DL
DEPRECATED RDW RBC AUTO: 41.6 FL (ref 35.1–46.3)
EGFRCR SERPLBLD CKD-EPI 2021: 46 ML/MIN/1.73M2 (ref 60–?)
EOSINOPHIL # BLD AUTO: 0.13 X10(3) UL (ref 0–0.7)
EOSINOPHIL NFR BLD AUTO: 2.2 %
ERYTHROCYTE [DISTWIDTH] IN BLOOD BY AUTOMATED COUNT: 12.7 % (ref 11–15)
GLUCOSE BLD-MCNC: 106 MG/DL (ref 70–99)
GLUCOSE BLDC GLUCOMTR-MCNC: 156 MG/DL (ref 70–99)
GLUCOSE BLDC GLUCOMTR-MCNC: 164 MG/DL (ref 70–99)
GLUCOSE UR STRIP-MCNC: NEGATIVE MG/DL
GLUCOSE UR-MCNC: 30 MG/DL
HCT VFR BLD AUTO: 37.8 %
HGB BLD-MCNC: 13.1 G/DL
HGB UR QL STRIP.AUTO: NEGATIVE
HGB UR QL STRIP: NEGATIVE
HYALINE CASTS #/AREA URNS AUTO: PRESENT /LPF
IMM GRANULOCYTES # BLD AUTO: 0.01 X10(3) UL (ref 0–1)
IMM GRANULOCYTES NFR BLD: 0.2 %
KETONES UR-MCNC: NEGATIVE MG/DL
LACTATE SERPL-SCNC: 0.8 MMOL/L (ref 0.5–2)
LEUKOCYTE ESTERASE UR QL STRIP.AUTO: 250
LEUKOCYTE ESTERASE UR QL STRIP: NEGATIVE
LIPASE SERPL-CCNC: 47 U/L (ref 13–75)
LYMPHOCYTES # BLD AUTO: 1.55 X10(3) UL (ref 1–4)
LYMPHOCYTES NFR BLD AUTO: 26.4 %
MCH RBC QN AUTO: 31 PG (ref 26–34)
MCHC RBC AUTO-ENTMCNC: 34.7 G/DL (ref 31–37)
MCV RBC AUTO: 89.4 FL
MONOCYTES # BLD AUTO: 0.51 X10(3) UL (ref 0.1–1)
MONOCYTES NFR BLD AUTO: 8.7 %
NEUTROPHILS # BLD AUTO: 3.64 X10 (3) UL (ref 1.5–7.7)
NEUTROPHILS # BLD AUTO: 3.64 X10(3) UL (ref 1.5–7.7)
NEUTROPHILS NFR BLD AUTO: 62 %
NITRITE UR QL STRIP.AUTO: NEGATIVE
NITRITE UR QL STRIP: NEGATIVE
OSMOLALITY SERPL CALC.SUM OF ELEC: 294 MOSM/KG (ref 275–295)
PH UR STRIP: 5.5 [PH]
PH UR: 6 [PH] (ref 5–8)
PLATELET # BLD AUTO: 147 10(3)UL (ref 150–450)
PLATELETS.RETICULATED NFR BLD AUTO: 5.8 % (ref 0–7)
POTASSIUM SERPL-SCNC: 4.2 MMOL/L (ref 3.5–5.1)
PROT SERPL-MCNC: 6.7 G/DL (ref 5.7–8.2)
PROT UR STRIP-MCNC: 30 MG/DL
PROT UR-MCNC: 30 MG/DL
RBC # BLD AUTO: 4.23 X10(6)UL
SODIUM SERPL-SCNC: 142 MMOL/L (ref 136–145)
SP GR UR STRIP: 1.03 (ref 1–1.03)
SP GR UR STRIP: >=1.03
TROPONIN I SERPL HS-MCNC: 9 NG/L
UROBILINOGEN UR STRIP-ACNC: 4 MG/DL
UROBILINOGEN UR STRIP-ACNC: 8
WBC # BLD AUTO: 5.9 X10(3) UL (ref 4–11)

## 2024-06-22 PROCEDURE — 74177 CT ABD & PELVIS W/CONTRAST: CPT | Performed by: EMERGENCY MEDICINE

## 2024-06-22 PROCEDURE — 99223 1ST HOSP IP/OBS HIGH 75: CPT | Performed by: HOSPITALIST

## 2024-06-22 PROCEDURE — 99215 OFFICE O/P EST HI 40 MIN: CPT | Performed by: NURSE PRACTITIONER

## 2024-06-22 PROCEDURE — 81002 URINALYSIS NONAUTO W/O SCOPE: CPT | Performed by: NURSE PRACTITIONER

## 2024-06-22 RX ORDER — AMLODIPINE BESYLATE 5 MG/1
5 TABLET ORAL NIGHTLY
Status: DISCONTINUED | OUTPATIENT
Start: 2024-06-22 | End: 2024-06-24

## 2024-06-22 RX ORDER — HEPARIN SODIUM 5000 [USP'U]/ML
5000 INJECTION, SOLUTION INTRAVENOUS; SUBCUTANEOUS EVERY 12 HOURS SCHEDULED
Status: DISCONTINUED | OUTPATIENT
Start: 2024-06-22 | End: 2024-06-24

## 2024-06-22 RX ORDER — PAROXETINE 10 MG/1
10 TABLET, FILM COATED ORAL DAILY
Status: DISCONTINUED | OUTPATIENT
Start: 2024-06-23 | End: 2024-06-24

## 2024-06-22 RX ORDER — DEXTROSE MONOHYDRATE 25 G/50ML
50 INJECTION, SOLUTION INTRAVENOUS
Status: DISCONTINUED | OUTPATIENT
Start: 2024-06-22 | End: 2024-06-24

## 2024-06-22 RX ORDER — METOCLOPRAMIDE HYDROCHLORIDE 5 MG/ML
5 INJECTION INTRAMUSCULAR; INTRAVENOUS EVERY 8 HOURS PRN
Status: DISCONTINUED | OUTPATIENT
Start: 2024-06-22 | End: 2024-06-24

## 2024-06-22 RX ORDER — ACETAMINOPHEN 500 MG
500 TABLET ORAL EVERY 4 HOURS PRN
Status: DISCONTINUED | OUTPATIENT
Start: 2024-06-22 | End: 2024-06-24

## 2024-06-22 RX ORDER — NICOTINE POLACRILEX 4 MG
15 LOZENGE BUCCAL
Status: DISCONTINUED | OUTPATIENT
Start: 2024-06-22 | End: 2024-06-24

## 2024-06-22 RX ORDER — ASPIRIN 325 MG
325 TABLET ORAL DAILY
Status: DISCONTINUED | OUTPATIENT
Start: 2024-06-23 | End: 2024-06-24

## 2024-06-22 RX ORDER — BISACODYL 10 MG
10 SUPPOSITORY, RECTAL RECTAL
Status: DISCONTINUED | OUTPATIENT
Start: 2024-06-22 | End: 2024-06-24

## 2024-06-22 RX ORDER — NICOTINE POLACRILEX 4 MG
30 LOZENGE BUCCAL
Status: DISCONTINUED | OUTPATIENT
Start: 2024-06-22 | End: 2024-06-24

## 2024-06-22 RX ORDER — MORPHINE SULFATE 2 MG/ML
2 INJECTION, SOLUTION INTRAMUSCULAR; INTRAVENOUS ONCE
Status: COMPLETED | OUTPATIENT
Start: 2024-06-22 | End: 2024-06-22

## 2024-06-22 RX ORDER — SODIUM CHLORIDE 9 MG/ML
INJECTION, SOLUTION INTRAVENOUS CONTINUOUS
Status: DISCONTINUED | OUTPATIENT
Start: 2024-06-22 | End: 2024-06-24

## 2024-06-22 RX ORDER — ATORVASTATIN CALCIUM 80 MG/1
80 TABLET, FILM COATED ORAL NIGHTLY
Status: DISCONTINUED | OUTPATIENT
Start: 2024-06-22 | End: 2024-06-24

## 2024-06-22 RX ORDER — SENNOSIDES 8.6 MG
17.2 TABLET ORAL NIGHTLY PRN
Status: DISCONTINUED | OUTPATIENT
Start: 2024-06-22 | End: 2024-06-24

## 2024-06-22 RX ORDER — MELATONIN
3 NIGHTLY PRN
Status: DISCONTINUED | OUTPATIENT
Start: 2024-06-22 | End: 2024-06-24

## 2024-06-22 RX ORDER — POLYETHYLENE GLYCOL 3350 17 G/17G
17 POWDER, FOR SOLUTION ORAL DAILY PRN
Status: DISCONTINUED | OUTPATIENT
Start: 2024-06-22 | End: 2024-06-24

## 2024-06-22 RX ORDER — ECHINACEA PURPUREA EXTRACT 125 MG
1 TABLET ORAL
Status: DISCONTINUED | OUTPATIENT
Start: 2024-06-22 | End: 2024-06-24

## 2024-06-22 RX ORDER — ONDANSETRON 2 MG/ML
4 INJECTION INTRAMUSCULAR; INTRAVENOUS EVERY 6 HOURS PRN
Status: DISCONTINUED | OUTPATIENT
Start: 2024-06-22 | End: 2024-06-24

## 2024-06-22 RX ORDER — LOSARTAN POTASSIUM 50 MG/1
50 TABLET ORAL DAILY
Status: DISCONTINUED | OUTPATIENT
Start: 2024-06-23 | End: 2024-06-24

## 2024-06-22 NOTE — ED QUICK NOTES
Pt reporting pain improved after morphine. Pt and daughter updated on lab results, aware of plan for CT imaging. Denies other needs at this time.

## 2024-06-22 NOTE — ED INITIAL ASSESSMENT (HPI)
Maria T arrived through triage with her daughter for c/o continued abdominal and back pain for the past 10 days or so. Diagnosed with UTI on 6/15 and started on Bactrim. Daughter states Pt only took 3 doses of antibiotics due to intolerance. She returned to IC today and urine reportedly clean so Pt was referred to the ED for further eval. Also c/o dizziness.

## 2024-06-22 NOTE — ED PROVIDER NOTES
Patient Seen in: [facility]      History     Chief Complaint   Patient presents with    Urinary Symptoms     Entered by patient    Nausea     Stated Complaint: Urinary Symptoms    Subjective:   HPI    This is a an 84-year-old female with a history of hyperlipidemia, diabetes, CVA, hypertension who presents with a chief complaint of abdominal pain and back pain.  Patient was seen at emergency [facility] in [place] on 6/15 after she was having myalgia, abdominal and back pain.  Patient was treated for a urinary tract infection with Bactrim.  Daughter states that after 3 days of taking the Bactrim she had an increase in abdominal pain and back pain in addition to decreased appetite and dizziness.  Medication was stopped.  Denies any dysuria, urgency or frequency.  No nausea or vomiting but has not had much of an appetite.  Reports constant dizziness, denies any shortness of breath or chest pain.      Objective:   Past Medical History:    Age-related nuclear cataract of both eyes    Anxiety state, unspecified    CVA (cerebral vascular accident) (HCC)    minor    Headache    High blood pressure    Hypercholesteremia    Hypertension    Syncope    2D echo ventriular wall thickening    Type II or unspecified type diabetes mellitus without mention of complication, not stated as uncontrolled    Unspecified essential hypertension              Past Surgical History:   Procedure Laterality Date    Cataract extraction w/  intraocular lens implant Left 1/11/16    [facility]    Cataract extraction w/  intraocular lens implant Right 2/1/16    [facility]    Colonoscopy N/A 7/20/2017    Procedure: COLONOSCOPY;  Surgeon: Grayson Ortiz MD;  Location: [facility] ENDOSCOPY    Hysterectomy  1980    Tube insertion  11/2018    Yag capsulotomy - od - right eye Right 01/17/2018    [facility]    Yag capsulotomy - os - left eye Left 01/24/2018    [facility]                Social History     Socioeconomic History    Marital status:    Tobacco Use     Smoking status: Never    Smokeless tobacco: Never   Vaping Use    Vaping status: Never Used   Substance and Sexual Activity    Alcohol use: No    Drug use: No   Other Topics Concern    Caffeine Concern Yes     Comment: coffee 1 cup    Exercise No    Pt has a pacemaker No    Pt has a defibrillator No    Reaction to local anesthetic No   Social History Narrative    The patient does not use an assistive device..      The patient does live in a home with stairs.               Review of Systems   Constitutional:  Positive for appetite change.   Gastrointestinal:  Positive for abdominal pain and nausea.   Neurological:  Positive for dizziness.   All other systems reviewed and are negative.      Positive for stated complaint: Urinary Symptoms  Other systems are as noted in HPI.  Constitutional and vital signs reviewed.      All other systems reviewed and negative except as noted above.    Physical Exam     ED Triage Vitals [06/22/24 1107]   /47   Pulse 60   Resp 18   Temp 98.1 °F (36.7 °C)   Temp src Temporal   SpO2 98 %   O2 Device None (Room air)       Current Vitals:   Vital Signs  BP: 138/47  Pulse: 60  Resp: 18  Temp: 98.1 °F (36.7 °C)  Temp src: Temporal    Oxygen Therapy  SpO2: 98 %  O2 Device: None (Room air)            Physical Exam  Vitals and nursing note reviewed.   Constitutional:       General: She is awake. She is not in acute distress.     Appearance: Normal appearance. She is not ill-appearing, toxic-appearing or diaphoretic.   HENT:      Head: Normocephalic and atraumatic.      Right Ear: Tympanic membrane, ear canal and external ear normal.      Left Ear: Tympanic membrane, ear canal and external ear normal.      Nose: Nose normal.      Mouth/Throat:      Mouth: Mucous membranes are moist.      Pharynx: Oropharynx is clear. Uvula midline.   Eyes:      General: Lids are normal.      Extraocular Movements: Extraocular movements intact.      Conjunctiva/sclera: Conjunctivae normal.      Pupils: Pupils  are equal, round, and reactive to light.   Cardiovascular:      Rate and Rhythm: Normal rate and regular rhythm.      Pulses: Normal pulses.      Heart sounds: Normal heart sounds.   Pulmonary:      Effort: Pulmonary effort is normal.      Breath sounds: Normal breath sounds.   Abdominal:      Tenderness: There is abdominal tenderness in the periumbilical area and left lower quadrant.   Skin:     General: Skin is warm and dry.      Capillary Refill: Capillary refill takes less than 2 seconds.   Neurological:      General: No focal deficit present.      Mental Status: She is alert and oriented to person, place, and time.   Psychiatric:         Mood and Affect: Mood normal.         Behavior: Behavior normal. Behavior is cooperative.         Thought Content: Thought content normal.         Judgment: Judgment normal.       ED Course     Labs Reviewed   [facility] POCT URINALYSIS DIPSTICK - Abnormal; Notable for the following components:       Result Value    Protein urine 30 (*)     Ketone, Urine Trace (*)     Bilirubin, Urine Moderate (*)     Urobilinogen urine 4.0 (*)     All other components within normal limits     Urine reviewed, no leukocyte esterase, no nitrite, moderate bilirubin.  MDM          Medical Decision Making  Differential diagnosis including but not limited to cystitis, pyelonephritis, diverticulitis, diverticulosis, nephrolithiasis.  I discussed with the daughter we do not have imaging available here but can order at [facility].  She states she will just take her mom to the hospital.  Will go to [place] at this time.  Case discussed with Dr. Mendez who agrees with plan of care    Problems Addressed:  Abdominal pain, acute: acute illness or injury        Disposition and Plan     Clinical Impression:  1. Abdominal pain, acute         Disposition:  Ic to ed  6/22/2024 11:38 am    Follow-up:  Rebecca Patel MD  [address] [place] [address] [id number] [phone]                Medications Prescribed:  Discharge  Medication List as of 6/22/2024 11:37 AM

## 2024-06-22 NOTE — H&P
[facility]  part of [facility]    History & Physical    Maria T Ferguson Patient Status:  Inpatient    5/10/1940 MRN [id number]   Location [facility] 5SW/SE Attending Farida Harris MD   Hosp Day # 0 PCP Rebecca Patel MD     Date:  2024  Date of Admission:  2024    History provided by:pt and dtr, also d/w ER MD  Chief Complaint:     Chief Complaint   Patient presents with    Abdomen/Flank Pain    Back Pain       HPI:   Maria T Ferguson is a(n) 84 year old female with h/o diet-controlled diabetes, hypertension, CVA with right residual weakness, started having abdominal pains, flank and back pains associated with nausea and dizziness for few days, was seen at ER in [place] on 6/15, found to have UTI, prescribed Bactrim, that patient was taking, now back home feeling worse.  Also very poor appetite, myalgia, but no high fevers, no vomiting.    Daughter at bedside    History     Past Medical History:    Age-related nuclear cataract of both eyes    Anxiety state, unspecified    CVA (cerebral vascular accident) (HCC)    minor    Headache    High blood pressure    Hypercholesteremia    Hypertension    Syncope    2D echo ventriular wall thickening    Type II or unspecified type diabetes mellitus without mention of complication, not stated as uncontrolled    Unspecified essential hypertension     Past Surgical History:   Procedure Laterality Date    Cataract extraction w/  intraocular lens implant Left 16    [facility]    Cataract extraction w/  intraocular lens implant Right 16    [facility]    Colonoscopy N/A 2017    Procedure: COLONOSCOPY;  Surgeon: Grayson Ortiz MD;  Location: [facility] ENDOSCOPY    Hysterectomy  1980    Tube insertion  2018    Yag capsulotomy - od - right eye Right 2018    [facility]    Yag capsulotomy - os - left eye Left 2018    [facility]     Family History   Problem Relation Age of Onset    Diabetes Other     Hypertension Other     Lipids Other          hyperlipidemia    Breast Cancer Daughter 55    Glaucoma Neg     Macular degeneration Neg      Social History:  Social History     Socioeconomic History    Marital status:    Tobacco Use    Smoking status: Never    Smokeless tobacco: Never   Vaping Use    Vaping status: Never Used   Substance and Sexual Activity    Alcohol use: No    Drug use: No   Other Topics Concern    Caffeine Concern Yes     Comment: coffee 1 cup    Exercise No    Pt has a pacemaker No    Pt has a defibrillator No    Reaction to local anesthetic No   Social History Narrative    The patient does not use an assistive device..      The patient does live in a home with stairs.      Allergies/Medications:   Allergies:   Allergies   Allergen Reactions    Lisinopril Coughing     Medications Prior to Admission   Medication Sig    amLODIPine 5 MG Oral Tab Take 1 tablet (5 mg total) by mouth daily. (Patient taking differently: Take 1 tablet (5 mg total) by mouth at bedtime.)    losartan 50 MG Oral Tab Take 1 tablet (50 mg total) by mouth 2 (two) times daily.    atorvastatin 80 MG Oral Tab Take 1 tablet (80 mg total) by mouth nightly.    famotidine 20 MG Oral Tab Take 1 tablet (20 mg total) by mouth daily. (Patient taking differently: Take 1 tablet (20 mg total) by mouth every other day.)    metoprolol tartrate 50 MG Oral Tab Take 1 tablet (50 mg total) by mouth 2 (two) times daily.    PARoxetine 10 MG Oral Tab Take 1 tablet (10 mg total) by mouth daily.    LORATADINE OR Take by mouth daily as needed.    Nutritional Supplements (GLUCERNA) Oral Liquid Take 1 Dose by mouth daily as needed.    aspirin 325 MG Oral Tab 1 tablet (325 mg total) by Per G Tube route daily. (Patient taking differently: Take 1 tablet (325 mg total) by mouth daily.)    Senna 8.6 MG Oral Tab 1 tablet (8.6 mg total) by Per G Tube route 2 (two) times daily. (Patient taking differently: 1 tablet (8.6 mg total) by Per G Tube route daily as needed.)    Cholecalciferol (VITAMIN D3)  3000 units Oral Tab Take 3,000 Units by mouth daily.      magnesium 250 MG Oral Tab Take 1 tablet (250 mg total) by mouth. (Patient not taking: Reported on 6/22/2024)    PREVIDENT 5000 BOOSTER PLUS 1.1 % Dental Paste  (Patient not taking: Reported on 6/22/2024)    Multiple Vitamin (MULTI-DAY VITAMINS OR) Take 1 tablet by mouth daily. (Patient not taking: Reported on 6/22/2024)       Review of Systems:   Pertinent items are noted in HPI.  Otherwise negative  Comprehensive 12 point ROS performed    Physical Exam:   Vital Signs:  Blood pressure 143/84, pulse 59, temperature 97.6 °F (36.4 °C), temperature source Oral, resp. rate 16, height 4' 9\" (1.448 m), weight 110 lb 3.2 oz (50 kg), SpO2 96%, not currently breastfeeding.     General:  Alert and oriented.  NAD  Diffuse skin problem:  None.  Eye:  Pupils are equal, round and reactive to light, extraocular movements are intact, Normal conjunctiva.  HENT:  Normocephalic, oral mucosa is dry.  Head:  Normocephalic, atraumatic.  Neck:  Supple, non-tender  Respiratory:  Lungs are clear to auscultation, respirations are non-labored, breath sounds are equal, symmetrical chest wall expansion.  Cardiovascular:  Normal rate, regular rhythm, no murmur, no edema.  Gastrointestinal:  Soft, suprapubic tenderness, as well as bilateral flank tenderness, abd non-distended, normal bowel sounds, no organomegaly.  Lymphatics:  No lymphadenopathy neck, axilla, groin.  Musculoskeletal: Normal range of motion.no edema.  Feet:  Normal pulses.  Neurologic:  Alert, oriented, residual right-sided weakness.  Cognition and Speech:  Oriented, speech clear and coherent.  Psychiatric:  Cooperative, appropriate mood & affect.    Cervical Papanicolaou to be done in MD's office    Results:     Lab Results   Component Value Date    WBC 5.9 06/22/2024    HGB 13.1 06/22/2024    HCT 37.8 06/22/2024    .0 (L) 06/22/2024    CREATSERUM 1.17 (H) 06/22/2024    BUN 11 06/22/2024     06/22/2024    K  4.2 06/22/2024     06/22/2024    CO2 26.0 06/22/2024     (H) 06/22/2024    CA 9.7 06/22/2024    ALB 4.2 06/22/2024    ALKPHO 88 06/22/2024    BILT 0.7 06/22/2024    TP 6.7 06/22/2024    AST 31 06/22/2024    ALT 30 06/22/2024    PTT 30.8 03/27/2019    INR 1.02 03/27/2019    T4F 0.8 03/28/2019    TSH 3.735 12/16/2023    LIP 47 06/22/2024    MG 1.9 03/29/2019    PHOS 4.0 12/20/2017    TROP <0.045 03/27/2019    B12 539 12/16/2023       CT ABDOMEN+PELVIS(CONTRAST ONLY)(CPT=74177)    Result Date: 6/22/2024  CONCLUSION:  1. No acute intra-abdominal process. 2. Lesser incidental findings as above.    Dictated by (CST): Ag Limon MD on 6/22/2024 at 2:38 PM     Finalized by (CST): Ag Limon MD on 6/22/2024 at 2:45 PM         EKG 12 Lead    Result Date: 6/22/2024  Sinus bradycardia with sinus arrhythmia Nonspecific T wave abnormality Abnormal ECG When compared with ECG of 25-APR-2023 00:43, OR interval has decreased     Assessment/Plan:       Acute cystitis without hematuria, possible pyelonephritis with bilateral flank pains  Abdominal pain probably from UTI  S/p Bactrim x 3 days with worsening symptoms  -Lactic acid 0.8 (WNL)  -Blood culture pending  -LFT and lipase normal  -UA abn, cx pending  -Try to obtain results of urine culture from outside hospital  -pt on famotidine prn at home, use PPI iv in hospital for now    Headache, dizziness, slightly abnormal renal functions  - suspicious for dehydration  -Treatment as above, and monitor closely  -Troponin normal      Thrombocytopenia (HCC)  -mild, initial platelets 147--> repeat in the morning    DM2, diet controlled--> Accu-Cheks and sliding scale  H/o CVA with residual right-sided weakness--> PT/OT  HTN--> Monitor  HL  Allergy to lisinopril    Full code    Social: From home with family    DVT prophylaxis: Heparin subcu    Plan: Admit inpatient              Chart reviewed, including current admission vitals, notes, labs and imaging  Pertinent  past medical records reviewed  Labs ordered and medications adjusted as outlined above  Coordinate care with care team/consultants  Discussed with patient and family at bedside available results of tests, management plan as outlined above, and the need for hospitalization  D/w RN     MDM high  severe acute illness/or exacerbation of chronic illness posing a threat to life, IV medication, requiring close monitoring in hospital.    MAYTE WATERMAN MD  6/22/2024    **Certification      PHYSICIAN Certification of Need for Inpatient Hospitalization - Initial Certification    Patient will require inpatient services that will reasonably be expected to span two midnight's based on the clinical documentation in H+P.   Based on patients current state of illness, I anticipate that, after discharge, patient will require TBD.

## 2024-06-22 NOTE — ED INITIAL ASSESSMENT (HPI)
Tested positive for UTI Saturday, started on Bactrim. Started having severe stomach pain and back aches immediately after starting antibiotic. Continues to have urinary symptoms as well. Last dose of Bactrim on Tuesday.

## 2024-06-22 NOTE — ED QUICK NOTES
Orders for admission, patient is aware of plan and ready to go upstairs. Any questions, please call ED RN Radha at extension [phone].     Patient Covid vaccination status: Fully vaccinated     COVID Test Ordered in ED: None    COVID Suspicion at Admission: N/A    Running Infusions:      Mental Status/LOC at time of transport: A&Ox4, Czech speaking.     Other pertinent information:   CIWA score: N/A   NIH score:  N/A

## 2024-06-22 NOTE — ED PROVIDER NOTES
Patient Seen in: [facility] Emergency Department      History     Chief Complaint   Patient presents with    Abdomen/Flank Pain    Back Pain     Stated Complaint: Abd pain, back pain    Subjective:   HPI    Patient presents emergency department complaining of abdominal and back pain.  The daughter acts as a  as they declined language line.  For the last 3 days she has had back and abdominal discomfort which seems to be diffuse in her low back.  She was seen at an emergency department in [facility] and diagnosed with a urinary tract infection 3 days ago but was unable to tolerate the Bactrim so was discontinued by the patient and her family.  There is no fever or vomiting.  There is no other aggravating or alleviating factors.    Objective:   No pertinent past medical history.            No pertinent past surgical history.              No pertinent social history.            Review of Systems    Positive for stated complaint: Abd pain, back pain  Other systems are as noted in HPI.  Constitutional and vital signs reviewed.      All other systems reviewed and negative except as noted above.    Physical Exam     ED Triage Vitals [06/22/24 1210]   /45   Pulse (S) 56   Resp 22   Temp 97.9 °F (36.6 °C)   Temp src Temporal   SpO2 96 %   O2 Device None (Room air)       Current Vitals:   Vital Signs  BP: 122/60  Pulse: 56  Resp: 18  Temp: 98.4 °F (36.9 °C)  Temp src: Oral  MAP (mmHg): 78    Oxygen Therapy  SpO2: 94 %  O2 Device: None (Room air)            Physical Exam  Vitals and nursing note reviewed.   Constitutional:       General: She is not in acute distress.     Appearance: She is well-developed.   HENT:      Head: Normocephalic.      Nose: Nose normal.      Mouth/Throat:      Mouth: Mucous membranes are moist.   Eyes:      Conjunctiva/sclera: Conjunctivae normal.   Cardiovascular:      Rate and Rhythm: Normal rate and regular rhythm.      Heart sounds: No murmur heard.  Pulmonary:       Effort: Pulmonary effort is normal. No respiratory distress.      Breath sounds: Normal breath sounds.   Abdominal:      General: There is no distension.      Palpations: Abdomen is soft.      Tenderness: There is generalized abdominal tenderness. There is no guarding or rebound.   Musculoskeletal:         General: No tenderness. Normal range of motion.      Cervical back: Normal range of motion and neck supple.   Skin:     General: Skin is warm and dry.      Findings: No rash.   Neurological:      Mental Status: She is alert and oriented to person, place, and time.      Comments: Right-sided hemiplegia is present.               ED Course     Labs Reviewed   BASIC METABOLIC PANEL (8) - Abnormal; Notable for the following components:       Result Value    Glucose 106 (*)     Creatinine 1.17 (*)     BUN/CREA Ratio 9.4 (*)     eGFR-Cr 46 (*)     All other components within normal limits   URINALYSIS WITH CULTURE REFLEX - Abnormal; Notable for the following components:    Clarity Urine Turbid (*)     Glucose Urine 30 (*)     Protein Urine 30 (*)     Urobilinogen Urine 8 (*)     Leukocyte Esterase Urine 250 (*)     WBC Urine 11-20 (*)     Squamous Epi. Cells Few (*)     Ca Oxalate Crystals Many (*)     Hyaline Casts Present (*)     All other components within normal limits   RENAL FUNCTION PANEL - Abnormal; Notable for the following components:    Glucose 116 (*)     BUN 8 (*)     BUN/CREA Ratio 9.0 (*)     All other components within normal limits   POCT GLUCOSE - Abnormal; Notable for the following components:    POC Glucose  156 (*)     All other components within normal limits   POCT GLUCOSE - Abnormal; Notable for the following components:    POC Glucose  164 (*)     All other components within normal limits   POCT GLUCOSE - Abnormal; Notable for the following components:    POC Glucose  129 (*)     All other components within normal limits   POCT GLUCOSE - Abnormal; Notable for the following components:    POC  Glucose  256 (*)     All other components within normal limits   POCT GLUCOSE - Abnormal; Notable for the following components:    POC Glucose  154 (*)     All other components within normal limits   POCT GLUCOSE - Abnormal; Notable for the following components:    POC Glucose  110 (*)     All other components within normal limits   CBC W/ DIFFERENTIAL - Abnormal; Notable for the following components:    .0 (*)     All other components within normal limits   CBC W/ DIFFERENTIAL - Abnormal; Notable for the following components:    .0 (*)     All other components within normal limits   TROPONIN I HIGH SENSITIVITY - Normal   HEPATIC FUNCTION PANEL (7) - Normal   LIPASE - Normal   LACTIC ACID, PLASMA - Normal   MAGNESIUM - Normal   CBC WITH DIFFERENTIAL WITH PLATELET    Narrative:     The following orders were created for panel order CBC With Differential With Platelet.  Procedure                               Abnormality         Status                     ---------                               -----------         ------                     CBC W/ DIFFERENTIAL[[id number]]          Abnormal            Final result                 Please view results for these tests on the individual orders.   CBC WITH DIFFERENTIAL WITH PLATELET    Narrative:     The following orders were created for panel order CBC With Differential With Platelet.  Procedure                               Abnormality         Status                     ---------                               -----------         ------                     CBC W/ DIFFERENTIAL[[id number]]          Abnormal            Final result                 Please view results for these tests on the individual orders.   RAINBOW DRAW LAVENDER   RAINBOW DRAW LIGHT GREEN   RAINBOW DRAW BLUE   RAINBOW DRAW GOLD   URINE CULTURE, ROUTINE   BLOOD CULTURE   BLOOD CULTURE     EKG    Rate, intervals and axes as noted on EKG Report.  Rate: 51 bpm  Rhythm: Sinus Rhythm  Reading: NS  changes, abnormal but no acute injury pattern                          MDM        Admission disposition: 6/22/2024  2:47 PM                           Medical Decision Making  Differential diagnosis considered for partially treated urinary tract infection, enteritis, constipation, diverticulitis.    Problems Addressed:  Abdominal pain of unknown etiology: acute illness or injury  Acute cystitis without hematuria: acute illness or injury    Amount and/or Complexity of Data Reviewed  Labs: ordered. Decision-making details documented in ED Course.     Details: Urinary tract infection on urinalysis.  CBC and chemistry are all normal  Radiology: ordered and independent interpretation performed. Decision-making details documented in ED Course.     Details: CT abdomen shows no acute findings  ECG/medicine tests: ordered and independent interpretation performed. Decision-making details documented in ED Course.  Discussion of management or test interpretation with external provider(s): Patient with significant nausea and persistent symptoms of UTI unable to tolerate oral antibiotics at home.  Will start on Rocephin.    Risk  Prescription drug management.  Decision regarding hospitalization.        Disposition and Plan     Clinical Impression:  1. Acute cystitis without hematuria    2. Abdominal pain of unknown etiology         Disposition:  Admit  6/22/2024  2:47 pm    Follow-up:  No follow-up provider specified.  We recommend that you schedule follow up care with a primary care provider within the next three months to obtain basic health screening including reassessment of your blood pressure.      Medications Prescribed:  Current Discharge Medication List                            Hospital Problems       Present on Admission  Date Reviewed: 5/28/2024            ICD-10-CM Noted POA    * (Principal) Acute cystitis without hematuria N30.00 6/22/2024 Unknown    Abdominal pain of unknown etiology R10.9 6/22/2024 Unknown     Hyperglycemia R73.9 6/22/2024 Yes    Thrombocytopenia (HCC) D69.6 6/22/2024 Yes

## 2024-06-23 LAB
ALBUMIN SERPL-MCNC: 4.1 G/DL (ref 3.2–4.8)
ANION GAP SERPL CALC-SCNC: 7 MMOL/L (ref 0–18)
ATRIAL RATE: 51 BPM
BASOPHILS # BLD AUTO: 0.03 X10(3) UL (ref 0–0.2)
BASOPHILS NFR BLD AUTO: 0.6 %
BUN BLD-MCNC: 8 MG/DL (ref 9–23)
BUN/CREAT SERPL: 9 (ref 10–20)
CALCIUM BLD-MCNC: 8.7 MG/DL (ref 8.7–10.4)
CHLORIDE SERPL-SCNC: 110 MMOL/L (ref 98–112)
CO2 SERPL-SCNC: 26 MMOL/L (ref 21–32)
CREAT BLD-MCNC: 0.89 MG/DL
DEPRECATED RDW RBC AUTO: 41 FL (ref 35.1–46.3)
EGFRCR SERPLBLD CKD-EPI 2021: 64 ML/MIN/1.73M2 (ref 60–?)
EOSINOPHIL # BLD AUTO: 0.17 X10(3) UL (ref 0–0.7)
EOSINOPHIL NFR BLD AUTO: 3.4 %
ERYTHROCYTE [DISTWIDTH] IN BLOOD BY AUTOMATED COUNT: 12.6 % (ref 11–15)
GLUCOSE BLD-MCNC: 116 MG/DL (ref 70–99)
GLUCOSE BLDC GLUCOMTR-MCNC: 110 MG/DL (ref 70–99)
GLUCOSE BLDC GLUCOMTR-MCNC: 129 MG/DL (ref 70–99)
GLUCOSE BLDC GLUCOMTR-MCNC: 154 MG/DL (ref 70–99)
GLUCOSE BLDC GLUCOMTR-MCNC: 256 MG/DL (ref 70–99)
HCT VFR BLD AUTO: 39.2 %
HGB BLD-MCNC: 13.6 G/DL
IMM GRANULOCYTES # BLD AUTO: 0.01 X10(3) UL (ref 0–1)
IMM GRANULOCYTES NFR BLD: 0.2 %
LYMPHOCYTES # BLD AUTO: 1.99 X10(3) UL (ref 1–4)
LYMPHOCYTES NFR BLD AUTO: 39.6 %
MAGNESIUM SERPL-MCNC: 1.8 MG/DL (ref 1.6–2.6)
MCH RBC QN AUTO: 31.1 PG (ref 26–34)
MCHC RBC AUTO-ENTMCNC: 34.7 G/DL (ref 31–37)
MCV RBC AUTO: 89.7 FL
MONOCYTES # BLD AUTO: 0.4 X10(3) UL (ref 0.1–1)
MONOCYTES NFR BLD AUTO: 8 %
NEUTROPHILS # BLD AUTO: 2.43 X10 (3) UL (ref 1.5–7.7)
NEUTROPHILS # BLD AUTO: 2.43 X10(3) UL (ref 1.5–7.7)
NEUTROPHILS NFR BLD AUTO: 48.2 %
OSMOLALITY SERPL CALC.SUM OF ELEC: 295 MOSM/KG (ref 275–295)
P AXIS: 23 DEGREES
P-R INTERVAL: 162 MS
PHOSPHATE SERPL-MCNC: 3.5 MG/DL (ref 2.4–5.1)
PLATELET # BLD AUTO: 139 10(3)UL (ref 150–450)
PLATELETS.RETICULATED NFR BLD AUTO: 5.2 % (ref 0–7)
POTASSIUM SERPL-SCNC: 3.7 MMOL/L (ref 3.5–5.1)
Q-T INTERVAL: 424 MS
QRS DURATION: 84 MS
QTC CALCULATION (BEZET): 390 MS
R AXIS: 7 DEGREES
RBC # BLD AUTO: 4.37 X10(6)UL
SODIUM SERPL-SCNC: 143 MMOL/L (ref 136–145)
T AXIS: 98 DEGREES
VENTRICULAR RATE: 51 BPM
WBC # BLD AUTO: 5 X10(3) UL (ref 4–11)

## 2024-06-23 PROCEDURE — 99233 SBSQ HOSP IP/OBS HIGH 50: CPT | Performed by: HOSPITALIST

## 2024-06-23 RX ORDER — MAGNESIUM OXIDE 400 MG/1
400 TABLET ORAL DAILY
Status: DISCONTINUED | OUTPATIENT
Start: 2024-06-23 | End: 2024-06-24

## 2024-06-23 RX ORDER — POTASSIUM CHLORIDE 20 MEQ/1
20 TABLET, EXTENDED RELEASE ORAL ONCE
Status: COMPLETED | OUTPATIENT
Start: 2024-06-23 | End: 2024-06-23

## 2024-06-23 NOTE — PROGRESS NOTES
[facility]  part of [facility]    Progress Note    Maria T Ferguson Patient Status:  Inpatient    5/10/1940 MRN [id number]   Location [facility] 5SW/SE Attending Farida Harris MD   Hosp Day # 1 PCP Rebecca Patel MD     Chief complaint: abd pain  Subjective:     Much better today, abdominal pain resolved, eating breakfast now  No high fevers  No vomiting  No  dizziness    Objective:   Blood pressure 134/63, pulse 60, temperature 97.9 °F (36.6 °C), temperature source Oral, resp. rate 18, height 4' 9\" (1.448 m), weight 110 lb 3.2 oz (50 kg), SpO2 95%, not currently breastfeeding.    GEN: awake, comfortable, NAD  HEENT: conjunctivae/corneas clear. PERRL, EOM's intact.  Neck: no adenopathy, no carotid bruit, no JVD, supple  Pulmonary:  clear to auscultation bilaterally  Cardiovascular: S1, S2 normal, no murmur, click, rub or gallop, regular rate and rhythm  Abdominal: soft, non-tender; bowel sounds normal; no masses,  no organomegaly  Extremities: no cyanosis or edema  Pulses: palpable and symmetric  Skin: No rashes or lesions, warm and dry  Neurologic: Alert and oriented X 3, chronic residual right sided weakness  Psychiatric: calm, cooperative    Assessment and Plan:     Acute cystitis without hematuria, possible pyelonephritis with bilateral flank pains  Abdominal pain probably from UTI, now resolved  S/p Bactrim x 3 days with worsening symptoms  -Lactic acid 0.8 (WNL)  -Blood culture pending  -LFT and lipase normal  -UA abn, cx pending   Urine cx from outside hospital showed mixed daniel  -pt on famotidine prn at home, use PPI iv in hospital for now     Headache, dizziness, slightly abnormal renal functions  - suspicious for dehydration  -Treatment as above, and monitor closely  -Troponin normal  -improved       Thrombocytopenia (HCC)  -mild, initial platelets 147--> repeat in the morning ~139  -pt on ASA chronically     DM2, diet controlled--> Accu-Cheks and sliding scale  H/o CVA  with residual right-sided weakness--> PT/OT  HTN--> Monitor  HL  Allergy to lisinopril     Full code     Social: From home with family     DVT prophylaxis: Heparin subcu       Dispo: likely home tomorrow with family       Chart reviewed, including current vitals, notes, labs and imaging  Pertinent past medical records reviewed  Labs ordered and medications adjusted as outlined above  Coordinate care with care team/consultants  Discussed with patient the results of tests, management plan as outlined above, and the need for ongoing hospitalization  D/w RN     MDM high  severe acute illness/or exacerbation of chronic illness posing a threat to life, IV medications, requiring close monitoring in hospital.       Results:     Lab Results   Component Value Date    WBC 5.0 06/23/2024    HGB 13.6 06/23/2024    HCT 39.2 06/23/2024    .0 (L) 06/23/2024    CREATSERUM 0.89 06/23/2024    BUN 8 (L) 06/23/2024     06/23/2024    K 3.7 06/23/2024     06/23/2024    CO2 26.0 06/23/2024     (H) 06/23/2024    CA 8.7 06/23/2024    ALB 4.1 06/23/2024    ALKPHO 88 06/22/2024    BILT 0.7 06/22/2024    TP 6.7 06/22/2024    AST 31 06/22/2024    ALT 30 06/22/2024    PTT 30.8 03/27/2019    INR 1.02 03/27/2019    T4F 0.8 03/28/2019    TSH 3.735 12/16/2023    LIP 47 06/22/2024    MG 1.8 06/23/2024    PHOS 3.5 06/23/2024    TROP <0.045 03/27/2019    B12 539 12/16/2023       CT ABDOMEN+PELVIS(CONTRAST ONLY)(CPT=74177)    Result Date: 6/22/2024  CONCLUSION:  1. No acute intra-abdominal process. 2. Lesser incidental findings as above.    Dictated by (CST): Ag Limon MD on 6/22/2024 at 2:38 PM     Finalized by (CST): Ag Limon MD on 6/22/2024 at 2:45 PM         EKG 12 Lead    Result Date: 6/23/2024  Sinus bradycardia with sinus arrhythmia Nonspecific T wave abnormality Abnormal ECG When compared with ECG of 25-APR-2023 00:43, DE interval has decreased       MAYTE WATERMAN MD  6/23/2024

## 2024-06-23 NOTE — PLAN OF CARE
Problem: Patient Centered Care  Goal: Patient preferences are identified and integrated in the patient's plan of care  Description: Interventions:  - What would you like us to know as we care for you?   - Provide timely, complete, and accurate information to patient/family  - Incorporate patient and family knowledge, values, beliefs, and cultural backgrounds into the planning and delivery of care  - Encourage patient/family to participate in care and decision-making at the level they choose  - Honor patient and family perspectives and choices  Outcome: Progressing     Problem: Patient/Family Goals  Goal: Patient/Family Long Term Goal  Description: Patient's Long Term Goal: Infection resolved!    Interventions:  - Vital signs and labs are monitored.  - Patient receives intravenous antibiotics.  - See additional Care Plan goals for specific interventions  Outcome: Progressing  Goal: Patient/Family Short Term Goal  Description: Patient's Short Term Goal: Infection resolve!    Interventions:   - Vital signs and labs are monitored.  - Patient receives iv rocephin daily.  - See additional Care Plan goals for specific interventions  Outcome: Progressing     Problem: CARDIOVASCULAR - ADULT  Goal: Maintains optimal cardiac output and hemodynamic stability  Description: INTERVENTIONS:  - Monitor vital signs, rhythm, and trends  - Monitor for bleeding, hypotension and signs of decreased cardiac output  - Evaluate effectiveness of vasoactive medications to optimize hemodynamic stability  - Monitor arterial and/or venous puncture sites for bleeding and/or hematoma  - Assess quality of pulses, skin color and temperature  - Assess for signs of decreased coronary artery perfusion - ex. Angina  - Evaluate fluid balance, assess for edema, trend weights  Outcome: Progressing  Goal: Absence of cardiac arrhythmias or at baseline  Description: INTERVENTIONS:  - Continuous cardiac monitoring, monitor vital signs, obtain 12 lead EKG if  indicated  - Evaluate effectiveness of antiarrhythmic and heart rate control medications as ordered  - Initiate emergency measures for life threatening arrhythmias  - Monitor electrolytes and administer replacement therapy as ordered  Outcome: Progressing     Problem: GENITOURINARY - ADULT  Goal: Absence of urinary retention  Description: INTERVENTIONS:  - Assess patient’s ability to void and empty bladder  - Monitor intake/output and perform bladder scan as needed  - Follow urinary retention protocol/standard of care  - Consider collaborating with pharmacy to review patient's medication profile  - Implement strategies to promote bladder emptying  Outcome: Progressing     Problem: METABOLIC/FLUID AND ELECTROLYTES - ADULT  Goal: Glucose maintained within prescribed range  Description: INTERVENTIONS:  - Monitor Blood Glucose as ordered  - Assess for signs and symptoms of hyperglycemia and hypoglycemia  - Administer ordered medications to maintain glucose within target range  - Assess barriers to adequate nutritional intake and initiate nutrition consult as needed  - Instruct patient on self management of diabetes  Outcome: Progressing  Goal: Electrolytes maintained within normal limits  Description: INTERVENTIONS:  - Monitor labs and rhythm and assess patient for signs and symptoms of electrolyte imbalances  - Administer electrolyte replacement as ordered  - Monitor response to electrolyte replacements, including rhythm and repeat lab results as appropriate  - Fluid restriction as ordered  - Instruct patient on fluid and nutrition restrictions as appropriate  Outcome: Progressing  Goal: Hemodynamic stability and optimal renal function maintained  Description: INTERVENTIONS:  - Monitor labs and assess for signs and symptoms of volume excess or deficit  - Monitor intake, output and patient weight  - Monitor urine specific gravity, serum osmolarity and serum sodium as indicated or ordered  - Monitor response to  interventions for patient's volume status, including labs, urine output, blood pressure (other measures as available)  - Encourage oral intake as appropriate  - Instruct patient on fluid and nutrition restrictions as appropriate  Outcome: Progressing     Problem: PAIN - ADULT  Goal: Verbalizes/displays adequate comfort level or patient's stated pain goal  Description: INTERVENTIONS:  - Encourage pt to monitor pain and request assistance  - Assess pain using appropriate pain scale  - Administer analgesics based on type and severity of pain and evaluate response  - Implement non-pharmacological measures as appropriate and evaluate response  - Consider cultural and social influences on pain and pain management  - Manage/alleviate anxiety  - Utilize distraction and/or relaxation techniques  - Monitor for opioid side effects  - Notify MD/LIP if interventions unsuccessful or patient reports new pain  - Anticipate increased pain with activity and pre-medicate as appropriate  Outcome: Progressing     Problem: SAFETY ADULT - FALL  Goal: Free from fall injury  Description: INTERVENTIONS:  - Assess pt frequently for physical needs  - Identify cognitive and physical deficits and behaviors that affect risk of falls.  - [place] fall precautions as indicated by assessment.  - Educate pt/family on patient safety including physical limitations  - Instruct pt to call for assistance with activity based on assessment  - Modify environment to reduce risk of injury  - Provide assistive devices as appropriate  - Consider OT/PT consult to assist with strengthening/mobility  - Encourage toileting schedule  Outcome: Progressing     Patient is presently resting in the bed. Alert x 4. Vital signs taken and stable. Tolerates diet well. Fall risk precautions are followed at all times. Intravenous fluids infusing and tolerated. Patient receives iv rocephin daily for urinary tract infection per order. Patient was seen by physical therapist per  eval and treat order today. Patient sits up in recliner rocio for all meals. Call light within reach at all times. Bed and chair alarm remains in use at all times for safety. Intake and output is monitored for safety.

## 2024-06-23 NOTE — PHYSICAL THERAPY NOTE
PHYSICAL THERAPY EVALUATION - INPATIENT     Room Number: 544/544-A  Evaluation Date: 6/23/2024  Type of Evaluation: Initial   Physician Order: PT Eval and Treat    Presenting Problem: acute cystitis without hematuria  Co-Morbidities : Hx CVA with residual R weakness  Reason for Therapy: Mobility Dysfunction and Discharge Planning    PHYSICAL THERAPY ASSESSMENT   Patient is a 84 year old female admitted 6/22/2024 for acute cystitis without hematuria.  Prior to admission, patient's baseline is independent in her home without a device, states she doesn't leave often as her daughter brings her groceries.  Patient is currently functioning near baseline with bed mobility, transfers, gait, and stair negotiation.  Patient is requiring contact guard assist as a result of the following impairments: impaired standing/dynamic balance.  Physical Therapy will continue to follow for duration of hospitalization.    Patient will benefit from continued skilled PT Services at discharge to promote functional independence and safety with additional support and return home with home health PT.    PLAN  PT Treatment Plan: Bed mobility;Body mechanics;Endurance;Energy conservation;Patient education;Family education;Gait training;Strengthening;Transfer training;Balance training;Stair training  Rehab Potential : Good  Frequency (Obs): 5x/week    PHYSICAL THERAPY MEDICAL/SOCIAL HISTORY   History related to current admission: Hx of CVA with residual R side weakness     Problem List  Principal Problem:    Acute cystitis without hematuria  Active Problems:    Thrombocytopenia (HCC)    Hyperglycemia    Abdominal pain of unknown etiology      HOME SITUATION  Home Situation  Type of Home: Apartment  Home Layout: One level  Stairs to Enter :  (Pt unable to answer # stairs but states there are stairs?)  Lives With: Alone (Pt states alone however per EMR she may have lived with daughter at some point?)  Patient Owned Equipment: None     Prior Level of  [place]: Per pt she is independent in her apartment without a device, her daughter lives close and brings her groceries/etc - pt is questionable with history due to language barrier (despite translation)/possible residual aphasia?     SUBJECTIVE  \"I don't have to go out much, she brings me my groceries.\"    PHYSICAL THERAPY EXAMINATION   OBJECTIVE  Precautions:  needed;Bed/chair alarm  Fall Risk: High fall risk    WEIGHT BEARING RESTRICTION  Weight Bearing Restriction: None                PAIN ASSESSMENT  Ratin          COGNITION  Overall Cognitive Status:  JAILENE - unable to assess    RANGE OF MOTION AND STRENGTH ASSESSMENT  Upper extremity ROM and strength are with mild strength deficits to RUE  Lower extremity ROM is within functional limits  Lower extremity strength is with mild strength deficits to RLE    BALANCE  Static Sitting: Good  Dynamic Sitting: Fair +  Static Standing: Fair  Dynamic Standing: Fair -    ADDITIONAL TESTS                                    NEUROLOGICAL FINDINGS                      ACTIVITY TOLERANCE  Pulse: 55  Heart Rate Source: Monitor                   O2 WALK  Oxygen Therapy  SPO2% on Room Air at Rest: 95    AM-PAC '6-Clicks' INPATIENT SHORT FORM - BASIC MOBILITY  How much difficulty does the patient currently have...  Patient Difficulty: Turning over in bed (including adjusting bedclothes, sheets and blankets)?: A Little   Patient Difficulty: Sitting down on and standing up from a chair with arms (e.g., wheelchair, bedside commode, etc.): A Little   Patient Difficulty: Moving from lying on back to sitting on the side of the bed?: A Little   How much help from another person does the patient currently need...   Help from Another: Moving to and from a bed to a chair (including a wheelchair)?: A Little   Help from Another: Need to walk in hospital room?: A Little   Help from Another: Climbing 3-5 steps with a railing?: A Little     AM-PAC Score:  Raw Score: 18   Approx  Degree of Impairment: 46.58%   Standardized Score (AM-PAC Scale): 43.63   CMS Modifier (G-Code): CK    FUNCTIONAL ABILITY STATUS  Functional Mobility/Gait Assessment  Gait Assistance: Contact guard assist  Distance (ft): 15ft, 8 ft  Assistive Device: None  Pattern: R Foot flat (Mild instability, slow yessy)  Sit to Supine: contact guard assist  Sit to Stand: contact guard assist    Pt received up in chair requesting to go to bathroom. Ambulates without a device with mild instability and R foot flat. She completes personal hygiene with supervision in bathroom and requests to return to bed. She needs CGA for tranfers and bed mobility. Ends session in bed with all needs in reach, alarm on and RN updated.     Exercise/Education Provided:  Energy conservation  Functional activity tolerated  Gait training  Transfer training  Safety precautions    The patient's Approx Degree of Impairment: 46.58% has been calculated based on documentation in the [facility] '6 clicks' Inpatient Basic Mobility Short Form.  Research supports that patients with this level of impairment may benefit from  PT.  Final disposition will be made by interdisciplinary medical team.    Patient End of Session: In bed;Needs met;Call light within reach;RN aware of session/findings;All patient questions and concerns addressed;Alarm set    CURRENT GOALS  Goals to be met by: 7/7/24  Patient Goal Patient's self-stated goal is: return home   Goal #1 Patient is able to demonstrate supine - sit EOB @ level: independent     Goal #1   Current Status    Goal #2 Patient is able to demonstrate transfers Sit to/from Stand at assistance level: independent with none     Goal #2  Current Status    Goal #3 Patient is able to ambulate 200 feet with assist device: none at assistance level: independent   Goal #3   Current Status    Goal #4 Patient will negotiate 4 stairs/one curb w/ assistive device and supervision   Goal #4   Current Status    Goal #5 Patient to demonstrate  independence with home activity/exercise instructions provided to patient in preparation for discharge.   Goal #5   Current Status    Goal #6    Goal #6  Current Status      Patient Evaluation Complexity Level:  History High - 3 or more personal factors and/or co-morbidities   Examination of body systems High - addressing a total of 4 or more elements   Clinical Presentation  Moderate - Evolving   Clinical Decision Making  Moderate Complexity     Therapeutic Activity:  15 minutes  Mod Complex PT Eval

## 2024-06-23 NOTE — PLAN OF CARE
Problem: Patient Centered Care  Goal: Patient preferences are identified and integrated in the patient's plan of care  Description: Interventions:  - What would you like us to know as we care for you? From home with family  - Provide timely, complete, and accurate information to patient/family  - Incorporate patient and family knowledge, values, beliefs, and cultural backgrounds into the planning and delivery of care  - Encourage patient/family to participate in care and decision-making at the level they choose  - Honor patient and family perspectives and choices  Outcome: Progressing     Problem: METABOLIC/FLUID AND ELECTROLYTES - ADULT  Goal: Glucose maintained within prescribed range  Description: INTERVENTIONS:  - Monitor Blood Glucose as ordered  - Assess for signs and symptoms of hyperglycemia and hypoglycemia  - Administer ordered medications to maintain glucose within target range  - Assess barriers to adequate nutritional intake and initiate nutrition consult as needed  - Instruct patient on self management of diabetes  Outcome: Progressing  Goal: Electrolytes maintained within normal limits  Description: INTERVENTIONS:  - Monitor labs and rhythm and assess patient for signs and symptoms of electrolyte imbalances  - Administer electrolyte replacement as ordered  - Monitor response to electrolyte replacements, including rhythm and repeat lab results as appropriate  - Fluid restriction as ordered  - Instruct patient on fluid and nutrition restrictions as appropriate  Outcome: Progressing  Goal: Hemodynamic stability and optimal renal function maintained  Description: INTERVENTIONS:  - Monitor labs and assess for signs and symptoms of volume excess or deficit  - Monitor intake, output and patient weight  - Monitor urine specific gravity, serum osmolarity and serum sodium as indicated or ordered  - Monitor response to interventions for patient's volume status, including labs, urine output, blood pressure  (other measures as available)  - Encourage oral intake as appropriate  - Instruct patient on fluid and nutrition restrictions as appropriate  Outcome: Progressing     Problem: PAIN - ADULT  Goal: Verbalizes/displays adequate comfort level or patient's stated pain goal  Description: INTERVENTIONS:  - Encourage pt to monitor pain and request assistance  - Assess pain using appropriate pain scale  - Administer analgesics based on type and severity of pain and evaluate response  - Implement non-pharmacological measures as appropriate and evaluate response  - Consider cultural and social influences on pain and pain management  - Manage/alleviate anxiety  - Utilize distraction and/or relaxation techniques  - Monitor for opioid side effects  - Notify MD/LIP if interventions unsuccessful or patient reports new pain  - Anticipate increased pain with activity and pre-medicate as appropriate  Outcome: Progressing     Problem: SAFETY ADULT - FALL  Goal: Free from fall injury  Description: INTERVENTIONS:  - Assess pt frequently for physical needs  - Identify cognitive and physical deficits and behaviors that affect risk of falls.  - [place] fall precautions as indicated by assessment.  - Educate pt/family on patient safety including physical limitations  - Instruct pt to call for assistance with activity based on assessment  - Modify environment to reduce risk of injury  - Provide assistive devices as appropriate  - Consider OT/PT consult to assist with strengthening/mobility  - Encourage toileting schedule  Outcome: Progressing   Patient in no acute distress, denies acute pain, no shortness of breath. Safety precautions in place. Daughter Sonia updated via phone

## 2024-06-24 VITALS
RESPIRATION RATE: 18 BRPM | WEIGHT: 110.19 LBS | BODY MASS INDEX: 23.77 KG/M2 | OXYGEN SATURATION: 96 % | SYSTOLIC BLOOD PRESSURE: 123 MMHG | HEART RATE: 63 BPM | DIASTOLIC BLOOD PRESSURE: 90 MMHG | TEMPERATURE: 98 F | HEIGHT: 57 IN

## 2024-06-24 LAB
GLUCOSE BLDC GLUCOMTR-MCNC: 130 MG/DL (ref 70–99)
GLUCOSE BLDC GLUCOMTR-MCNC: 251 MG/DL (ref 70–99)

## 2024-06-24 PROCEDURE — 99239 HOSP IP/OBS DSCHRG MGMT >30: CPT | Performed by: HOSPITALIST

## 2024-06-24 RX ORDER — CEFDINIR 300 MG/1
300 CAPSULE ORAL 2 TIMES DAILY
Qty: 8 CAPSULE | Refills: 0 | Status: SHIPPED | OUTPATIENT
Start: 2024-06-24 | End: 2024-06-28

## 2024-06-24 RX ORDER — OMEPRAZOLE 40 MG/1
40 CAPSULE, DELAYED RELEASE ORAL DAILY
Qty: 30 CAPSULE | Refills: 0 | Status: SHIPPED | OUTPATIENT
Start: 2024-06-24

## 2024-06-24 RX ORDER — POLYETHYLENE GLYCOL 3350 17 G/17G
17 POWDER, FOR SOLUTION ORAL DAILY PRN
Status: SHIPPED | COMMUNITY
Start: 2024-06-24

## 2024-06-24 RX ORDER — ASPIRIN 325 MG
325 TABLET ORAL DAILY
Status: SHIPPED | COMMUNITY
Start: 2024-06-24

## 2024-06-24 RX ORDER — FAMOTIDINE 20 MG/1
20 TABLET, FILM COATED ORAL 2 TIMES DAILY PRN
Status: SHIPPED | COMMUNITY
Start: 2024-06-24

## 2024-06-24 RX ORDER — LOSARTAN POTASSIUM 50 MG/1
50 TABLET ORAL DAILY
Status: SHIPPED | COMMUNITY
Start: 2024-06-24

## 2024-06-24 RX ORDER — SENNOSIDES 8.6 MG
8.6 TABLET ORAL DAILY PRN
Status: SHIPPED | COMMUNITY
Start: 2024-06-24

## 2024-06-24 NOTE — PLAN OF CARE
Problem: Patient Centered Care  Goal: Patient preferences are identified and integrated in the patient's plan of care  Description: Interventions:  - What would you like us to know as we care for you? I speak [place]  - Provide timely, complete, and accurate information to patient/family  - Incorporate patient and family knowledge, values, beliefs, and cultural backgrounds into the planning and delivery of care  - Encourage patient/family to participate in care and decision-making at the level they choose  - Honor patient and family perspectives and choices  6/24/2024 1214 by Marleni Stewart RN  Outcome: Adequate for Discharge  6/24/2024 1213 by Marleni Stewart RN  Outcome: Progressing     Problem: Patient/Family Goals  Goal: Patient/Family Long Term Goal  Description: Patient's Long Term Goal: To be discharged home    Interventions:  - Monitor vital signs  -Follow MD orders  - See additional Care Plan goals for specific interventions  6/24/2024 1214 by Marleni Stewart RN  Outcome: Adequate for Discharge  6/24/2024 1213 by Marleni Stewart RN  Outcome: Progressing  Goal: Patient/Family Short Term Goal  Description: Patient's Short Term Goal:     Interventions:   -   - See additional Care Plan goals for specific interventions  6/24/2024 1214 by Marleni Stewart RN  Outcome: Adequate for Discharge  6/24/2024 1213 by Marleni Stewart RN  Outcome: Progressing     Problem: CARDIOVASCULAR - ADULT  Goal: Maintains optimal cardiac output and hemodynamic stability  Description: INTERVENTIONS:  - Monitor vital signs, rhythm, and trends  - Monitor for bleeding, hypotension and signs of decreased cardiac output  - Evaluate effectiveness of vasoactive medications to optimize hemodynamic stability  - Monitor arterial and/or venous puncture sites for bleeding and/or hematoma  - Assess quality of pulses, skin color and temperature  - Assess for signs of decreased coronary artery perfusion - ex. Angina  - Evaluate fluid balance, assess for  edema, trend weights  6/24/2024 1214 by Marleni Stewart RN  Outcome: Adequate for Discharge  6/24/2024 1213 by Marleni Stewart RN  Outcome: Progressing  Goal: Absence of cardiac arrhythmias or at baseline  Description: INTERVENTIONS:  - Continuous cardiac monitoring, monitor vital signs, obtain 12 lead EKG if indicated  - Evaluate effectiveness of antiarrhythmic and heart rate control medications as ordered  - Initiate emergency measures for life threatening arrhythmias  - Monitor electrolytes and administer replacement therapy as ordered  6/24/2024 1214 by Marleni Stewart RN  Outcome: Adequate for Discharge  6/24/2024 1213 by Marleni Stewart RN  Outcome: Progressing     Problem: GENITOURINARY - ADULT  Goal: Absence of urinary retention  Description: INTERVENTIONS:  - Assess patient’s ability to void and empty bladder  - Monitor intake/output and perform bladder scan as needed  - Follow urinary retention protocol/standard of care  - Consider collaborating with pharmacy to review patient's medication profile  - Implement strategies to promote bladder emptying  6/24/2024 1214 by Marleni Stewart RN  Outcome: Adequate for Discharge  6/24/2024 1213 by Marleni Stewart RN  Outcome: Progressing     Problem: METABOLIC/FLUID AND ELECTROLYTES - ADULT  Goal: Glucose maintained within prescribed range  Description: INTERVENTIONS:  - Monitor Blood Glucose as ordered  - Assess for signs and symptoms of hyperglycemia and hypoglycemia  - Administer ordered medications to maintain glucose within target range  - Assess barriers to adequate nutritional intake and initiate nutrition consult as needed  - Instruct patient on self management of diabetes  6/24/2024 1214 by Marleni Stewart RN  Outcome: Adequate for Discharge  6/24/2024 1213 by Marleni Stewart RN  Outcome: Progressing  Goal: Electrolytes maintained within normal limits  Description: INTERVENTIONS:  - Monitor labs and rhythm and assess patient for signs and symptoms of electrolyte  imbalances  - Administer electrolyte replacement as ordered  - Monitor response to electrolyte replacements, including rhythm and repeat lab results as appropriate  - Fluid restriction as ordered  - Instruct patient on fluid and nutrition restrictions as appropriate  6/24/2024 1214 by Marleni Stewart RN  Outcome: Adequate for Discharge  6/24/2024 1213 by Marleni Stewart RN  Outcome: Progressing  Goal: Hemodynamic stability and optimal renal function maintained  Description: INTERVENTIONS:  - Monitor labs and assess for signs and symptoms of volume excess or deficit  - Monitor intake, output and patient weight  - Monitor urine specific gravity, serum osmolarity and serum sodium as indicated or ordered  - Monitor response to interventions for patient's volume status, including labs, urine output, blood pressure (other measures as available)  - Encourage oral intake as appropriate  - Instruct patient on fluid and nutrition restrictions as appropriate  6/24/2024 1214 by Marleni Stewart RN  Outcome: Adequate for Discharge  6/24/2024 1213 by Marleni Stewart RN  Outcome: Progressing     Problem: PAIN - ADULT  Goal: Verbalizes/displays adequate comfort level or patient's stated pain goal  Description: INTERVENTIONS:  - Encourage pt to monitor pain and request assistance  - Assess pain using appropriate pain scale  - Administer analgesics based on type and severity of pain and evaluate response  - Implement non-pharmacological measures as appropriate and evaluate response  - Consider cultural and social influences on pain and pain management  - Manage/alleviate anxiety  - Utilize distraction and/or relaxation techniques  - Monitor for opioid side effects  - Notify MD/LIP if interventions unsuccessful or patient reports new pain  - Anticipate increased pain with activity and pre-medicate as appropriate  6/24/2024 1214 by Marleni Stewart RN  Outcome: Adequate for Discharge  6/24/2024 1213 by Marleni Stewart RN  Outcome:  Progressing     Problem: SAFETY ADULT - FALL  Goal: Free from fall injury  Description: INTERVENTIONS:  - Assess pt frequently for physical needs  - Identify cognitive and physical deficits and behaviors that affect risk of falls.  - [place] fall precautions as indicated by assessment.  - Educate pt/family on patient safety including physical limitations  - Instruct pt to call for assistance with activity based on assessment  - Modify environment to reduce risk of injury  - Provide assistive devices as appropriate  - Consider OT/PT consult to assist with strengthening/mobility  - Encourage toileting schedule  6/24/2024 1214 by Marleni Stewart, RN  Outcome: Adequate for Discharge  6/24/2024 1213 by Marleni Stewart, RN  Outcome: Progressing    IV removed. Went over AVS paperwork with patient and daughter, answered all questions. I took patient down via wheelchair. Patient stable at the time of discharge

## 2024-06-24 NOTE — OCCUPATIONAL THERAPY NOTE
OCCUPATIONAL THERAPY EVALUATION - INPATIENT     Room Number: 544/544-A  Evaluation Date: 6/24/2024  Type of Evaluation: Initial  Presenting Problem: UTI    Physician Order: IP Consult to Occupational Therapy  Reason for Therapy: ADL/IADL Dysfunction and Discharge Planning    OCCUPATIONAL THERAPY ASSESSMENT   Patient is a 84 year old female admitted 6/22/2024 for UTI.  Prior to admission, patient's baseline is l;iving home alone, has supportive daughter nearby whom has cameras set up in pt apartment and checks on her often. Pt states she uses a cane occasionally, indep with self care tasks.  Patient is currently functioning near baseline with ADLs and mobility.  Patient is requiring up to sba/spv for self care and cga for mobility, difficulty following directions to keep rw close to body as a result of the following impairments: balance, insight to deficits. Occupational Therapy will continue to follow for duration of hospitalization.    Patient will benefit from continued skilled OT Services at discharge to promote functional independence in home.  Anticipate patient will return home with home health OT    PLAN  OT Treatment Plan: Balance activities;Energy conservation/work simplification techniques;ADL training;Functional transfer training;Endurance training;UE strengthening/ROM;Patient/Family education;Patient/Family training  OT Device Recommendations: TBD    OCCUPATIONAL THERAPY MEDICAL/SOCIAL HISTORY   Problem List  Principal Problem:    Acute cystitis without hematuria  Active Problems:    Thrombocytopenia (HCC)    Hyperglycemia    Abdominal pain of unknown etiology    HOME SITUATION  Type of Home: Apartment  Home Layout: One level  Lives With: Alone (dghtr has cameras in pt home to watch and lives nearby)  Toilet and Equipment: Standard height toilet  Shower/Tub and Equipment: Shower chair  Other Equipment: -- (cane)  Drives: No      SUBJECTIVE  Pt agreeable therapy session    OCCUPATIONAL THERAPY EXAMINATION     OBJECTIVE  Precautions:  needed  Fall Risk: High fall risk    PAIN ASSESSMENT  Ratin    COGNITION  Overall Cognitive Status:  WFL - within functional limits  A&Ox3    RANGE OF MOTION   Upper extremity ROM is within functional limits     STRENGTH ASSESSMENT  Upper extremity strength is within functional limits     COORDINATION  Gross Motor: WFL   Fine Motor: WFL     ACTIVITIES OF DAILY LIVING ASSESSMENT  AM-PAC ‘6-Clicks’ Inpatient Daily Activity Short Form  How much help from another person does the patient currently need…  -   Putting on and taking off regular lower body clothing?: A Little  -   Bathing (including washing, rinsing, drying)?: A Little  -   Toileting, which includes using toilet, bedpan or urinal? : A Little  -   Putting on and taking off regular upper body clothing?: None  -   Taking care of personal grooming such as brushing teeth?: A Little  -   Eating meals?: None    AM-PAC Score:  Score: 20  Approx Degree of Impairment: 38.32%  Standardized Score (AM-PAC Scale): 42.03  CMS Modifier (G-Code): CJ    FUNCTIONAL TRANSFER ASSESSMENT  Sit to Stand: Chair  Chair: Stand-by Assist    BED MOBILITY     BALANCE ASSESSMENT     FUNCTIONAL ADL ASSESSMENT  Eating: Independent  Grooming Standing: Supervision  Bathing Seated: Supervision  UB Dressing Seated: Independent  LB Dressing Seated: Supervision  Toileting Seated: Modified Independent      Skilled Therapy Provided:   Pt engaged in room mobility with rw and cga, pt tends to push aside when close tfr surface. Attempts to ambulate with no AD, however reaching for furniture and unsteady. Able  to complete toilet hygiene task with sba and grooming afterwards.     All questions and concerns addressed.     EDUCATION PROVIDED  Educated pt on role of OT in acute care setting, activity pacing, compensatory strategies, pursed lip breathing, transfer training, physiological benefits of functional mobility/OOB activity and POC - pt verbalized  understanding.       The patient's Approx Degree of Impairment: 38.32% has been calculated based on documentation in the [facility] '6 clicks' Inpatient Daily Activity Short Form.  Research supports that patients with this level of impairment may benefit from hhot.  Final disposition will be made by interdisciplinary medical team.     Patient End of Session: Up in chair    OT Goals  Patients self stated goal is: did not state      Patient will complete functional transfer with mod I   Comment:     Patient will complete toileting with indep  Comment:     Patient will tolerate standing for 3-5 minutes in prep for adls with mod I    Comment:    Patient will complete item retrieval with mod I   Comment:          Goals  on:   Frequency: 1-2 f/u    Makeda Lobo OTR/L  [facility]       Patient Evaluation Complexity Level:   Occupational Profile/Medical History LOW - Brief history including review of medical or therapy records    Specific performance deficits impacting engagement in ADL/IADL LOW  1 - 3 performance deficits    Client Assessment/Performance Deficits LOW - No comorbidities nor modifications of tasks    Clinical Decision Making LOW - Analysis of occupational profile, problem-focused assessments, limited treatment options    Overall Complexity LOW       Self-Care Home Management: 20 minutes

## 2024-06-24 NOTE — DISCHARGE SUMMARY
[place] HOSPITALIST  DISCHARGE SUMMARY     Maria T Ferguson Patient Status:  Inpatient    5/10/1940 MRN [id number]   Location [facility] 5SW/SE Attending No att. providers found   Hosp Day # 2 PCP Rebecca Patel MD     Date of Admission: 2024  Date of Discharge: 2024  Discharge Disposition: Home or Self Care    Admitting Chief Complaint:   Abdominal pain of unknown etiology [R10.9]  Acute cystitis without hematuria [N30.00]    PCP: Rebecca Patel MD    Discharge Diagnosis:   Acute cystitis without hematuria, possible pyelonephritis with bilateral flank pains  Also epigastric and suprapubic Abdominal pains, improved       Headache, dizziness, slightly abnormal renal functions  - suspicious for dehydration, improved     Thrombocytopenia (HCC)  DM2, diet controlled  H/o CVA with residual right-sided weakness  HTN  HL  Allergy to lisinopril          History of Present Illness:   84 year old female with h/o diet-controlled diabetes, hypertension, CVA with right residual weakness, started having abdominal pains, flank and back pains associated with nausea and dizziness for few days, was seen at ER in [place] on 6/15, found to have UTI, prescribed Bactrim, that patient was taking, now back home feeling worse.  Also very poor appetite, myalgia, but no high fevers, no vomiting.           Brief Synopsis:   Acute cystitis without hematuria, possible pyelonephritis with bilateral flank pains  Abdominal pain probably from UTI, now resolved  S/p Bactrim x 3 days with worsening symptoms  -Lactic acid 0.8 (WNL)  -Blood culture pending, NTD  -LFT and lipase normal  -UA abn, cx pending   Urine cx from outside hospital showed mixed daniel  -pt on famotidine prn at home, use PPI in hospital   -abd pains improved, probably combination of UTI and some dyspepsia  -->cont PPI after discharge for few weeks, if persistent dyspepsia, follow up with PCP/?GI outpatient     Headache, dizziness, slightly abnormal renal  functions  - suspicious for dehydration  -Treatment as above, and monitor closely  -Troponin normal  -improved       Thrombocytopenia (HCC)  -mild, initial platelets 147--> repeat in the morning ~139  -pt on ASA chronically     DM2, diet controlled--> Accu-Cheks and sliding scale  H/o CVA with residual right-sided weakness--> PT/OT    HTN--> pt noticed to have low pulse, metoprolol dose first decreased, eventually discontinued  -->cont norvasc and modified dose of losartan  -->pt to monitor BP at home and follow up with PCP  -->discussed with daughter    HL  Allergy to lisinopril     Full code     Social: From home with family     DVT prophylaxis: Heparin subcu        Dispo:  home          Discharge Medication List:     Discharge Medications        START taking these medications        Instructions Prescription details   cefdinir 300 MG Caps  Commonly known as: Omnicef      Take 1 capsule (300 mg total) by mouth 2 (two) times daily for 4 days.   Stop taking on: June 28, 2024  Quantity: 8 capsule  Refills: 0     magnesium 250 MG Tabs      Take 1 tablet (250 mg total) by mouth.   Refills: 0     Omeprazole 40 MG Cpdr      Take 1 capsule (40 mg total) by mouth daily.   Quantity: 30 capsule  Refills: 0     Polyethylene Glycol 3350 17 g Pack  Commonly known as: MIRALAX      Take 17 g by mouth daily as needed (constipation).   Refills: 0            CHANGE how you take these medications        Instructions Prescription details   amLODIPine 5 MG Tabs  Commonly known as: Norvasc  What changed: when to take this      Take 1 tablet (5 mg total) by mouth daily.   Quantity: 100 tablet  Refills: 2     famotidine 20 MG Tabs  Commonly known as: Pepcid  What changed:   when to take this  reasons to take this      Take 1 tablet (20 mg total) by mouth 2 (two) times daily as needed for Heartburn.   Refills: 0     losartan 50 MG Tabs  Commonly known as: Cozaar  What changed:   when to take this  additional instructions      Take 1 tablet  (50 mg total) by mouth daily. In the morning and half tablet (25 mg total) in the evening   Refills: 0            CONTINUE taking these medications        Instructions Prescription details   aspirin 325 MG Tabs      Take 1 tablet (325 mg total) by mouth daily.   Refills: 0     atorvastatin 80 MG Tabs  Commonly known as: Lipitor      Take 1 tablet (80 mg total) by mouth nightly.   Quantity: 90 tablet  Refills: 3     Glucerna Liqd      Take 1 Dose by mouth daily as needed.   Quantity: 90 Bottle  Refills: 1     LORATADINE OR      Take by mouth daily as needed.   Refills: 0     MULTI-DAY VITAMINS OR      Take 1 tablet by mouth daily.   Refills: 0     PARoxetine 10 MG Tabs  Commonly known as: Paxil      Take 1 tablet (10 mg total) by mouth daily.   Quantity: 90 tablet  Refills: 3     sennosides 8.6 MG Tabs  Commonly known as: Senokot      1 tablet (8.6 mg total) by Per G Tube route daily as needed.   Refills: 0     Vitamin D3 75 MCG (3000 UT) Tabs      Take 3,000 Units by mouth daily.   Refills: 0            STOP taking these medications      metoprolol tartrate 50 MG Tabs  Commonly known as: Lopressor        PreviDent 5000 Booster Plus 1.1 % Pste  Generic drug: Sodium Fluoride                  Where to Get Your Medications        These medications were sent to [id number] [address] [facility] [address] [facility], [phone], [phone]  [address] [place] [id number]      Phone: [phone]   cefdinir 300 MG Caps  Omeprazole 40 MG Cpdr         Follow-up appointment:   Rebecca Patel MD  [address] [place] [id number] [phone]    Follow up in 1 week(s)  As needed      Vital signs:  Temp:  [98 °F (36.7 °C)-98.4 °F (36.9 °C)] 98.1 °F (36.7 °C)  Pulse:  [54-65] 63  Resp:  [18] 18  BP: (116-171)/(54-90) 123/90  SpO2:  [94 %-96 %] 96 %    Physical Exam:    General: No acute distress. Much better today, comfortable  Respiratory: Clear to auscultation  bilaterally. No wheezes. No rhonchi.  Cardiovascular: S1, S2. Regular rate and rhythm. No murmurs, rubs or gallops.   Abdomen: Soft, nontender, nondistended.  Positive bowel sounds. No rebound or guarding.  Neurologic: No new focal neurological deficits. Mild residual chronic Rt sided weakness  Musculoskeletal: Moves all extremities.  Extremities: No edema.  -----------------------------------------------------------------------------------------------  PATIENT DISCHARGE INSTRUCTIONS: See electronic chart    I d/w pt and family the available results, management plan, medications changes, and discharge instructions including follow ups as outlined above.   Scripts sent to pharmacy.      Hospital Discharge Diagnoses:  UTI    Lace+ Score: 69  59-90 High Risk  29-58 Medium Risk  0-28   Low Risk.    TCM Follow-Up Recommendation:  LACE > 58: High Risk of readmission after discharge from the hospital.        MAYTE WATERMAN MD 6/24/2024    Time spent:  > 30 minutes

## 2024-06-24 NOTE — PLAN OF CARE
Problem: Patient Centered Care  Goal: Patient preferences are identified and integrated in the patient's plan of care  Description: Interventions:  - What would you like us to know as we care for you? From home with family  - Provide timely, complete, and accurate information to patient/family  - Incorporate patient and family knowledge, values, beliefs, and cultural backgrounds into the planning and delivery of care  - Encourage patient/family to participate in care and decision-making at the level they choose  - Honor patient and family perspectives and choices  Outcome: Progressing     Problem: CARDIOVASCULAR - ADULT  Goal: Maintains optimal cardiac output and hemodynamic stability  Description: INTERVENTIONS:  - Monitor vital signs, rhythm, and trends  - Monitor for bleeding, hypotension and signs of decreased cardiac output  - Evaluate effectiveness of vasoactive medications to optimize hemodynamic stability  - Monitor arterial and/or venous puncture sites for bleeding and/or hematoma  - Assess quality of pulses, skin color and temperature  - Assess for signs of decreased coronary artery perfusion - ex. Angina  - Evaluate fluid balance, assess for edema, trend weights  Outcome: Progressing     Problem: CARDIOVASCULAR - ADULT  Goal: Absence of cardiac arrhythmias or at baseline  Description: INTERVENTIONS:  - Continuous cardiac monitoring, monitor vital signs, obtain 12 lead EKG if indicated  - Evaluate effectiveness of antiarrhythmic and heart rate control medications as ordered  - Initiate emergency measures for life threatening arrhythmias  - Monitor electrolytes and administer replacement therapy as ordered  Outcome: Progressing     Problem: GENITOURINARY - ADULT  Goal: Absence of urinary retention  Description: INTERVENTIONS:  - Assess patient’s ability to void and empty bladder  - Monitor intake/output and perform bladder scan as needed  - Follow urinary retention protocol/standard of care  - Consider  collaborating with pharmacy to review patient's medication profile  - Implement strategies to promote bladder emptying  Outcome: Progressing     Problem: METABOLIC/FLUID AND ELECTROLYTES - ADULT  Goal: Glucose maintained within prescribed range  Description: INTERVENTIONS:  - Monitor Blood Glucose as ordered  - Assess for signs and symptoms of hyperglycemia and hypoglycemia  - Administer ordered medications to maintain glucose within target range  - Assess barriers to adequate nutritional intake and initiate nutrition consult as needed  - Instruct patient on self management of diabetes  Outcome: Progressing     Problem: PAIN - ADULT  Goal: Verbalizes/displays adequate comfort level or patient's stated pain goal  Description: INTERVENTIONS:  - Encourage pt to monitor pain and request assistance  - Assess pain using appropriate pain scale  - Administer analgesics based on type and severity of pain and evaluate response  - Implement non-pharmacological measures as appropriate and evaluate response  - Consider cultural and social influences on pain and pain management  - Manage/alleviate anxiety  - Utilize distraction and/or relaxation techniques  - Monitor for opioid side effects  - Notify MD/LIP if interventions unsuccessful or patient reports new pain  - Anticipate increased pain with activity and pre-medicate as appropriate  Outcome: Progressing     Problem: SAFETY ADULT - FALL  Goal: Free from fall injury  Description: INTERVENTIONS:  - Assess pt frequently for physical needs  - Identify cognitive and physical deficits and behaviors that affect risk of falls.  - [place] fall precautions as indicated by assessment.  - Educate pt/family on patient safety including physical limitations  - Instruct pt to call for assistance with activity based on assessment  - Modify environment to reduce risk of injury  - Provide assistive devices as appropriate  - Consider OT/PT consult to assist with strengthening/mobility  -  Encourage toileting schedule  Outcome: Progressing   No acute changes, patient in no acute distress, denies pain, no shortness of breath, unlabored respirations. Safety precautions in place

## 2024-06-25 ENCOUNTER — PATIENT OUTREACH (OUTPATIENT)
Dept: CASE MANAGEMENT | Age: 84
End: 2024-06-25

## 2024-06-25 ENCOUNTER — TELEPHONE (OUTPATIENT)
Dept: FAMILY MEDICINE CLINIC | Facility: CLINIC | Age: 84
End: 2024-06-25

## 2024-06-25 NOTE — TELEPHONE ENCOUNTER
Shyla BOCANEGRA #[id number]     Left message on mailbox for pt/dtr Sonia to call NCM back for TCM--second attempt. Patient does not have an appointment scheduled at this time.      TCM appointment recommended by 7/01/2024, as patient is a High risk for readmission.  Please advise.    BOOK BY DATE (last date for TCM): 7/08/2024    Please follow-up with patient/dtr and try to get them to schedule as patient would greatly benefit from TCM appointment.  Thank you!

## 2024-06-25 NOTE — PROGRESS NOTES
NCM placed TCM call to patient with the assistance of a : Adrián #[id number], ALVIN requesting a call back to [phone] with a condition update.     Discharge Dx:   Acute cystitis without hematuria, possible pyelonephritis with bilateral flank pains  Abdominal pain of unknown etiology  GERD, unspecified whether esophagitis present   Thrombocytopenia (HCC)  DM2, diet controlled (last A1c = 6.3 on 12/16/23)  H/o CVA with residual right-sided weakness  HTN  HL

## 2024-06-25 NOTE — PROGRESS NOTES
Shyla BOCANEGRA #[id number]     Left message on mailbox for pt/dtr Sonia to call [facility] back for TCM.  [facility] contact information [phone] included in message. Sent TE to office staff for appt f/u, as pt high risk for readmission.

## 2024-06-26 ENCOUNTER — HOSPITAL ENCOUNTER (EMERGENCY)
Facility: HOSPITAL | Age: 84
Discharge: HOME | End: 2024-06-26
Attending: EMERGENCY MEDICINE

## 2024-06-26 ENCOUNTER — APPOINTMENT (OUTPATIENT)
Dept: GENERAL RADIOLOGY | Facility: HOSPITAL | Age: 84
End: 2024-06-26
Attending: EMERGENCY MEDICINE

## 2024-06-26 VITALS
SYSTOLIC BLOOD PRESSURE: 128 MMHG | RESPIRATION RATE: 15 BRPM | BODY MASS INDEX: 23.73 KG/M2 | HEART RATE: 63 BPM | TEMPERATURE: 98 F | DIASTOLIC BLOOD PRESSURE: 60 MMHG | WEIGHT: 110 LBS | OXYGEN SATURATION: 96 % | HEIGHT: 57 IN

## 2024-06-26 DIAGNOSIS — R07.9 CHEST PAIN OF UNCERTAIN ETIOLOGY: Primary | ICD-10-CM

## 2024-06-26 LAB
ANION GAP SERPL CALC-SCNC: 12 MMOL/L (ref 0–18)
ATRIAL RATE: 70 BPM
ATRIAL RATE: 81 BPM
BASOPHILS # BLD AUTO: 0.02 X10(3) UL (ref 0–0.2)
BASOPHILS NFR BLD AUTO: 0.5 %
BUN BLD-MCNC: 11 MG/DL (ref 9–23)
BUN/CREAT SERPL: 9.9 (ref 10–20)
CALCIUM BLD-MCNC: 9.3 MG/DL (ref 8.7–10.4)
CHLORIDE SERPL-SCNC: 108 MMOL/L (ref 98–112)
CO2 SERPL-SCNC: 22 MMOL/L (ref 21–32)
CREAT BLD-MCNC: 1.11 MG/DL
D DIMER PPP FEU-MCNC: 0.38 UG/ML FEU (ref ?–0.83)
DEPRECATED RDW RBC AUTO: 38.4 FL (ref 35.1–46.3)
EGFRCR SERPLBLD CKD-EPI 2021: 49 ML/MIN/1.73M2 (ref 60–?)
EOSINOPHIL # BLD AUTO: 0.13 X10(3) UL (ref 0–0.7)
EOSINOPHIL NFR BLD AUTO: 3.1 %
ERYTHROCYTE [DISTWIDTH] IN BLOOD BY AUTOMATED COUNT: 12.2 % (ref 11–15)
GLUCOSE BLD-MCNC: 247 MG/DL (ref 70–99)
HCT VFR BLD AUTO: 36.6 %
HGB BLD-MCNC: 13.6 G/DL
IMM GRANULOCYTES # BLD AUTO: 0.01 X10(3) UL (ref 0–1)
IMM GRANULOCYTES NFR BLD: 0.2 %
LYMPHOCYTES # BLD AUTO: 1.12 X10(3) UL (ref 1–4)
LYMPHOCYTES NFR BLD AUTO: 26.4 %
MCH RBC QN AUTO: 32.2 PG (ref 26–34)
MCHC RBC AUTO-ENTMCNC: 37.2 G/DL (ref 31–37)
MCV RBC AUTO: 86.5 FL
MONOCYTES # BLD AUTO: 0.28 X10(3) UL (ref 0.1–1)
MONOCYTES NFR BLD AUTO: 6.6 %
NEUTROPHILS # BLD AUTO: 2.68 X10 (3) UL (ref 1.5–7.7)
NEUTROPHILS # BLD AUTO: 2.68 X10(3) UL (ref 1.5–7.7)
NEUTROPHILS NFR BLD AUTO: 63.2 %
OSMOLALITY SERPL CALC.SUM OF ELEC: 302 MOSM/KG (ref 275–295)
P AXIS: 54 DEGREES
P AXIS: 54 DEGREES
P-R INTERVAL: 158 MS
P-R INTERVAL: 166 MS
PLATELET # BLD AUTO: 136 10(3)UL (ref 150–450)
PLATELETS.RETICULATED NFR BLD AUTO: 4.2 % (ref 0–7)
POTASSIUM SERPL-SCNC: 3.8 MMOL/L (ref 3.5–5.1)
Q-T INTERVAL: 380 MS
Q-T INTERVAL: 402 MS
QRS DURATION: 76 MS
QRS DURATION: 82 MS
QTC CALCULATION (BEZET): 434 MS
QTC CALCULATION (BEZET): 441 MS
R AXIS: -7 DEGREES
R AXIS: 23 DEGREES
RBC # BLD AUTO: 4.23 X10(6)UL
SODIUM SERPL-SCNC: 142 MMOL/L (ref 136–145)
T AXIS: 90 DEGREES
T AXIS: 91 DEGREES
TROPONIN I SERPL HS-MCNC: 26 NG/L
TROPONIN I SERPL HS-MCNC: 27 NG/L
VENTRICULAR RATE: 70 BPM
VENTRICULAR RATE: 81 BPM
WBC # BLD AUTO: 4.2 X10(3) UL (ref 4–11)

## 2024-06-26 PROCEDURE — 85025 COMPLETE CBC W/AUTO DIFF WBC: CPT | Performed by: EMERGENCY MEDICINE

## 2024-06-26 PROCEDURE — S0028 INJECTION, FAMOTIDINE, 20 MG: HCPCS | Performed by: EMERGENCY MEDICINE

## 2024-06-26 PROCEDURE — 99284 EMERGENCY DEPT VISIT MOD MDM: CPT

## 2024-06-26 PROCEDURE — 85379 FIBRIN DEGRADATION QUANT: CPT | Performed by: EMERGENCY MEDICINE

## 2024-06-26 PROCEDURE — 93005 ELECTROCARDIOGRAM TRACING: CPT

## 2024-06-26 PROCEDURE — 71045 X-RAY EXAM CHEST 1 VIEW: CPT | Performed by: EMERGENCY MEDICINE

## 2024-06-26 PROCEDURE — 96374 THER/PROPH/DIAG INJ IV PUSH: CPT

## 2024-06-26 PROCEDURE — 99285 EMERGENCY DEPT VISIT HI MDM: CPT

## 2024-06-26 PROCEDURE — 93010 ELECTROCARDIOGRAM REPORT: CPT

## 2024-06-26 PROCEDURE — 84484 ASSAY OF TROPONIN QUANT: CPT | Performed by: EMERGENCY MEDICINE

## 2024-06-26 PROCEDURE — 80048 BASIC METABOLIC PNL TOTAL CA: CPT | Performed by: EMERGENCY MEDICINE

## 2024-06-26 RX ORDER — FAMOTIDINE 10 MG/ML
20 INJECTION, SOLUTION INTRAVENOUS ONCE
Status: COMPLETED | OUTPATIENT
Start: 2024-06-26 | End: 2024-06-26

## 2024-06-26 RX ORDER — MAGNESIUM HYDROXIDE/ALUMINUM HYDROXICE/SIMETHICONE 120; 1200; 1200 MG/30ML; MG/30ML; MG/30ML
30 SUSPENSION ORAL ONCE
Status: COMPLETED | OUTPATIENT
Start: 2024-06-26 | End: 2024-06-26

## 2024-06-26 NOTE — ED PROVIDER NOTES
Patient Seen in: [facility] Emergency Department    History     Chief Complaint   Patient presents with    Chest Pain Angina       HPI    84-year-old female who is here with her second day of substernal chest pressure radiating to her left scapula, nonexertional, not associated with dyspnea.  Denies any recent immobilization, surgery, unilateral lower extremity edema, known cancer history, or recent travel. No hx of DVT or PE.       History reviewed.   Past Medical History:    Age-related nuclear cataract of both eyes    Anxiety state, unspecified    CVA (cerebral vascular accident) (HCC)    minor    Headache    High blood pressure    Hypercholesteremia    Hypertension    Syncope    2D echo ventriular wall thickening    Type II or unspecified type diabetes mellitus without mention of complication, not stated as uncontrolled    Unspecified essential hypertension       History reviewed.   Past Surgical History:   Procedure Laterality Date    Cataract extraction w/  intraocular lens implant Left 1/11/16    [facility]    Cataract extraction w/  intraocular lens implant Right 2/1/16    [facility]    Colonoscopy N/A 7/20/2017    Procedure: COLONOSCOPY;  Surgeon: Grayson Ortiz MD;  Location: [facility] ENDOSCOPY    Hysterectomy  1980    Tube insertion  11/2018    Yag capsulotomy - od - right eye Right 01/17/2018    [facility]    Yag capsulotomy - os - left eye Left 01/24/2018    [facility]         Medications :  (Not in a hospital admission)       Family History   Problem Relation Age of Onset    Diabetes Other     Hypertension Other     Lipids Other         hyperlipidemia    Breast Cancer Daughter 55    Glaucoma Neg     Macular degeneration Neg        Smoking Status:   Social History     Socioeconomic History    Marital status:    Tobacco Use    Smoking status: Never    Smokeless tobacco: Never   Vaping Use    Vaping status: Never Used   Substance and Sexual Activity    Alcohol use: No    Drug use: No   Other Topics Concern    Caffeine  Concern Yes     Comment: coffee 1 cup    Exercise No    Pt has a pacemaker No    Pt has a defibrillator No    Reaction to local anesthetic No       Constitutional and vital signs reviewed.      Social History and Family History elements reviewed from today, pertinent positives to the presenting problem noted.    Physical Exam     ED Triage Vitals [06/26/24 1242]   /66   Pulse 90   Resp 18   Temp 96.6 °F (35.9 °C)   Temp src Pulmonary Ar   SpO2 99 %   O2 Device None (Room air)       All measures to prevent infection transmission during my interaction with the patient were taken. The patient was already wearing a droplet mask on my arrival to the room. Personal protective equipment was worn throughout the duration of the exam.  Handwashing was performed prior to and after the exam.  Stethoscope and any equipment used during my examination was cleaned with super sani-cloth germicidal wipes following the exam.     Physical Exam    General: NAD  Head: Normocephalic and atraumatic.  Mouth/Throat/Ears/Nose: Oropharynx is clear   Eyes: Conjunctivae and EOM are normal.   Neck: Normal range of motion. Supple.   Cardiovascular: Normal rate, regular rhythm, normal heart sounds. radial pulses 2+, equal and symmetric   Respiratory/Chest: Clear and equal bilaterally. Exhibits no tenderness.  Gastrointestinal: Soft, non-tender, non-distended. Bowel sounds are normal.   Musculoskeletal:No swelling or deformity.   Neurological: Alert and appropriate. No focal deficits.   Skin: Skin is warm and dry. No pallor.  Psychiatric: Has a normal mood and affect.      ED Course        Labs Reviewed   BASIC METABOLIC PANEL (8) - Abnormal; Notable for the following components:       Result Value    Glucose 247 (*)     Creatinine 1.11 (*)     BUN/CREA Ratio 9.9 (*)     Calculated Osmolality 302 (*)     eGFR-Cr 49 (*)     All other components within normal limits   CBC W/ DIFFERENTIAL - Abnormal; Notable for the following components:    MCHC  37.2 (*)     .0 (*)     All other components within normal limits   TROPONIN I HIGH SENSITIVITY - Normal   D-DIMER - Normal   TROPONIN I HIGH SENSITIVITY - Normal   CBC WITH DIFFERENTIAL WITH PLATELET    Narrative:     The following orders were created for panel order CBC With Differential With Platelet.                  Procedure                               Abnormality         Status                                     ---------                               -----------         ------                                     CBC W/ DIFFERENTIAL[[id number]]          Abnormal            Final result                                                 Please view results for these tests on the individual orders.     EKG    Rate, intervals and axes as noted on EKG Report.  Rate: 81  Rhythm: Sinus Rhythm  Reading: No STEMI.  This is my interpretation.    EKG at 2:59 PM interpretation by me: Normal sinus rhythm at rate 70, normal axis, normal intervals, no STEMI.  This my interpretation.  No significant change from the earlier EKG.           As Interpreted by me    Imaging Results Available and Reviewed while in ED: XR CHEST AP PORTABLE  (CPT=71045)    Result Date: 6/26/2024  CONCLUSION:  1. No acute disease in the chest.    Dictated by (CST): Nick Sousa MD on 6/26/2024 at 1:41 PM     Finalized by (CST): Nick Sousa MD on 6/26/2024 at 1:41 PM         ED Medications Administered:   Medications   alum-mag hydroxide-simethicone (Maalox) 200-200-20 MG/5ML oral suspension 30 mL (30 mL Oral Given 6/26/24 1330)   famotidine (Pepcid) 20 mg/2mL injection 20 mg (20 mg Intravenous Given 6/26/24 1330)         MDM     Vitals:    06/26/24 1247 06/26/24 1300 06/26/24 1530 06/26/24 1600   BP: 111/66 137/47 (!) 113/98 128/60   Pulse: 87 78 67 63   Resp: 20 (!) 27 17 15   Temp: 98.3 °F (36.8 °C)      TempSrc:       SpO2: 97% 96% 95% 96%   Weight: 49.9 kg      Height: 144.8 cm (4' 9\")        *I personally reviewed and interpreted all  ED vitals.    Pulse Ox: 96%, Room air, Normal     Monitor Interpretation:   normal sinus rhythm as interpreted by me.  The cardiac monitor was ordered given chest pain.      Medical Decision Making      Differential Diagnosis/ Diagnostic Considerations: ACS, GERD, PE    Complicating Factors: The patient already has diabetes to contribute to the complexity of this ED evaluation.    I reviewed prior chart records including discharge summary from June 22, 2024 admission.  Patient here with nonspecific chest pain, serial troponin x 2  unremarkable, presentation inconsistent with ACS.  D-dimer negative, inconsistent with pulmonary embolus.    Discharged in stable condition.  Patient  and daughter are comfortable with the plan.  Chest x-ray unremarkable for acute findings on my interpretation.      Disposition and Plan     Clinical Impression:  1. Chest pain of uncertain etiology        Disposition:  Discharge    Follow-up:  Rebecca Patel MD  [address] [place] [address] [phone]    Schedule an appointment as soon as possible for a visit in 1 day(s)        Medications Prescribed:  Discharge Medication List as of 6/26/2024  4:19 PM

## 2024-06-26 NOTE — ED INITIAL ASSESSMENT (HPI)
Patient presents to ER with complaints of chest and back pain since today. Also notes headache.   HX stroke.       Daughter to translate in triage, patient unable to respond with language line interpretor.

## 2024-06-28 NOTE — PAYOR COMM NOTE
--------------  DISCHARGE REVIEW    Payor: [employer]  Subscriber #:  [id number]  Authorization Number: [id number]    Admit date: 6/22/24  Admit time:   4:18 PM  Discharge Date: 6/24/2024  1:21 PM     DISCHARGE SUMMARY       Date of Admission: 6/22/2024  Date of Discharge: 6/24/2024  Discharge Disposition: Home or Self Care    Admitting Chief Complaint:   Abdominal pain of unknown etiology [R10.9]  Acute cystitis without hematuria [N30.00]    Discharge Diagnosis:   Acute cystitis without hematuria, possible pyelonephritis with bilateral flank pains  Also epigastric and suprapubic Abdominal pains, improved       Headache, dizziness, slightly abnormal renal functions  - suspicious for dehydration, improved     Thrombocytopenia (HCC)  DM2, diet controlled  H/o CVA with residual right-sided weakness  HTN  HL  Allergy to lisinopril      History of Present Illness:   84 year old female with h/o diet-controlled diabetes, hypertension, CVA with right residual weakness, started having abdominal pains, flank and back pains associated with nausea and dizziness for few days, was seen at ER in [place] on 6/15, found to have UTI, prescribed Bactrim, that patient was taking, now back home feeling worse.  Also very poor appetite, myalgia, but no high fevers, no vomiting.     Brief Synopsis:   Acute cystitis without hematuria, possible pyelonephritis with bilateral flank pains  Abdominal pain probably from UTI, now resolved  S/p Bactrim x 3 days with worsening symptoms  -Lactic acid 0.8 (WNL)  -Blood culture pending, NTD  -LFT and lipase normal  -UA abn, cx pending   Urine cx from outside hospital showed mixed daniel  -pt on famotidine prn at home, use PPI in hospital   -abd pains improved, probably combination of UTI and some dyspepsia  -->cont PPI after discharge for few weeks, if persistent dyspepsia, follow up with PCP/?GI outpatient     Headache, dizziness, slightly abnormal renal functions  - suspicious for  dehydration  -Treatment as above, and monitor closely  -Troponin normal  -improved       Thrombocytopenia (HCC)  -mild, initial platelets 147--> repeat in the morning ~139  -pt on ASA chronically     DM2, diet controlled--> Accu-Cheks and sliding scale  H/o CVA with residual right-sided weakness--> PT/OT    HTN--> pt noticed to have low pulse, metoprolol dose first decreased, eventually discontinued  -->cont norvasc and modified dose of losartan  -->pt to monitor BP at home and follow up with PCP  -->discussed with daughter    HL  Allergy to lisinopril     Full code     Social: From home with family     DVT prophylaxis: Heparin subcu        Dispo:  home    Vital signs:  Temp:  [98 °F (36.7 °C)-98.4 °F (36.9 °C)] 98.1 °F (36.7 °C)  Pulse:  [54-65] 63  Resp:  [18] 18  BP: (116-171)/(54-90) 123/90  SpO2:  [94 %-96 %] 96 %    Physical Exam:    General: No acute distress. Much better today, comfortable  Respiratory: Clear to auscultation bilaterally. No wheezes. No rhonchi.  Cardiovascular: S1, S2. Regular rate and rhythm. No murmurs, rubs or gallops.   Abdomen: Soft, nontender, nondistended.  Positive bowel sounds. No rebound or guarding.  Neurologic: No new focal neurological deficits. Mild residual chronic Rt sided weakness  Musculoskeletal: Moves all extremities.  Extremities: No edema.  -----------------------------------------------------------------------------------------------

## 2024-06-28 NOTE — PAYOR COMM NOTE
--------------  CONTINUED STAY REVIEW    Payor: [employer]  Subscriber #:  [id number]  Authorization Number: [id number]    Admit date: 6/22/24  Admit time:  4:18 PM      6/23/24    Much better today, abdominal pain resolved, eating breakfast now  No high fevers  No vomiting  No  dizziness     Objective:   Blood pressure 134/63, pulse 60, temperature 97.9 °F (36.6 °C), temperature source Oral, resp. rate 18, height 4' 9\" (1.448 m), weight 110 lb 3.2 oz (50 kg), SpO2 95%, not currently breastfeeding.     GEN: awake, comfortable, NAD  HEENT: conjunctivae/corneas clear. PERRL, EOM's intact.  Neck: no adenopathy, no carotid bruit, no JVD, supple  Pulmonary:  clear to auscultation bilaterally  Cardiovascular: S1, S2 normal, no murmur, click, rub or gallop, regular rate and rhythm  Abdominal: soft, non-tender; bowel sounds normal; no masses,  no organomegaly  Extremities: no cyanosis or edema  Pulses: palpable and symmetric  Skin: No rashes or lesions, warm and dry  Neurologic: Alert and oriented X 3, chronic residual right sided weakness  Psychiatric: calm, cooperative     Assessment and Plan:      Acute cystitis without hematuria, possible pyelonephritis with bilateral flank pains  Abdominal pain probably from UTI, now resolved  S/p Bactrim x 3 days with worsening symptoms  -Lactic acid 0.8 (WNL)  -Blood culture pending  -LFT and lipase normal  -UA abn, cx pending   Urine cx from outside hospital showed mixed daniel  -pt on famotidine prn at home, use PPI iv in hospital for now     Headache, dizziness, slightly abnormal renal functions  - suspicious for dehydration  -Treatment as above, and monitor closely  -Troponin normal  -improved       Thrombocytopenia (HCC)  -mild, initial platelets 147--> repeat in the morning ~139  -pt on ASA chronically     DM2, diet controlled--> Accu-Cheks and sliding scale  H/o CVA with residual right-sided weakness--> PT/OT  HTN--> Monitor  HL  Allergy to lisinopril  Social:  From home with family     DVT prophylaxis: Heparin subcu     Dispo: likely home tomorrow with family      MDM high  severe acute illness/or exacerbation of chronic illness posing a threat to life, IV medications, requiring close monitoring in hospital.         Results:            Lab Results   Component Value Date     WBC 5.0 06/23/2024     HGB 13.6 06/23/2024     HCT 39.2 06/23/2024     .0 (L) 06/23/2024     CREATSERUM 0.89 06/23/2024     BUN 8 (L) 06/23/2024      06/23/2024     K 3.7 06/23/2024      06/23/2024     CO2 26.0 06/23/2024      (H) 06/23/2024     CA 8.7 06/23/2024     ALB 4.1 06/23/2024     LIP 47 06/22/2024     MG 1.8 06/23/2024     PHOS 3.5 06/23/2024           EKG 12 Lead     Result Date: 6/23/2024  Sinus bradycardia with sinus arrhythmia Nonspecific T wave abnormality Abnormal ECG When compared with ECG of 25-APR-2023 00:43, MS interval has decreased      Vitals (last day) before discharge       Date/Time Temp Pulse Resp BP SpO2 Weight O2 Device O2 Flow Rate (L/min) Who    06/24/24 0908 98.1 °F (36.7 °C) 63 18 123/90 96 % -- None (Room air) -- YD    06/24/24 0614 98.4 °F (36.9 °C) 56 18 122/60 94 % -- None (Room air) -- RP    06/23/24 2153 98.1 °F (36.7 °C) 58 18 171/60 95 % -- None (Room air) -- RP    06/23/24 1900 -- 54 -- -- -- -- -- -- CY    06/23/24 1754 -- 65 18 150/58 95 % -- None (Room air) -- DUDLEY    06/23/24 1429 -- 55 -- -- -- -- -- -- RS    06/23/24 1413 98 °F (36.7 °C) 56 18 116/54 94 % -- None (Room air) -- DUDLEY    06/23/24 0857 97.9 °F (36.6 °C) 60 18 134/63 95 % -- None (Room air) -- DUDLEY    06/23/24 0517 98.1 °F (36.7 °C) 75 18 158/47 97 % -- None (Room air) -- RP

## 2024-06-28 NOTE — PAYOR COMM NOTE
--------------  ADMISSION REVIEW     Payor: [employer]  Subscriber #:  [id number]  Authorization Number: [id number]    Admit date: 6/22/24  Admit time:  4:18 PM       History   HPI  Patient presents emergency department complaining of abdominal and back pain.  The daughter acts as a  as they declined language line.  For the last 3 days she has had back and abdominal discomfort which seems to be diffuse in her low back.  She was seen at an emergency department in [facility] and diagnosed with a urinary tract infection 3 days ago but was unable to tolerate the Bactrim so was discontinued by the patient and her family.  There is no fever or vomiting.  There is no other aggravating or alleviating factors.  ED Triage Vitals [06/22/24 1210]   /45   Pulse (S) 56   Resp 22   Temp 97.9 °F (36.6 °C)   Temp src Temporal   SpO2 96 %   O2 Device None (Room air)     HENT:      Head: Normocephalic.      Nose: Nose normal.      Mouth/Throat:      Mouth: Mucous membranes are moist.   Eyes:      Conjunctiva/sclera: Conjunctivae normal.   Cardiovascular:      Rate and Rhythm: Normal rate and regular rhythm.      Heart sounds: No murmur heard.  Pulmonary:      Effort: Pulmonary effort is normal. No respiratory distress.      Breath sounds: Normal breath sounds.   Abdominal:      General: There is no distension.      Palpations: Abdomen is soft.      Tenderness: There is generalized abdominal tenderness. There is no guarding or rebound.   Musculoskeletal:         General: No tenderness. Normal range of motion.      Cervical back: Normal range of motion and neck supple.   Skin:     General: Skin is warm and dry.      Findings: No rash.   Neurological:      Mental Status: She is alert and oriented to person, place, and time.      Comments: Right-sided hemiplegia is present.     Labs Reviewed   BASIC METABOLIC PANEL (8) - Abnormal; Notable for the following components:       Result Value    Glucose  106 (*)     Creatinine 1.17 (*)     BUN/CREA Ratio 9.4 (*)     eGFR-Cr 46 (*)     All other components within normal limits   URINALYSIS WITH CULTURE REFLEX - Abnormal; Notable for the following components:    Clarity Urine Turbid (*)     Glucose Urine 30 (*)     Protein Urine 30 (*)     Urobilinogen Urine 8 (*)     Leukocyte Esterase Urine 250 (*)     WBC Urine 11-20 (*)     Squamous Epi. Cells Few (*)     Ca Oxalate Crystals Many (*)     Hyaline Casts Present (*)     All other components within normal limits   RENAL FUNCTION PANEL - Abnormal; Notable for the following components:    Glucose 116 (*)     BUN 8 (*)     BUN/CREA Ratio 9.0 (*)     All other components within normal limits   POCT GLUCOSE - Abnormal; Notable for the following components:    POC Glucose  156 (*)     All other components within normal limits   POCT GLUCOSE - Abnormal; Notable for the following components:    POC Glucose  164 (*)     All other components within normal limits   POCT GLUCOSE - Abnormal; Notable for the following components:    POC Glucose  129 (*)     All other components within normal limits   POCT GLUCOSE - Abnormal; Notable for the following components:    POC Glucose  256 (*)     All other components within normal limits   POCT GLUCOSE - Abnormal; Notable for the following components:    POC Glucose  154 (*)     All other components within normal limits   POCT GLUCOSE - Abnormal; Notable for the following components:    POC Glucose  110 (*)     All other components within normal limits   CBC W/ DIFFERENTIAL - Abnormal; Notable for the following components:    .0 (*)     All other components within normal limits   CBC W/ DIFFERENTIAL - Abnormal; Notable for the following components:    .0 (*)     Admission disposition: 6/22/2024  2:47 PM  Disposition and Plan   Clinical Impression:  1. Acute cystitis without hematuria    2. Abdominal pain of unknown etiology       Disposition:  Admit  6/22/2024  2:47  pm  Signed by James Hooks MD on 6/24/2024  7:11 AM    History & Physical     HPI:   Maria T Ferguson is a(n) 84 year old female with h/o diet-controlled diabetes, hypertension, CVA with right residual weakness, started having abdominal pains, flank and back pains associated with nausea and dizziness for few days, was seen at ER in [place] on 6/15, found to have UTI, prescribed Bactrim, that patient was taking, now back home feeling worse.  Also very poor appetite, myalgia, but no high fevers, no vomiting.   Blood pressure 143/84, pulse 59, temperature 97.6 °F (36.4 °C), temperature source Oral, resp. rate 16, height 4' 9\" (1.448 m), weight 110 lb 3.2 oz (50 kg), SpO2 96%, not currently breastfeeding.     General:  Alert and oriented.  NAD  Diffuse skin problem:  None.  Eye:  Pupils are equal, round and reactive to light, extraocular movements are intact, Normal conjunctiva.  HENT:  Normocephalic, oral mucosa is dry.  Head:  Normocephalic, atraumatic.  Neck:  Supple, non-tender  Respiratory:  Lungs are clear to auscultation, respirations are non-labored, breath sounds are equal, symmetrical chest wall expansion.  Cardiovascular:  Normal rate, regular rhythm, no murmur, no edema.  Gastrointestinal:  Soft, suprapubic tenderness, as well as bilateral flank tenderness, abd non-distended, normal bowel sounds, no organomegaly.  Lymphatics:  No lymphadenopathy neck, axilla, groin.  Musculoskeletal: Normal range of motion.no edema.  Feet:  Normal pulses.  Neurologic:  Alert, oriented, residual right-sided weakness.  Cognition and Speech:  Oriented, speech clear and coherent.  Psychiatric:  Cooperative, appropriate mood & affect.     Cervical Papanicolaou to be done in MD's office     Results:            Lab Results   Component Value Date     WBC 5.9 06/22/2024     HGB 13.1 06/22/2024     HCT 37.8 06/22/2024     .0 (L) 06/22/2024     CREATSERUM 1.17 (H) 06/22/2024     BUN 11 06/22/2024      06/22/2024     K  4.2 06/22/2024      06/22/2024     CO2 26.0 06/22/2024      (H) 06/22/2024     CA 9.7 06/22/2024     ALB 4.2 06/22/2024     ALKPHO 88 06/22/2024     BILT 0.7 06/22/2024     TP 6.7 06/22/2024     AST 31 06/22/2024     ALT 30 06/22/2024   CT ABDOMEN+PELVIS(CONTRAST ONLY)(CPT=74177)     Result Date: 6/22/2024  CONCLUSION:         1. No acute intra-abdominal process. 2. Lesser incidental findings as above.    Dictated by (CST): Ag Limon MD on 6/22/2024 at 2:38 PM     Finalized by (CST): Ag Limon MD on 6/22/2024 at 2:45 PM         EKG 12 Lead     Result Date: 6/22/2024  Sinus bradycardia with sinus arrhythmia Nonspecific T wave abnormality Abnormal ECG When compared with ECG of 25-APR-2023 00:43, UT interval has decreased      Assessment/Plan:       Acute cystitis without hematuria, possible pyelonephritis with bilateral flank pains  Abdominal pain probably from UTI  S/p Bactrim x 3 days with worsening symptoms  -Lactic acid 0.8 (WNL)  -Blood culture pending  -LFT and lipase normal  -UA abn, cx pending  -Try to obtain results of urine culture from outside hospital  -pt on famotidine prn at home, use PPI iv in hospital for now     Headache, dizziness, slightly abnormal renal functions  - suspicious for dehydration  -Treatment as above, and monitor closely  -Troponin normal       Thrombocytopenia (HCC)  -mild, initial platelets 147--> repeat in the morning     DM2, diet controlled--> Accu-Cheks and sliding scale  H/o CVA with residual right-sided weakness--> PT/OT  HTN--> Monitor  HL  Allergy to lisinopril

## 2024-07-08 ENCOUNTER — OFFICE VISIT (OUTPATIENT)
Dept: FAMILY MEDICINE CLINIC | Facility: CLINIC | Age: 84
End: 2024-07-08

## 2024-07-08 ENCOUNTER — LAB ENCOUNTER (OUTPATIENT)
Dept: LAB | Age: 84
End: 2024-07-08
Attending: FAMILY MEDICINE
Payer: MEDICARE

## 2024-07-08 VITALS
DIASTOLIC BLOOD PRESSURE: 78 MMHG | SYSTOLIC BLOOD PRESSURE: 166 MMHG | BODY MASS INDEX: 24.16 KG/M2 | HEIGHT: 57 IN | WEIGHT: 112 LBS | TEMPERATURE: 97 F | HEART RATE: 74 BPM

## 2024-07-08 DIAGNOSIS — Z12.11 COLON CANCER SCREENING: ICD-10-CM

## 2024-07-08 DIAGNOSIS — M15.9 PRIMARY OSTEOARTHRITIS INVOLVING MULTIPLE JOINTS: ICD-10-CM

## 2024-07-08 DIAGNOSIS — E11.9 TYPE 2 DIABETES MELLITUS WITHOUT RETINOPATHY (HCC): ICD-10-CM

## 2024-07-08 DIAGNOSIS — Z86.73 HISTORY OF CVA (CEREBROVASCULAR ACCIDENT): ICD-10-CM

## 2024-07-08 DIAGNOSIS — M54.9 BACK PAIN, UNSPECIFIED BACK LOCATION, UNSPECIFIED BACK PAIN LATERALITY, UNSPECIFIED CHRONICITY: ICD-10-CM

## 2024-07-08 DIAGNOSIS — R07.9 CHEST PAIN, UNSPECIFIED TYPE: ICD-10-CM

## 2024-07-08 DIAGNOSIS — E78.00 HYPERCHOLESTEREMIA: ICD-10-CM

## 2024-07-08 DIAGNOSIS — N28.9 DECREASED RENAL FUNCTION: ICD-10-CM

## 2024-07-08 DIAGNOSIS — N30.00 ACUTE CYSTITIS WITHOUT HEMATURIA: Primary | ICD-10-CM

## 2024-07-08 DIAGNOSIS — D69.6 THROMBOCYTOPENIA (HCC): ICD-10-CM

## 2024-07-08 DIAGNOSIS — R26.89 BALANCE DISORDER: ICD-10-CM

## 2024-07-08 DIAGNOSIS — F03.90 DEMENTIA WITHOUT BEHAVIORAL DISTURBANCE (HCC): ICD-10-CM

## 2024-07-08 DIAGNOSIS — I10 PRIMARY HYPERTENSION: ICD-10-CM

## 2024-07-08 DIAGNOSIS — R00.1 BRADYCARDIA: ICD-10-CM

## 2024-07-08 LAB
ALBUMIN SERPL-MCNC: 4.3 G/DL (ref 3.2–4.8)
ALBUMIN/GLOB SERPL: 1.7 {RATIO} (ref 1–2)
ALP LIVER SERPL-CCNC: 109 U/L
ALT SERPL-CCNC: 38 U/L
ANION GAP SERPL CALC-SCNC: 7 MMOL/L (ref 0–18)
APPEARANCE: CLEAR
AST SERPL-CCNC: 36 U/L (ref ?–34)
BASOPHILS # BLD AUTO: 0.04 X10(3) UL (ref 0–0.2)
BASOPHILS NFR BLD AUTO: 0.9 %
BILIRUB SERPL-MCNC: 0.7 MG/DL (ref 0.2–1.1)
BILIRUBIN: NEGATIVE
BUN BLD-MCNC: 11 MG/DL (ref 9–23)
BUN/CREAT SERPL: 11.8 (ref 10–20)
CALCIUM BLD-MCNC: 9.1 MG/DL (ref 8.7–10.4)
CHLORIDE SERPL-SCNC: 109 MMOL/L (ref 98–112)
CHOLEST SERPL-MCNC: 145 MG/DL (ref ?–200)
CO2 SERPL-SCNC: 29 MMOL/L (ref 21–32)
CREAT BLD-MCNC: 0.93 MG/DL
CREAT UR-SCNC: 24.2 MG/DL
DEPRECATED RDW RBC AUTO: 39.9 FL (ref 35.1–46.3)
EGFRCR SERPLBLD CKD-EPI 2021: 61 ML/MIN/1.73M2 (ref 60–?)
EOSINOPHIL # BLD AUTO: 0.24 X10(3) UL (ref 0–0.7)
EOSINOPHIL NFR BLD AUTO: 5.3 %
ERYTHROCYTE [DISTWIDTH] IN BLOOD BY AUTOMATED COUNT: 12.3 % (ref 11–15)
EST. AVERAGE GLUCOSE BLD GHB EST-MCNC: 143 MG/DL (ref 68–126)
FASTING PATIENT LIPID ANSWER: NO
FASTING STATUS PATIENT QL REPORTED: NO
GLOBULIN PLAS-MCNC: 2.6 G/DL (ref 2–3.5)
GLUCOSE (URINE DIPSTICK): 100 MG/DL
GLUCOSE BLD-MCNC: 179 MG/DL (ref 70–99)
HBA1C MFR BLD: 6.6 % (ref ?–5.7)
HCT VFR BLD AUTO: 38.8 %
HDLC SERPL-MCNC: 46 MG/DL (ref 40–59)
HGB BLD-MCNC: 13.6 G/DL
IMM GRANULOCYTES # BLD AUTO: 0.01 X10(3) UL (ref 0–1)
IMM GRANULOCYTES NFR BLD: 0.2 %
KETONES (URINE DIPSTICK): NEGATIVE MG/DL
LDLC SERPL CALC-MCNC: 63 MG/DL (ref ?–100)
LEUKOCYTES: NEGATIVE
LYMPHOCYTES # BLD AUTO: 1.02 X10(3) UL (ref 1–4)
LYMPHOCYTES NFR BLD AUTO: 22.6 %
MCH RBC QN AUTO: 31.1 PG (ref 26–34)
MCHC RBC AUTO-ENTMCNC: 35.1 G/DL (ref 31–37)
MCV RBC AUTO: 88.8 FL
MICROALBUMIN UR-MCNC: 0.3 MG/DL
MICROALBUMIN/CREAT 24H UR-RTO: 12.4 UG/MG (ref ?–30)
MONOCYTES # BLD AUTO: 0.34 X10(3) UL (ref 0.1–1)
MONOCYTES NFR BLD AUTO: 7.5 %
MULTISTIX LOT#: ABNORMAL NUMERIC
NEUTROPHILS # BLD AUTO: 2.87 X10 (3) UL (ref 1.5–7.7)
NEUTROPHILS # BLD AUTO: 2.87 X10(3) UL (ref 1.5–7.7)
NEUTROPHILS NFR BLD AUTO: 63.5 %
NITRITE, URINE: NEGATIVE
NONHDLC SERPL-MCNC: 99 MG/DL (ref ?–130)
OSMOLALITY SERPL CALC.SUM OF ELEC: 304 MOSM/KG (ref 275–295)
PH, URINE: 7.5 (ref 4.5–8)
PLATELET # BLD AUTO: 129 10(3)UL (ref 150–450)
PLATELETS.RETICULATED NFR BLD AUTO: 7.4 % (ref 0–7)
POTASSIUM SERPL-SCNC: 3.9 MMOL/L (ref 3.5–5.1)
PROT SERPL-MCNC: 6.9 G/DL (ref 5.7–8.2)
PROTEIN (URINE DIPSTICK): NEGATIVE MG/DL
RBC # BLD AUTO: 4.37 X10(6)UL
SODIUM SERPL-SCNC: 145 MMOL/L (ref 136–145)
SPECIFIC GRAVITY: 1.01 (ref 1–1.03)
TRIGL SERPL-MCNC: 225 MG/DL (ref 30–149)
URINE-COLOR: YELLOW
UROBILINOGEN,SEMI-QN: 0.2 MG/DL (ref 0–1.9)
VLDLC SERPL CALC-MCNC: 34 MG/DL (ref 0–30)
WBC # BLD AUTO: 4.5 X10(3) UL (ref 4–11)

## 2024-07-08 PROCEDURE — 83036 HEMOGLOBIN GLYCOSYLATED A1C: CPT | Performed by: FAMILY MEDICINE

## 2024-07-08 PROCEDURE — 36415 COLL VENOUS BLD VENIPUNCTURE: CPT | Performed by: FAMILY MEDICINE

## 2024-07-08 PROCEDURE — 80061 LIPID PANEL: CPT | Performed by: FAMILY MEDICINE

## 2024-07-08 PROCEDURE — 85025 COMPLETE CBC W/AUTO DIFF WBC: CPT | Performed by: FAMILY MEDICINE

## 2024-07-08 PROCEDURE — 80053 COMPREHEN METABOLIC PANEL: CPT | Performed by: FAMILY MEDICINE

## 2024-07-08 NOTE — PROGRESS NOTES
Subjective:   Maria T Ferguson is a 84 year old female who presents for hospital follow up.   She was discharged from [facility]  Admission Date: 6/22/24   Discharge Date: 6/24/24  Hospital Discharge Diagnosis: abdominal pain, acute cystitis without hematuria, bilateral flank pain. Headache, thrombocytopenia, abnormal renal function     Interactive contact within 2 business days post discharge first initiated on Date: 6/25/2024    During the visit, the following was completed:  Obtained and reviewed discharge summary, continuity of care documents, and Hospitalist notes  Reviewed Labs (CBC, CMP), Labs (Cardiac markers), CT radiology results, X-Ray radiology results, and EKG    HPI:     Patient was recently admitted at [facility] with the above complaints and discharged on June 26.  She was discharged home on antibiotics.  Blood cultures were negative x 2    She was noted to have a decreased kidney function as well as blood pressures were low as well as heart rate was low so she was taken off metoprolol.  Losartan was also adjusted    Urine culture grew more than 100,000 corynebacterium species not urealyticum    Patient was discharged home on cefdinir to be taken for 4 more days    Patient is here today with her daughter.  Daughter states she is overall doing better.  Still has some back pain.  No fever no chills.  She is trying to push more fluids.    She was taken back to the emergency room 2 days later after she complains of chest pain.  EKG and labs were normal.  This was felt to be noncardiac and inconsistent with acute coronary syndrome  D-dimer was negative    Her blood pressure is elevated today but daughter states that she was recently given news of a family death    Daughter is requesting prescription for walker as well as light wheelchair.  Patient has balance issues and needs support when walking.  She is high fall risk.    History/Other:   Current Medications:  Medication  Reconciliation:  I am aware of an inpatient discharge within the last 30 days.  The discharge medication list has been reconciled with the patient's current medication list and reviewed by me.  See medication list for additions of new medication, and changes to current doses of medications and discontinued medications.  Outpatient Medications Marked as Taking for the 7/8/24 encounter (Office Visit) with Rebecca Patel MD   Medication Sig    aspirin 325 MG Oral Tab Take 1 tablet (325 mg total) by mouth daily.    losartan 50 MG Oral Tab Take 1 tablet (50 mg total) by mouth daily. In the morning and half tablet (25 mg total) in the evening total of 75mg per day    sennosides 8.6 MG Oral Tab 1 tablet (8.6 mg total) by Per G Tube route daily as needed.    famotidine 20 MG Oral Tab Take 1 tablet (20 mg total) by mouth 2 (two) times daily as needed for Heartburn.    polyethylene glycol, PEG 3350, 17 g Oral Powd Pack Take 17 g by mouth daily as needed (constipation).    amLODIPine 5 MG Oral Tab Take 1 tablet (5 mg total) by mouth daily. (Patient taking differently: Take 1 tablet (5 mg total) by mouth at bedtime.)    atorvastatin 80 MG Oral Tab Take 1 tablet (80 mg total) by mouth nightly.    PARoxetine 10 MG Oral Tab Take 1 tablet (10 mg total) by mouth daily.    magnesium 250 MG Oral Tab Take 1 tablet (250 mg total) by mouth.    LORATADINE OR Take by mouth daily as needed.    Nutritional Supplements (GLUCERNA) Oral Liquid Take 1 Dose by mouth daily as needed.    Multiple Vitamin (MULTI-DAY VITAMINS OR) Take 1 tablet by mouth daily.    Cholecalciferol (VITAMIN D3) 3000 units Oral Tab Take 3,000 Units by mouth daily.         Review of Systems   Constitutional:  Negative for chills and fever.   Eyes:  Negative for visual disturbance.   Respiratory:  Negative for cough, shortness of breath and wheezing.    Cardiovascular:  Negative for chest pain, palpitations and leg swelling.   Genitourinary:  Negative for decreased urine  volume and difficulty urinating.   Neurological:  Negative for dizziness, tremors, seizures, weakness, light-headedness, numbness and headaches.        Negative except as above    Objective:   Physical Exam  Vitals and nursing note reviewed.   Constitutional:       Appearance: She is well-developed.      Comments: Smiling, she is a poor historian, has dementia    Eyes:      Pupils: Pupils are equal, round, and reactive to light.   Neck:      Thyroid: No thyromegaly.   Cardiovascular:      Rate and Rhythm: Normal rate and regular rhythm.      Heart sounds: No murmur heard.  Pulmonary:      Effort: Pulmonary effort is normal.      Breath sounds: Normal breath sounds. No wheezing.   Abdominal:      Palpations: There is no mass.      Tenderness: There is no abdominal tenderness. There is no right CVA tenderness or left CVA tenderness.   Musculoskeletal:      Cervical back: Normal range of motion and neck supple.      Right lower leg: No edema.      Left lower leg: No edema.      Comments: Patient appears to have pain her whole back, not just flanks/ cva    Skin:     General: Skin is warm and dry.      Findings: No rash.   Neurological:      Mental Status: She is alert and oriented to person, place, and time.   Psychiatric:         Attention and Perception: Attention normal.         Mood and Affect: Mood normal.         Speech: Speech is delayed.         Behavior: Behavior is slowed. Behavior is not agitated or aggressive. Behavior is cooperative.         Cognition and Memory: Cognition is impaired. Memory is impaired.         BP (!) 166/78   Pulse 74   Temp 96.7 °F (35.9 °C) (Temporal)   Ht 4' 9\" (1.448 m)   Wt 112 lb (50.8 kg)   BMI 24.24 kg/m²  Estimated body mass index is 24.24 kg/m² as calculated from the following:    Height as of this encounter: 4' 9\" (1.448 m).    Weight as of this encounter: 112 lb (50.8 kg).    Assessment & Plan:   1. Acute cystitis without hematuria (Primary)  2. Primary hypertension  3.  Bradycardia  4. Chest pain, unspecified type  5. History of CVA (cerebrovascular accident)  -     DME - External  -     DME - External  6. Dementia without behavioral disturbance (HCC)  -     DME - External  7. Primary osteoarthritis involving multiple joints  -     DME - External  -     DME - External  8. Balance disorder  -     DME - External  -     DME - External  9. Type 2 diabetes mellitus without retinopathy (HCC)  -     Hemoglobin A1C  -     Microalb/Creat Ratio, Random Urine  10. Decreased renal function  -     Comp Metabolic Panel (14)  11. Thrombocytopenia (HCC)  -     CBC With Differential With Platelet  12. Colon cancer screening  -     Occult Blood, Fecal, FIT Immunoassay  13. Hypercholesteremia  -     Lipid Panel  14. Back pain, unspecified back location, unspecified back pain laterality, unspecified chronicity  -     XR THORACIC SPINE (2 VIEWS) (CPT=72070); Future; Expected date: 07/08/2024  -     XR LUMBAR SPINE (MIN 2 VIEWS) (CPT=72100); Future; Expected date: 07/08/2024    1. Acute cystitis without hematuria  Treated  Recheck urine and culture     2. Primary hypertension  High today  Metoprolol was discontinued and losartan was decreased  Recheck bps at home  Follow up 1 month medicare and blood pressure     3. Bradycardia  HR good today    4. Chest pain, unspecified type  Resolved  Reviewed ER notes     5. History of CVA (cerebrovascular accident)  Patient has problems with balance and will benefit from a walker at home and wheelchair for transport to appointments etc   - DME - External  - DME - External    6. Dementia without behavioral disturbance (HCC)    - DME - External    7. Primary osteoarthritis involving multiple joints    - DME - External  - DME - External    8. Balance disorder    - DME - External  - DME - External    9. Type 2 diabetes mellitus without retinopathy (HCC)    - Hemoglobin A1C  - Microalb/Creat Ratio, Random Urine    10. Decreased renal function  Avoid nsaids  Check labs    - Comp Metabolic Panel (14)    11. Thrombocytopenia (HCC)    - CBC With Differential With Platelet    12. Colon cancer screening    - Occult Blood, Fecal, FIT Immunoassay    13. Hypercholesteremia    - Lipid Panel    14. Back pain, unspecified back location, unspecified back pain laterality, unspecified chronicity  Unclear  Check xrays   - XR THORACIC SPINE (2 VIEWS) (CPT=72070); Future  - XR LUMBAR SPINE (MIN 2 VIEWS) (CPT=72100); Future        Return in about 1 month (around 8/8/2024) for medicare wellness .

## 2024-07-08 NOTE — PROGRESS NOTES
Multiple attempts to reach pt and messages left with no return call.  Patient went in for HFU appt with PCP on 7/8/24.  Encounter closing.

## 2024-07-09 ENCOUNTER — HOSPITAL ENCOUNTER (EMERGENCY)
Facility: HOSPITAL | Age: 84
Discharge: HOME | End: 2024-07-09
Attending: EMERGENCY MEDICINE
Payer: MEDICARE

## 2024-07-09 ENCOUNTER — TELEPHONE (OUTPATIENT)
Dept: FAMILY MEDICINE CLINIC | Facility: CLINIC | Age: 84
End: 2024-07-09

## 2024-07-09 VITALS
HEART RATE: 79 BPM | BODY MASS INDEX: 24 KG/M2 | OXYGEN SATURATION: 95 % | TEMPERATURE: 98 F | SYSTOLIC BLOOD PRESSURE: 133 MMHG | DIASTOLIC BLOOD PRESSURE: 50 MMHG | WEIGHT: 112 LBS | RESPIRATION RATE: 20 BRPM

## 2024-07-09 DIAGNOSIS — U07.1 COVID-19: Primary | ICD-10-CM

## 2024-07-09 LAB
ALBUMIN SERPL-MCNC: 4.3 G/DL (ref 3.2–4.8)
ALBUMIN/GLOB SERPL: 1.7 {RATIO} (ref 1–2)
ALP LIVER SERPL-CCNC: 95 U/L
ALT SERPL-CCNC: 40 U/L
ANION GAP SERPL CALC-SCNC: 7 MMOL/L (ref 0–18)
AST SERPL-CCNC: 41 U/L (ref ?–34)
BASOPHILS # BLD AUTO: 0.01 X10(3) UL (ref 0–0.2)
BASOPHILS NFR BLD AUTO: 0.3 %
BILIRUB SERPL-MCNC: 0.9 MG/DL (ref 0.2–1.1)
BUN BLD-MCNC: 15 MG/DL (ref 9–23)
BUN/CREAT SERPL: 14.3 (ref 10–20)
CALCIUM BLD-MCNC: 9.2 MG/DL (ref 8.7–10.4)
CHLORIDE SERPL-SCNC: 107 MMOL/L (ref 98–112)
CO2 SERPL-SCNC: 24 MMOL/L (ref 21–32)
CREAT BLD-MCNC: 1.05 MG/DL
DEPRECATED RDW RBC AUTO: 39.9 FL (ref 35.1–46.3)
EGFRCR SERPLBLD CKD-EPI 2021: 53 ML/MIN/1.73M2 (ref 60–?)
EOSINOPHIL # BLD AUTO: 0.02 X10(3) UL (ref 0–0.7)
EOSINOPHIL NFR BLD AUTO: 0.6 %
ERYTHROCYTE [DISTWIDTH] IN BLOOD BY AUTOMATED COUNT: 12.4 % (ref 11–15)
FLUAV + FLUBV RNA SPEC NAA+PROBE: NEGATIVE
FLUAV + FLUBV RNA SPEC NAA+PROBE: NEGATIVE
GLOBULIN PLAS-MCNC: 2.6 G/DL (ref 2–3.5)
GLUCOSE BLD-MCNC: 124 MG/DL (ref 70–99)
HCT VFR BLD AUTO: 36.9 %
HGB BLD-MCNC: 13 G/DL
IMM GRANULOCYTES # BLD AUTO: 0.01 X10(3) UL (ref 0–1)
IMM GRANULOCYTES NFR BLD: 0.3 %
LYMPHOCYTES # BLD AUTO: 0.64 X10(3) UL (ref 1–4)
LYMPHOCYTES NFR BLD AUTO: 19.2 %
MCH RBC QN AUTO: 31.1 PG (ref 26–34)
MCHC RBC AUTO-ENTMCNC: 35.2 G/DL (ref 31–37)
MCV RBC AUTO: 88.3 FL
MONOCYTES # BLD AUTO: 0.57 X10(3) UL (ref 0.1–1)
MONOCYTES NFR BLD AUTO: 17.1 %
NEUTROPHILS # BLD AUTO: 2.08 X10 (3) UL (ref 1.5–7.7)
NEUTROPHILS # BLD AUTO: 2.08 X10(3) UL (ref 1.5–7.7)
NEUTROPHILS NFR BLD AUTO: 62.5 %
OSMOLALITY SERPL CALC.SUM OF ELEC: 288 MOSM/KG (ref 275–295)
PLATELET # BLD AUTO: 108 10(3)UL (ref 150–450)
PLATELETS.RETICULATED NFR BLD AUTO: 5.8 % (ref 0–7)
POTASSIUM SERPL-SCNC: 3.5 MMOL/L (ref 3.5–5.1)
PROT SERPL-MCNC: 6.9 G/DL (ref 5.7–8.2)
RBC # BLD AUTO: 4.18 X10(6)UL
RSV RNA SPEC NAA+PROBE: NEGATIVE
SARS-COV-2 RNA RESP QL NAA+PROBE: DETECTED
SODIUM SERPL-SCNC: 138 MMOL/L (ref 136–145)
WBC # BLD AUTO: 3.3 X10(3) UL (ref 4–11)

## 2024-07-09 PROCEDURE — 0241U SARS-COV-2/FLU A AND B/RSV BY PCR (GENEXPERT): CPT

## 2024-07-09 PROCEDURE — 80053 COMPREHEN METABOLIC PANEL: CPT | Performed by: EMERGENCY MEDICINE

## 2024-07-09 PROCEDURE — 0241U SARS-COV-2/FLU A AND B/RSV BY PCR (GENEXPERT): CPT | Performed by: EMERGENCY MEDICINE

## 2024-07-09 PROCEDURE — 99283 EMERGENCY DEPT VISIT LOW MDM: CPT

## 2024-07-09 PROCEDURE — 85025 COMPLETE CBC W/AUTO DIFF WBC: CPT

## 2024-07-09 PROCEDURE — 85025 COMPLETE CBC W/AUTO DIFF WBC: CPT | Performed by: EMERGENCY MEDICINE

## 2024-07-09 PROCEDURE — 36415 COLL VENOUS BLD VENIPUNCTURE: CPT

## 2024-07-09 PROCEDURE — 99284 EMERGENCY DEPT VISIT MOD MDM: CPT

## 2024-07-09 PROCEDURE — 80053 COMPREHEN METABOLIC PANEL: CPT

## 2024-07-09 RX ORDER — ACETAMINOPHEN 500 MG
1000 TABLET ORAL ONCE
Status: COMPLETED | OUTPATIENT
Start: 2024-07-09 | End: 2024-07-09

## 2024-07-10 NOTE — TELEPHONE ENCOUNTER
The patient's daughter called and reported that the patient tested positive for Covid today. The patient has severe diarrhea, body aches, and weakness. The patient's son is taking the patient to the ER.  ARTURO

## 2024-07-10 NOTE — ED INITIAL ASSESSMENT (HPI)
Pt here with son w/ complaints of muscle fatigue, difficulty walking overall weakness. Pt is normally ambulatory per son. Son did at home test of Covid which was +.

## 2024-07-10 NOTE — ED PROVIDER NOTES
Patient Seen in: [facility] Emergency Department    History     Chief Complaint   Patient presents with    Cough/URI       HPI    Patient presents to the ED complaining of muscle aches, fatigue, coughing and feeling sick.  Symptoms starting 2 days ago.  Home COVID test was positive.  No other complaints.    History reviewed.   Past Medical History:    Age-related nuclear cataract of both eyes    Anxiety state, unspecified    CVA (cerebral vascular accident) (HCC)    minor    Headache    High blood pressure    Hypercholesteremia    Hypertension    Syncope    2D echo ventriular wall thickening    Type II or unspecified type diabetes mellitus without mention of complication, not stated as uncontrolled    Unspecified essential hypertension       History reviewed.   Past Surgical History:   Procedure Laterality Date    Cataract extraction w/  intraocular lens implant Left 1/11/16    [facility]    Cataract extraction w/  intraocular lens implant Right 2/1/16    [facility]    Colonoscopy N/A 7/20/2017    Procedure: COLONOSCOPY;  Surgeon: Grayson Ortiz MD;  Location: [facility] ENDOSCOPY    Hysterectomy  1980    Tube insertion  11/2018    Yag capsulotomy - od - right eye Right 01/17/2018    [facility]    Yag capsulotomy - os - left eye Left 01/24/2018    [facility]         Medications :  (Not in a hospital admission)       Family History   Problem Relation Age of Onset    Diabetes Other     Hypertension Other     Lipids Other         hyperlipidemia    Breast Cancer Daughter 55    Glaucoma Neg     Macular degeneration Neg        Smoking Status:   Social History     Socioeconomic History    Marital status:    Tobacco Use    Smoking status: Never    Smokeless tobacco: Never   Vaping Use    Vaping status: Never Used   Substance and Sexual Activity    Alcohol use: No    Drug use: No   Other Topics Concern    Caffeine Concern Yes     Comment: coffee 1 cup    Exercise No    Pt has a pacemaker No    Pt has a defibrillator No    Reaction to local  anesthetic No       Constitutional and vital signs reviewed.      Social History and Family History elements reviewed from today, pertinent positives to the presenting problem noted.    Physical Exam     ED Triage Vitals [07/09/24 1913]   /68   Pulse 100   Resp 20   Temp (!) 101 °F (38.3 °C)   Temp src Oral   SpO2 95 %   O2 Device None (Room air)       All measures to prevent infection transmission during my interaction with the patient were taken. Handwashing was performed prior to and after the exam.  Stethoscope and any equipment used during my examination was cleaned with super sani-cloth germicidal wipes following the exam.     Physical Exam  Vitals and nursing note reviewed.   Constitutional:       General: She is not in acute distress.     Appearance: She is well-developed. She is not ill-appearing or toxic-appearing.   HENT:      Head: Normocephalic and atraumatic.   Eyes:      General:         Right eye: No discharge.         Left eye: No discharge.      Conjunctiva/sclera: Conjunctivae normal.   Neck:      Trachea: No tracheal deviation.   Cardiovascular:      Rate and Rhythm: Normal rate and regular rhythm.   Pulmonary:      Effort: Pulmonary effort is normal. No respiratory distress.      Breath sounds: Normal breath sounds. No stridor.   Abdominal:      General: There is no distension.      Palpations: Abdomen is soft.   Musculoskeletal:         General: No deformity.   Skin:     General: Skin is warm and dry.   Neurological:      Mental Status: She is alert and oriented to person, place, and time.   Psychiatric:         Mood and Affect: Mood normal.         Behavior: Behavior normal.         ED Course        Labs Reviewed   COMP METABOLIC PANEL (14) - Abnormal; Notable for the following components:       Result Value    Glucose 124 (*)     Creatinine 1.05 (*)     eGFR-Cr 53 (*)     AST 41 (*)     All other components within normal limits   SARS-COV-2/FLU A AND B/RSV BY PCR (GENEXPERT) - Abnormal;  Notable for the following components:    SARS-CoV-2 (COVID-19) - (GeneXpert) Detected (*)     All other components within normal limits    Narrative:     This test is intended for the qualitative detection and differentiation of SARS-CoV-2, influenza A, influenza B, and respiratory syncytial virus (RSV) viral RNA in nasopharyngeal or nares swabs from individuals suspected of respiratory viral infection consistent with COVID-19 by their healthcare provider. Signs and symptoms of respiratory viral infection due to SARS-CoV-2, influenza, and RSV can be similar.                                    Test performed using the Xpert Xpress SARS-CoV-2/FLU/RSV (real time RT-PCR)  assay on the GeneXpert instrument, [place], [place] [address].                   This test is being used under the Food and Drug Administration's Emergency Use Authorization.                                    The authorized Fact Sheet for Healthcare Providers for this assay is available upon request from the laboratory.   CBC W/ DIFFERENTIAL - Abnormal; Notable for the following components:    WBC 3.3 (*)     .0 (*)     Lymphocyte Absolute 0.64 (*)     All other components within normal limits   CBC WITH DIFFERENTIAL WITH PLATELET    Narrative:     The following orders were created for panel order CBC With Differential With Platelet.                  Procedure                               Abnormality         Status                                     ---------                               -----------         ------                                     CBC W/ DIFFERENTIAL[[id number]]          Abnormal            Final result                                                 Please view results for these tests on the individual orders.   RAINBOW DRAW LAVENDER   RAINBOW DRAW LIGHT GREEN   RAINBOW DRAW BLUE       As Interpreted by me    Imaging Results Available and Reviewed while in ED: No results found.  ED Medications Administered:   Medications    acetaminophen (Tylenol Extra Strength) tab 1,000 mg (1,000 mg Oral Given 7/9/24 1926)         MDM     Vitals:    07/09/24 1913 07/09/24 2245   BP: 114/68 133/50   Pulse: 100 79   Resp: 20 20   Temp: (!) 101 °F (38.3 °C) 98 °F (36.7 °C)   TempSrc: Oral Oral   SpO2: 95%    Weight: 50.8 kg      *I personally reviewed and interpreted all ED vitals.    Pulse Ox: 95%, Room air, Normal     Differential Diagnosis/ Diagnostic Considerations: COVID-19 infection, viral syndrome, other    Complicating Factors: The patient already has does not have any pertinent problems on file. to contribute to the complexity of this ED evaluation.    Medical Decision Making  The patient presents to the ED feeling weak and sick.  Nondistressed on examination.  COVID testing positive.  Patient would like to go home and son feels comfortable taking her home.  Will prescribe Paxlovid and recommended continued supportive care.    Problems Addressed:  COVID-19: acute illness or injury    Amount and/or Complexity of Data Reviewed  Independent Historian:      Details: The son provides history details and translates for the patient at her request  Labs: ordered. Decision-making details documented in ED Course.    Risk  Prescription drug management.        Condition upon leaving the department: Stable    Disposition and Plan     Clinical Impression:  1. COVID-19        Disposition:  Discharge    Follow-up:  Rebecca Patel MD  [address] [place], [place] [id number] [phone]    Schedule an appointment as soon as possible for a visit in 3 day(s)        Medications Prescribed:  Discharge Medication List as of 7/9/2024 11:45 PM        START taking these medications    Details   nirmatrelvir-ritonavir 300-100 MG Oral Tablet Therapy Pack Take one nirmatrelvir tablet (150mg) with one ritonavir tablet (100mg) together twice daily for 5 days.   (Nirmatrelvir dose is renally adjusted), Normal, Disp-30 tablet, R-0

## 2024-07-15 ENCOUNTER — PATIENT OUTREACH (OUTPATIENT)
Dept: CASE MANAGEMENT | Age: 84
End: 2024-07-15

## 2024-07-22 ENCOUNTER — HOSPITAL ENCOUNTER (OUTPATIENT)
Dept: GENERAL RADIOLOGY | Facility: HOSPITAL | Age: 84
Discharge: HOME | End: 2024-07-22
Attending: FAMILY MEDICINE
Payer: MEDICARE

## 2024-07-22 DIAGNOSIS — M54.9 BACK PAIN, UNSPECIFIED BACK LOCATION, UNSPECIFIED BACK PAIN LATERALITY, UNSPECIFIED CHRONICITY: ICD-10-CM

## 2024-07-22 PROCEDURE — 72072 X-RAY EXAM THORAC SPINE 3VWS: CPT | Performed by: FAMILY MEDICINE

## 2024-07-22 PROCEDURE — 72100 X-RAY EXAM L-S SPINE 2/3 VWS: CPT | Performed by: FAMILY MEDICINE

## 2024-09-03 ENCOUNTER — TELEPHONE (OUTPATIENT)
Dept: FAMILY MEDICINE CLINIC | Facility: CLINIC | Age: 84
End: 2024-09-03

## 2024-09-11 ENCOUNTER — PATIENT MESSAGE (OUTPATIENT)
Dept: FAMILY MEDICINE CLINIC | Facility: CLINIC | Age: 84
End: 2024-09-11

## 2024-09-12 NOTE — TELEPHONE ENCOUNTER
From: Maria T Ferguson  To: Rebecca Patel  Sent: 9/11/2024 1:32 PM CDT  Subject: Wheelchair     Hello,  [facility] the wheelchair prescription be sent to [facility] @ [phone]

## 2024-10-15 ENCOUNTER — TELEPHONE (OUTPATIENT)
Dept: FAMILY MEDICINE CLINIC | Facility: CLINIC | Age: 84
End: 2024-10-15

## 2024-10-15 ENCOUNTER — PATIENT MESSAGE (OUTPATIENT)
Dept: FAMILY MEDICINE CLINIC | Facility: CLINIC | Age: 84
End: 2024-10-15

## 2024-10-15 NOTE — TELEPHONE ENCOUNTER
Please addend 7/08/24 visit with wheelchair criteria & return to triage.  Will order via [facility]    Medical records include documentation of a face-to-face encounter between the beneficiary and the ordering practitioner that occurred within 6 months prior to completion of the order.    The records document that all of the following basic criteria are met:    The beneficiary has a mobility limitation that significantly impairs his/her ability to participate in one or more mobility-related activities of daily living such as toileting, feeding, dressing, grooming, and bathing in customary locations in the home; AND    The mobility limitation cannot be sufficiently resolved by the use of an appropriately fitted cane or walker; AND    Use of a manual wheelchair will significantly improve the beneficiary's ability to participate in mobility-related activities of daily living and the beneficiary will use it on a regular basis in the home; AND    The beneficiary has not expressed an unwillingness to use the manual wheelchair that is provided in the home; AND    The beneficiary has sufficient upper extremity function and other physical and mental capabilities needed to safely self-propel the manual wheelchair that is provided in the home during a typical day OR the beneficiary has a caregiver who is available, willing and able to provide assistance with the wheelchair        I know that a prescription was sent to [facility], but they are not able to assist me with the wheelchair. Can you please send it to [facility] @ [phone]

## 2024-10-15 NOTE — TELEPHONE ENCOUNTER
Pt states he takes several antipsychotic medications and they affect his memory sometimes and he accidentally took too much of his Olanzepine today as well as feels like he has had too much nicotine. Pt states he has had some flank pain in the past week on and off but no urinary symptoms.  At this time no flank pain.    Will send to [facility].    Please addend 7/08/24 visit with wheelchair criteria & return to triage.    Medical records include documentation of a face-to-face encounter between the beneficiary and the ordering practitioner that occurred within 6 months prior to completion of the order.    The records document that all of the following basic criteria are met:    The beneficiary has a mobility limitation that significantly impairs his/her ability to participate in one or more mobility-related activities of daily living such as toileting, feeding, dressing, grooming, and bathing in customary locations in the home; AND    The mobility limitation cannot be sufficiently resolved by the use of an appropriately fitted cane or walker; AND    Use of a manual wheelchair will significantly improve the beneficiary's ability to participate in mobility-related activities of daily living and the beneficiary will use it on a regular basis in the home; AND    The beneficiary has not expressed an unwillingness to use the manual wheelchair that is provided in the home; AND    The beneficiary has sufficient upper extremity function and other physical and mental capabilities needed to safely self-propel the manual wheelchair that is provided in the home during a typical day OR the beneficiary has a caregiver who is available, willing and able to provide assistance with the wheelchair        I know that a prescription was sent to [facility], but they are not able to assist me with the wheelchair. Can you please send it to [facility] @ [phone]      hair removal not indicated

## 2024-10-17 NOTE — ED AVS SNAPSHOT
Lore Guallpa   MRN: [id number]    Department:  [facility] Emergency Department   Date of Visit:  7/27/2018           Disclosure     Insurance plans vary and the physician(s) referred by the ER may not be covered by your plan.  Please contact yo within the next three months to obtain basic health screening including reassessment of your blood pressure.     IF THERE IS ANY CHANGE OR WORSENING OF YOUR CONDITION, CALL YOUR PRIMARY CARE PHYSICIAN AT ONCE OR RETURN IMMEDIATELY TO THE EMERGENCY DEPARTMEN 52 M h/o DVT/PE/IVC filter, sarcoid,  skin SLE, APLS on Coumadin, NSCLC last chemo 8/2024 sent in for kidney biopsy. Stopped Coumadin 2 days ago. Daily nausea c one episode of vomiting (yellow-brown) daily x 3-4 weeks. +BMs. Altered taste since chemo 8/2024. +good urine output.

## 2024-11-02 DIAGNOSIS — K21.9 GASTROESOPHAGEAL REFLUX DISEASE, UNSPECIFIED WHETHER ESOPHAGITIS PRESENT: ICD-10-CM

## 2024-11-02 DIAGNOSIS — E78.00 HYPERCHOLESTEREMIA: ICD-10-CM

## 2024-11-04 ENCOUNTER — LAB ENCOUNTER (OUTPATIENT)
Dept: LAB | Age: 84
End: 2024-11-04
Attending: FAMILY MEDICINE
Payer: MEDICARE

## 2024-11-04 ENCOUNTER — OFFICE VISIT (OUTPATIENT)
Dept: FAMILY MEDICINE CLINIC | Facility: CLINIC | Age: 84
End: 2024-11-04
Payer: COMMERCIAL

## 2024-11-04 VITALS
TEMPERATURE: 97 F | OXYGEN SATURATION: 97 % | BODY MASS INDEX: 23.73 KG/M2 | DIASTOLIC BLOOD PRESSURE: 64 MMHG | SYSTOLIC BLOOD PRESSURE: 104 MMHG | HEART RATE: 71 BPM | HEIGHT: 57 IN | WEIGHT: 110 LBS

## 2024-11-04 DIAGNOSIS — Z00.00 ENCOUNTER FOR ANNUAL HEALTH EXAMINATION: Primary | ICD-10-CM

## 2024-11-04 DIAGNOSIS — E78.00 HYPERCHOLESTEREMIA: ICD-10-CM

## 2024-11-04 DIAGNOSIS — Z23 NEED FOR INFLUENZA VACCINATION: ICD-10-CM

## 2024-11-04 DIAGNOSIS — H60.312 ACUTE DIFFUSE OTITIS EXTERNA OF LEFT EAR: ICD-10-CM

## 2024-11-04 DIAGNOSIS — M54.42 CHRONIC BILATERAL LOW BACK PAIN WITH BILATERAL SCIATICA: ICD-10-CM

## 2024-11-04 DIAGNOSIS — Z86.73 HISTORY OF CVA (CEREBROVASCULAR ACCIDENT): ICD-10-CM

## 2024-11-04 DIAGNOSIS — R10.10 UPPER ABDOMINAL PAIN: ICD-10-CM

## 2024-11-04 DIAGNOSIS — F33.0 MILD RECURRENT MAJOR DEPRESSION (HCC): ICD-10-CM

## 2024-11-04 DIAGNOSIS — M81.0 OSTEOPOROSIS, UNSPECIFIED OSTEOPOROSIS TYPE, UNSPECIFIED PATHOLOGICAL FRACTURE PRESENCE: ICD-10-CM

## 2024-11-04 DIAGNOSIS — M15.0 PRIMARY OSTEOARTHRITIS INVOLVING MULTIPLE JOINTS: ICD-10-CM

## 2024-11-04 DIAGNOSIS — M54.41 CHRONIC BILATERAL LOW BACK PAIN WITH BILATERAL SCIATICA: ICD-10-CM

## 2024-11-04 DIAGNOSIS — I10 ESSENTIAL HYPERTENSION WITH GOAL BLOOD PRESSURE LESS THAN 140/90: ICD-10-CM

## 2024-11-04 DIAGNOSIS — E11.9 TYPE 2 DIABETES MELLITUS WITHOUT RETINOPATHY (HCC): ICD-10-CM

## 2024-11-04 DIAGNOSIS — K21.9 GASTROESOPHAGEAL REFLUX DISEASE, UNSPECIFIED WHETHER ESOPHAGITIS PRESENT: ICD-10-CM

## 2024-11-04 DIAGNOSIS — I10 PRIMARY HYPERTENSION: ICD-10-CM

## 2024-11-04 DIAGNOSIS — F03.90 DEMENTIA WITHOUT BEHAVIORAL DISTURBANCE (HCC): ICD-10-CM

## 2024-11-04 DIAGNOSIS — G89.29 CHRONIC BILATERAL LOW BACK PAIN WITH BILATERAL SCIATICA: ICD-10-CM

## 2024-11-04 PROBLEM — D69.6 THROMBOCYTOPENIA (HCC): Status: RESOLVED | Noted: 2024-06-22 | Resolved: 2024-11-04

## 2024-11-04 PROBLEM — H43.391 FLOATER, VITREOUS, RIGHT: Status: RESOLVED | Noted: 2020-03-10 | Resolved: 2024-11-04

## 2024-11-04 PROBLEM — R10.9 ABDOMINAL PAIN OF UNKNOWN ETIOLOGY: Status: RESOLVED | Noted: 2024-06-22 | Resolved: 2024-11-04

## 2024-11-04 PROBLEM — R26.89 BALANCE DISORDER: Status: RESOLVED | Noted: 2024-07-08 | Resolved: 2024-11-04

## 2024-11-04 PROBLEM — N30.00 ACUTE CYSTITIS WITHOUT HEMATURIA: Status: RESOLVED | Noted: 2024-06-22 | Resolved: 2024-11-04

## 2024-11-04 PROBLEM — R73.9 HYPERGLYCEMIA: Status: RESOLVED | Noted: 2024-06-22 | Resolved: 2024-11-04

## 2024-11-04 PROBLEM — D69.6 THROMBOCYTOPENIA: Status: RESOLVED | Noted: 2024-06-22 | Resolved: 2024-11-04

## 2024-11-04 LAB
ALBUMIN SERPL-MCNC: 4.4 G/DL (ref 3.2–4.8)
ALBUMIN/GLOB SERPL: 1.8 {RATIO} (ref 1–2)
ALP LIVER SERPL-CCNC: 85 U/L
ALT SERPL-CCNC: 26 U/L
ANION GAP SERPL CALC-SCNC: 9 MMOL/L (ref 0–18)
AST SERPL-CCNC: 33 U/L (ref ?–34)
BASOPHILS # BLD AUTO: 0.02 X10(3) UL (ref 0–0.2)
BASOPHILS NFR BLD AUTO: 0.4 %
BILIRUB SERPL-MCNC: 0.5 MG/DL (ref 0.2–1.1)
BILIRUB UR QL: NEGATIVE
BUN BLD-MCNC: 11 MG/DL (ref 9–23)
BUN/CREAT SERPL: 10.5 (ref 10–20)
CALCIUM BLD-MCNC: 9.6 MG/DL (ref 8.7–10.4)
CHLORIDE SERPL-SCNC: 109 MMOL/L (ref 98–112)
CO2 SERPL-SCNC: 27 MMOL/L (ref 21–32)
COLOR UR: YELLOW
CREAT BLD-MCNC: 1.05 MG/DL
DEPRECATED RDW RBC AUTO: 39.9 FL (ref 35.1–46.3)
EGFRCR SERPLBLD CKD-EPI 2021: 53 ML/MIN/1.73M2 (ref 60–?)
EOSINOPHIL # BLD AUTO: 0.06 X10(3) UL (ref 0–0.7)
EOSINOPHIL NFR BLD AUTO: 1.2 %
ERYTHROCYTE [DISTWIDTH] IN BLOOD BY AUTOMATED COUNT: 12.6 % (ref 11–15)
EST. AVERAGE GLUCOSE BLD GHB EST-MCNC: 128 MG/DL (ref 68–126)
FASTING STATUS PATIENT QL REPORTED: NO
GLOBULIN PLAS-MCNC: 2.4 G/DL (ref 2–3.5)
GLUCOSE BLD-MCNC: 111 MG/DL (ref 70–99)
GLUCOSE UR-MCNC: NORMAL MG/DL
HBA1C MFR BLD: 6.1 % (ref ?–5.7)
HCT VFR BLD AUTO: 37.5 %
HGB BLD-MCNC: 13.1 G/DL
HGB UR QL STRIP.AUTO: NEGATIVE
IMM GRANULOCYTES # BLD AUTO: 0.01 X10(3) UL (ref 0–1)
IMM GRANULOCYTES NFR BLD: 0.2 %
KETONES UR-MCNC: NEGATIVE MG/DL
LEUKOCYTE ESTERASE UR QL STRIP.AUTO: 250
LYMPHOCYTES # BLD AUTO: 1.58 X10(3) UL (ref 1–4)
LYMPHOCYTES NFR BLD AUTO: 32.7 %
MCH RBC QN AUTO: 30.9 PG (ref 26–34)
MCHC RBC AUTO-ENTMCNC: 34.9 G/DL (ref 31–37)
MCV RBC AUTO: 88.4 FL
MONOCYTES # BLD AUTO: 0.39 X10(3) UL (ref 0.1–1)
MONOCYTES NFR BLD AUTO: 8.1 %
NEUTROPHILS # BLD AUTO: 2.77 X10 (3) UL (ref 1.5–7.7)
NEUTROPHILS # BLD AUTO: 2.77 X10(3) UL (ref 1.5–7.7)
NEUTROPHILS NFR BLD AUTO: 57.4 %
NITRITE UR QL STRIP.AUTO: NEGATIVE
OSMOLALITY SERPL CALC.SUM OF ELEC: 300 MOSM/KG (ref 275–295)
PH UR: 6.5 [PH] (ref 5–8)
PLATELET # BLD AUTO: 146 10(3)UL (ref 150–450)
POTASSIUM SERPL-SCNC: 3.8 MMOL/L (ref 3.5–5.1)
PROT SERPL-MCNC: 6.8 G/DL (ref 5.7–8.2)
PROT UR-MCNC: 30 MG/DL
RBC # BLD AUTO: 4.24 X10(6)UL
SODIUM SERPL-SCNC: 145 MMOL/L (ref 136–145)
SP GR UR STRIP: 1.02 (ref 1–1.03)
UROBILINOGEN UR STRIP-ACNC: NORMAL
WBC # BLD AUTO: 4.8 X10(3) UL (ref 4–11)

## 2024-11-04 PROCEDURE — 80053 COMPREHEN METABOLIC PANEL: CPT

## 2024-11-04 PROCEDURE — 83036 HEMOGLOBIN GLYCOSYLATED A1C: CPT

## 2024-11-04 PROCEDURE — 85025 COMPLETE CBC W/AUTO DIFF WBC: CPT

## 2024-11-04 PROCEDURE — 87086 URINE CULTURE/COLONY COUNT: CPT | Performed by: FAMILY MEDICINE

## 2024-11-04 PROCEDURE — 36415 COLL VENOUS BLD VENIPUNCTURE: CPT

## 2024-11-04 PROCEDURE — 81001 URINALYSIS AUTO W/SCOPE: CPT | Performed by: FAMILY MEDICINE

## 2024-11-04 RX ORDER — ATORVASTATIN CALCIUM 80 MG/1
80 TABLET, FILM COATED ORAL NIGHTLY
Qty: 90 TABLET | Refills: 3 | Status: SHIPPED | OUTPATIENT
Start: 2024-11-04

## 2024-11-04 RX ORDER — PAROXETINE 10 MG/1
10 TABLET, FILM COATED ORAL DAILY
Qty: 90 TABLET | Refills: 3 | Status: SHIPPED | OUTPATIENT
Start: 2024-11-04

## 2024-11-04 RX ORDER — ATORVASTATIN CALCIUM 80 MG/1
80 TABLET, FILM COATED ORAL NIGHTLY
Qty: 90 TABLET | Refills: 3 | Status: SHIPPED | OUTPATIENT
Start: 2024-11-04 | End: 2024-11-04

## 2024-11-04 RX ORDER — AMLODIPINE BESYLATE 5 MG/1
5 TABLET ORAL DAILY
Qty: 100 TABLET | Refills: 2 | OUTPATIENT
Start: 2024-11-04

## 2024-11-04 RX ORDER — NEOMYCIN SULFATE, POLYMYXIN B SULFATE AND HYDROCORTISONE 10; 3.5; 1 MG/ML; MG/ML; [USP'U]/ML
3 SUSPENSION/ DROPS AURICULAR (OTIC) 3 TIMES DAILY
Qty: 1 EACH | Refills: 0 | Status: SHIPPED | OUTPATIENT
Start: 2024-11-04 | End: 2024-11-04

## 2024-11-04 RX ORDER — ATORVASTATIN CALCIUM 80 MG/1
80 TABLET, FILM COATED ORAL NIGHTLY
Qty: 100 TABLET | Refills: 2 | OUTPATIENT
Start: 2024-11-04

## 2024-11-04 RX ORDER — AMLODIPINE BESYLATE 5 MG/1
5 TABLET ORAL NIGHTLY
Qty: 90 TABLET | Refills: 3 | Status: SHIPPED | OUTPATIENT
Start: 2024-11-04

## 2024-11-04 RX ORDER — NEOMYCIN SULFATE, POLYMYXIN B SULFATE AND HYDROCORTISONE 10; 3.5; 1 MG/ML; MG/ML; [USP'U]/ML
3 SUSPENSION/ DROPS AURICULAR (OTIC) 3 TIMES DAILY
Qty: 1 EACH | Refills: 0 | Status: SHIPPED | OUTPATIENT
Start: 2024-11-04 | End: 2024-11-11

## 2024-11-04 NOTE — PROGRESS NOTES
Subjective:   Maria T Ferguson is a 84 year old female who presents for a MA AHA (Medicare Advantage Annual Health Assessment) and Subsequent Annual Wellness visit (Pt already had Initial Annual Wellness) and scheduled follow up of multiple significant but stable problems.     Wt Readings from Last 6 Encounters:   11/04/24 110 lb (49.9 kg)   07/09/24 111 lb 15.9 oz (50.8 kg)   07/08/24 112 lb (50.8 kg)   06/26/24 110 lb (49.9 kg)   06/22/24 110 lb 3.2 oz (50 kg)   12/06/23 110 lb (49.9 kg)     BP Readings from Last 3 Encounters:   11/04/24 104/64   07/09/24 133/50   07/08/24 (!) 166/78     She lives by herself.  Daughter checks on her daily.  Poa daughter -  Sonia    No falls at home    History/Other:   Fall Risk Assessment:   She has been screened for Falls and is High Risk. Fall Prevention information provided to patient in After Visit Summary.    Do you feel unsteady when standing or walking?: (Patient-Rptd) Yes  Do you worry about falling?: (Patient-Rptd) Yes  Have you fallen in the past year?: (Patient-Rptd) Yes  How many times have you fallen?: (Patient-Rptd) (P) 1  Were you injured?: (Patient-Rptd) (P) Yes     Cognitive Assessment:   Abnormal  What day of the week is this?: Correct  What month is it?: Correct  What year is it?: Incorrect  Recall \"Ball\": Correct  Recall \"Flag\": Incorrect  Recall \"Tree\": Incorrect    Functional Ability/Status:   Maria T Ferguson has some abnormal functions as listed below:  She has Dressing and/or Bathing issues based on screening of functional status.  Difficulty dressing or bathing?: (Patient-Rptd) Yes  Bathing or Showering: (Patient-Rptd) Need some help  Dressing: (Patient-Rptd) Need some help  She has Toileting difficulties based on screening of functional status.She has Eating difficulties based on screening of functional status.She has Driving difficulties based on screening of functional status. She has Meal Preparation difficulties based on screening of functional status.She  has difficulties Managing Money/Bills based on screening of functional status.She has difficulties Shopping for Groceries based on screening of functional status. She has difficulties Taking Meds as Rx'd based on screening of functional status. She has Vision problems based on screening of functional status. She has Walking problems based on screening of functional status. She has problems with Daily Activities based on screening of functional status. She has problems with Memory based on screening of functional status.       Depression Screening (PHQ):  PHQ-2 SCORE: 0  , done 11/4/2024            Advanced Directives:   She does NOT have a Living Will. [Do you have a living will?: (Patient-Rptd) No]  She has a Power of  for Health Care on file in [employer].  Discussed Advance Care Planning with patient (and family/surrogate if present). Standard forms made available to patient in After Visit Summary.      Patient Active Problem List   Diagnosis    Hypercholesteremia    Essential hypertension with goal blood pressure less than 140/90    Type 2 diabetes mellitus without retinopathy (HCC)    Osteoporosis    Primary osteoarthritis involving multiple joints    History of CVA (cerebrovascular accident)    Chronic bilateral low back pain with bilateral sciatica    Dementia without behavioral disturbance (HCC)     Allergies:  She is allergic to lisinopril.    Current Medications:  Outpatient Medications Marked as Taking for the 11/4/24 encounter (Office Visit) with Rebecca Patel MD   Medication Sig    neomycin-polymyxin-hydrocortisone 3.5-55686-6 Otic Suspension Place 3 drops into the left ear 3 (three) times daily for 7 days.    amLODIPine 5 MG Oral Tab Take 1 tablet (5 mg total) by mouth at bedtime.    atorvastatin 80 MG Oral Tab Take 1 tablet (80 mg total) by mouth nightly.    PARoxetine 10 MG Oral Tab Take 1 tablet (10 mg total) by mouth daily.    aspirin 325 MG Oral Tab Take 1 tablet (325 mg total) by mouth daily.     losartan 50 MG Oral Tab Take 2 tablets (100 mg total) by mouth daily. Pt takes 50 mg in the morning and 50 in the evening    sennosides 8.6 MG Oral Tab 1 tablet (8.6 mg total) by Per G Tube route daily as needed.    famotidine 20 MG Oral Tab Take 1 tablet (20 mg total) by mouth 2 (two) times daily as needed for Heartburn.    polyethylene glycol, PEG 3350, 17 g Oral Powd Pack Take 17 g by mouth daily as needed (constipation).    magnesium 250 MG Oral Tab Take 1 tablet (250 mg total) by mouth.    LORATADINE OR Take by mouth daily as needed.    Nutritional Supplements (GLUCERNA) Oral Liquid Take 1 Dose by mouth daily as needed.    Multiple Vitamin (MULTI-DAY VITAMINS OR) Take 1 tablet by mouth daily.    Cholecalciferol (VITAMIN D3) 3000 units Oral Tab Take 3,000 Units by mouth daily.         Medical History:  She  has a past medical history of Age-related nuclear cataract of both eyes (11/12/2015), Anxiety state, unspecified, CVA (cerebral vascular accident) (HCC) (Jan 2015), Headache, High blood pressure, Hypercholesteremia, Hypertension, Syncope (2009), Type II or unspecified type diabetes mellitus without mention of complication, not stated as uncontrolled, and Unspecified essential hypertension.  Surgical History:  She  has a past surgical history that includes hysterectomy (1980); Cataract extraction w/  intraocular lens implant (Left, 1/11/16); Cataract extraction w/  intraocular lens implant (Right, 2/1/16); colonoscopy (N/A, 7/20/2017); Yag Capsulotomy - OD - Right Eye (Right, 01/17/2018); Yag Capsulotomy - OS - Left Eye (Left, 01/24/2018); and Tube insertion (11/2018).   Family History:  Her family history includes Breast Cancer (age of onset: 55) in her daughter; Diabetes in an other family member; Hypertension in an other family member; Lipids in an other family member.  Social History:  She  reports that she has never smoked. She has never used smokeless tobacco. She reports that she does not drink  alcohol and does not use drugs.    Tobacco:  She has never smoked tobacco.    CAGE Alcohol Screen:   CAGE screening score of 0 on 11/4/2024, showing low risk of alcohol abuse.      Patient Care Team:  Rebecca Patel MD as PCP - General (Family Medicine)  Austen Lewis DO as Consulting Physician (Physical Medicine)  Ezekiel Garces MD (NEUROLOGY)    Review of Systems     Negative except v    Objective:   Physical Exam  General Appearance:  Alert, cooperative, no distress, appears stated age   Head:  Normocephalic, without obvious abnormality, atraumatic   Eyes:  PERRL, conjunctiva/corneas clear, EOM's intact both eyes, left eye conjunctival hemorrhage medially    Ears:  Normal TM's and external ear canals, both ears, left ear canal inflammed    Nose: Nares normal, septum midline,mucosa normal, no drainage or sinus tenderness   Throat: Lips, mucosa, and tongue normal; teeth and gums normal   Neck: Supple, symmetrical, trachea midline, no adenopathy;  thyroid: not enlarged, symmetric, no tenderness/mass/nodules; no carotid bruit or JVD   Back:   Symmetric, no curvature, ROM normal, no CVA tenderness   Lungs:   Clear to auscultation bilaterally, respirations unlabored   Heart:  Regular rate and rhythm, S1 and S2 normal, no murmur, rub, or gallop   Abdomen:   Soft, non-tender, bowel sounds active all four quadrants,  no masses, no organomegaly   Pelvic: Deferred   Extremities: Extremities normal, atraumatic, no cyanosis or edema   Pulses: 2+ and symmetric   Skin: Skin color, texture, turgor normal, no rashes or lesions   Lymph nodes: Cervical, supraclavicular, and axillary nodes normal   Neurologic: Normal       /64   Pulse 71   Temp 97.3 °F (36.3 °C) (Temporal)   Ht 4' 9\" (1.448 m)   Wt 110 lb (49.9 kg)   SpO2 97%   BMI 23.80 kg/m²  Estimated body mass index is 23.8 kg/m² as calculated from the following:    Height as of this encounter: 4' 9\" (1.448 m).    Weight as of this encounter: 110 lb (49.9  kg).    Medicare Hearing Assessment:   Hearing Screening    Screening Method: Finger Rub  Finger Rub Result: Pass             Assessment & Plan:   Maria T Ferguson is a 84 year old female who presents for a Medicare Assessment.     1. Encounter for annual health examination (Primary)  2. Need for influenza vaccination  -     High Dose Fluzone trivalent influenza, 65yrs+ PFS (03195)  3. Hypercholesteremia  -     Discontinue: Atorvastatin Calcium; Take 1 tablet (80 mg total) by mouth nightly.  Dispense: 90 tablet; Refill: 3  -     Atorvastatin Calcium; Take 1 tablet (80 mg total) by mouth nightly.  Dispense: 90 tablet; Refill: 3  -     Detailed, Mod Complex (90678)  4. Gastroesophageal reflux disease, unspecified whether esophagitis present  5. Primary hypertension  -     Detailed, Mod Complex (77922)  6. History of CVA (cerebrovascular accident)  7. Dementia without behavioral disturbance (HCC)  -     Urinalysis with Culture Reflex  8. Essential hypertension with goal blood pressure less than 140/90  -     Comp Metabolic Panel (14); Future; Expected date: 11/04/2024  9. Type 2 diabetes mellitus without retinopathy (HCC)  -     Hemoglobin A1C; Future; Expected date: 11/04/2024  10. Upper abdominal pain  -     Urinalysis with Culture Reflex  -     CBC With Differential With Platelet; Future; Expected date: 11/04/2024  -     H. Pylori Stool Ag, EIA; Future; Expected date: 11/04/2024  -     Detailed, Mod Complex (47889)  11. Acute diffuse otitis externa of left ear  -     Discontinue: Neomycin-Polymyxin-HC; Place 3 drops into the left ear 3 (three) times daily for 7 days.  Dispense: 1 each; Refill: 0  -     Neomycin-Polymyxin-HC; Place 3 drops into the left ear 3 (three) times daily for 7 days.  Dispense: 1 each; Refill: 0  -     Detailed, Mod Complex (89192)  12. Osteoporosis, unspecified osteoporosis type, unspecified pathological fracture presence  -     ENDOCRINOLOGY - INTERNAL  13. Primary osteoarthritis involving  multiple joints  14. 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 Mild recurrent major depression (HCC)  -     PARoxetine HCl; Take 1 tablet (10 mg total) by mouth daily.  Dispense: 90 tablet; Refill: 3  Other orders  -     amLODIPine Besylate; Take 1 tablet (5 mg total) by mouth at bedtime.  Dispense: 90 tablet; Refill: 3    1. Encounter for annual health examination      2. Need for influenza vaccination    - High Dose Fluzone trivalent influenza, 65yrs+ PFS (45434)    3. Hypercholesteremia  Controlled  Continue present management     4. Gastroesophageal reflux disease, unspecified whether esophagitis present  Stable    5. Primary hypertension  Stable condition  Reviewed medications  Continue current medication management   All questions answered to the best of my ability.      6. History of CVA (cerebrovascular accident)      7. Dementia without behavioral disturbance (HCC)  stable  - Urinalysis with Culture Reflex    8. Essential hypertension with goal blood pressure less than 140/90  Stable condition  Reviewed medications  Continue current medication management   All questions answered to the best of my ability.    - Comp Metabolic Panel (14); Future    9. Type 2 diabetes mellitus without retinopathy (HCC)  Well controlled  - Hemoglobin A1C; Future    10. Upper abdominal pain  Hx of h pylori  Per daughter she has similar symptoms in past   - Urinalysis with Culture Reflex  - CBC With Differential With Platelet; Future  - H. Pylori Stool Ag, EIA; Future    11. Acute diffuse otitis externa of left ear  Keep ear dry  Take medications as directed. Side effects/ risks discussed and patient verbalized understanding of plan. To follow up if symptoms worsen.    - neomycin-polymyxin-hydrocortisone 3.5-08620-4 Otic Suspension; Place 3 drops into the left ear 3 (three) times daily for 7 days.  Dispense: 1 each; Refill: 0    12. Osteoporosis, unspecified osteoporosis type, unspecified pathological fracture  presence  Follow up endo    - ENDOCRINOLOGY - INTERNAL    13. Primary osteoarthritis involving multiple joints  Tylenol as needed     14. Chronic bilateral low back pain with bilateral sciatica  Stable   The patient indicates understanding of these issues and agrees to the plan.  Continue with current treatment plan.  Lab work ordered.  Prescription medication ordered.  Reinforced healthy diet, lifestyle, and exercise.      Return in 6 months (on 5/4/2025).     Rebecca Patel MD, 11/4/2024     Supplementary Documentation:   General Health:  In the past six months, have you lost more than 10 pounds without trying?: (Patient-Rptd) 2 - No  Has your appetite been poor?: (Patient-Rptd) No  Type of Diet: (Patient-Rptd) Balanced;Diabetic  How does the patient maintain a good energy level?: (Patient-Rptd) Appropriate Exercise  How would you describe your daily physical activity?: (Patient-Rptd) Light  How would you describe your current health state?: (Patient-Rptd) Fair  How do you maintain positive mental well-being?: (Patient-Rptd) Visiting Family  On a scale of 0 to 10, with 0 being no pain and 10 being severe pain, what is your pain level?: (Patient-Rptd) 5 - (Moderate)  In the past six months, have you experienced urine leakage?: (Patient-Rptd) 1-Yes  At any time do you feel concerned for the safety/well-being of yourself and/or your children, in your home or elsewhere?: (Patient-Rptd) No  Have you had any immunizations at another office such as Influenza, Hepatitis B, Tetanus, or Pneumococcal?: (Patient-Rptd) No    Health Maintenance   Topic Date Due    Zoster Vaccines (2 of 3) 02/02/2016    Colorectal Cancer Screening  01/21/2023    MA Annual Health Assessment  01/01/2024    HTN: BP Follow-Up  08/08/2024    COVID-19 Vaccine (4 - 2023-24 season) 09/01/2024    Influenza Vaccine (1) 10/01/2024    Diabetes Care Foot Exam  12/06/2024    Diabetes Care A1C  01/08/2025    Diabetes Care Dilated Eye Exam  05/28/2025     Diabetes Care: Microalb/Creat Ratio  07/08/2025    Diabetes Care: GFR  07/09/2025    DEXA Scan  Completed    Annual Depression Screening  Completed    Fall Risk Screening (Annual)  Completed    Pneumococcal Vaccine: 65+ Years  Completed

## 2024-11-05 DIAGNOSIS — R31.21 ASYMPTOMATIC MICROSCOPIC HEMATURIA: Primary | ICD-10-CM

## 2024-11-06 RX ORDER — FAMOTIDINE 20 MG/1
20 TABLET, FILM COATED ORAL DAILY
Qty: 90 TABLET | Refills: 3 | Status: SHIPPED | OUTPATIENT
Start: 2024-11-06

## 2024-11-06 RX ORDER — LOSARTAN POTASSIUM 50 MG/1
50 TABLET ORAL 2 TIMES DAILY
Qty: 180 TABLET | Refills: 3 | Status: SHIPPED | OUTPATIENT
Start: 2024-11-06

## 2024-11-06 NOTE — TELEPHONE ENCOUNTER
Refill passed per [facility] protocol.    Medications  listed as patient reported.    Requested Prescriptions   Pending Prescriptions Disp Refills    LOSARTAN 50 MG Oral Tab [Pharmacy Med Name: Losartan Potassium 50 MG Oral Tablet] 200 tablet 2     Sig: TAKE 1 TABLET BY MOUTH TWICE  DAILY       Hypertension Medications Protocol Passed - 11/6/2024 10:10 AM        Passed - CMP or BMP in past 12 months        Passed - Last BP reading less than 140/90     BP Readings from Last 1 Encounters:   11/04/24 104/64               Passed - In person appointment or virtual visit in the past 12 mos or appointment in next 3 mos     Recent Outpatient Visits              2 days ago [facility]    4 months ago Acute cystit[facility] hematuria    [facility]    [facility]    5 months ago Type 2 diabetes mellitus without retinopathy (HCC)    [facility] Armando Bee MD    Office Visit    11 months ago [facility]counter for annual health examination    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)    [facility]    Office Visit                      Passed - EGFRCR or GFRNAA > 50     GFR Evaluation  EGFRCR: 53 , resulted on 11/4/2024            FAMOTIDINE 20 MG Oral Tab [Pharmacy Med Name: Famotidine 20 MG Oral Tablet] 100 tablet 2     Sig: TAKE 1 TABLET BY MOUTH DAILY       Gastrointestional Medication Protocol Passed - 11/6/2024 10:10 AM        Passed - In person appointment or virtual visit in the past 12 mos or appointment in next 3 mos     Recent Outpatient Visits              2 days ago [facility] for annual health  [facility]    [facility]    4 months ago [facility] hematuria    [facility]    [facility]    5 months ago Type 2 diabetes mellitus without retinopathy (HCC)    [facility] Armando Bee MD    Office Visit    11 months ago Encounter for annual health examination    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)    [facility] Ezekiel Garces MD    Office Visit                       Refused Prescriptions Disp Refills    ATORVASTATIN 80 MG Oral Tab [Pharmacy Med Name: Atorvastatin Calcium 80 MG Oral Tablet] 100 tablet 2     Sig: TAKE 1 TABLET BY MOUTH EVERY  NIGHT       Cholesterol Medication Protocol Passed - 11/6/2024 10:10 AM        Passed - ALT < 80     Lab Results   Component Value Date    ALT 26 11/04/2024             Passed - ALT resulted within past year        Passed - Lipid panel within past 12 months     Lab Results   Component Value Date    CHOLEST 145 07/08/2024    TRIG 225 (H) 07/08/2024    HDL 46 07/08/2024    LDL 63 07/08/2024    VLDL 34 (H) 07/08/2024    NONHDLC 99 07/08/2024             Passed - In person appointment or virtual visit in the past 12 mos or appointment in next 3 mos     Recent Outpatient Visits              2 days ago [facility] for annual health examination    [facility]    [facility]    4 months ago Acute cystitis without hematuria    [facility]    [facility] Visit    5 months ago Type 2 diabetes mellitus without retinopathy (HCC)    [facility]    Office Visit    11 months ago Encounter for annual health examination    [facility] Rebecca Hopkins MD    Office Visit    1 year ago 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)    [facility]    [facility]                        AMLODIPINE 5 MG Oral Tab [Pharmacy Med Name: amLODIPine Besylate 5 MG Oral Tablet] 100 tablet 2     Sig: TAKE 1 TABLET BY MOUTH DAILY       Hypertension Medications Protocol Passed - 11/6/2024 10:10 AM        Passed - CMP or BMP in past 12 months        Passed - Last BP reading less than 140/90     BP Readings from Last 1 Encounters:   11/04/24 104/64               Passed - In person appointment or virtual visit in the past 12 mos or appointment in next 3 mos     Recent Outpatient Visits              2 days ago [facility]    [facility]    4 months ago [facility]stit[facility] without hematuria    [facility]    [facility]    5 months ago Type 2 diabetes mellitus without retinopathy (HCC)    [facility]    Office Visit    11 months ago [facility] for annual health examination    [facility]    [facility] Visit    1 year ago 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)    [facility]    [facility]                      Passed - EGFRCR or GFRNAA > 50     GFR Evaluation  EGFRCR: 53 , resulted on 11/4/2024             Recent Outpatient Visits              2 days ago Encounter for annual health examination    [facility]  Rebecca Hopkins MD    Office Visit    4 months ago Acute cystitis without hematuria    [facility] Rebecca Hopkins MD    Office Visit    5 months ago Type 2 diabetes mellitus without retinopathy (HCC)    [facility] Armando Bee MD    Office Visit    11 months ago Encounter for annual health examination    [facility] [place] Rebecca Patel MD    Office Visit    1 year ago 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)    [facility] [place] Ezekiel Garces MD    Office Visit

## 2024-11-18 ENCOUNTER — APPOINTMENT (OUTPATIENT)
Dept: LAB | Facility: HOSPITAL | Age: 84
End: 2024-11-18
Attending: FAMILY MEDICINE
Payer: MEDICARE

## 2024-11-18 PROCEDURE — 87338 HPYLORI STOOL AG IA: CPT

## 2024-11-19 ENCOUNTER — LAB ENCOUNTER (OUTPATIENT)
Dept: LAB | Facility: HOSPITAL | Age: 84
End: 2024-11-19
Attending: FAMILY MEDICINE
Payer: MEDICARE

## 2024-11-19 DIAGNOSIS — R10.10 UPPER ABDOMINAL PAIN: ICD-10-CM

## 2024-11-21 LAB — H PYLORI AG STL QL IA: NEGATIVE

## 2025-04-25 ENCOUNTER — TELEPHONE (OUTPATIENT)
Dept: FAMILY MEDICINE CLINIC | Facility: CLINIC | Age: 85
End: 2025-04-25

## 2025-04-25 ENCOUNTER — OFFICE VISIT (OUTPATIENT)
Dept: FAMILY MEDICINE CLINIC | Facility: CLINIC | Age: 85
End: 2025-04-25

## 2025-04-25 VITALS
SYSTOLIC BLOOD PRESSURE: 126 MMHG | HEART RATE: 90 BPM | WEIGHT: 104 LBS | BODY MASS INDEX: 22.44 KG/M2 | DIASTOLIC BLOOD PRESSURE: 71 MMHG | HEIGHT: 57 IN | OXYGEN SATURATION: 97 %

## 2025-04-25 DIAGNOSIS — M54.41 CHRONIC BILATERAL LOW BACK PAIN WITH BILATERAL SCIATICA: Primary | ICD-10-CM

## 2025-04-25 DIAGNOSIS — M54.42 CHRONIC BILATERAL LOW BACK PAIN WITH BILATERAL SCIATICA: Primary | ICD-10-CM

## 2025-04-25 DIAGNOSIS — M15.0 PRIMARY OSTEOARTHRITIS INVOLVING MULTIPLE JOINTS: ICD-10-CM

## 2025-04-25 DIAGNOSIS — G89.29 CHRONIC BILATERAL LOW BACK PAIN WITH BILATERAL SCIATICA: Primary | ICD-10-CM

## 2025-04-25 PROCEDURE — 3074F SYST BP LT 130 MM HG: CPT

## 2025-04-25 PROCEDURE — 3008F BODY MASS INDEX DOCD: CPT

## 2025-04-25 PROCEDURE — 3078F DIAST BP <80 MM HG: CPT

## 2025-04-25 PROCEDURE — 1160F RVW MEDS BY RX/DR IN RCRD: CPT

## 2025-04-25 PROCEDURE — 1159F MED LIST DOCD IN RCRD: CPT

## 2025-04-25 PROCEDURE — 1125F AMNT PAIN NOTED PAIN PRSNT: CPT

## 2025-04-25 PROCEDURE — 99214 OFFICE O/P EST MOD 30 MIN: CPT

## 2025-04-25 RX ORDER — CYCLOBENZAPRINE HCL 10 MG
10 TABLET ORAL 2 TIMES DAILY
Qty: 15 TABLET | Refills: 0 | Status: SHIPPED | OUTPATIENT
Start: 2025-04-25

## 2025-04-25 RX ORDER — ACETAMINOPHEN AND CODEINE PHOSPHATE 300; 30 MG/1; MG/1
1 TABLET ORAL EVERY 6 HOURS PRN
Qty: 15 TABLET | Refills: 0 | Status: SHIPPED | OUTPATIENT
Start: 2025-04-25

## 2025-04-25 NOTE — PROGRESS NOTES
Subjective:   Maria T Ferguson is a 84 year old female who presents for Back Pain (Pt has been having lower back pain due to osteoporosis and is undergoing a lot of stress due to a loss of son and is having sever back pain. ). Patient is here today with Daughter.    HPI   Patient presents for evaluation of low back problems. She has a past medical history of  Diabetes, dementia, htn, osteoporosis, history of CVA, and chronic bilateral lower back pain with bilateral sciatica. Symptoms have been present for several weeks and include pain in lower back (sharp, stabbing, and throbbing in character; 10/10 in severity).  Symptoms are worse in the middle of the day and at nighttime. Alleviating factors identifiable by the patient are none. Aggravating factors identifiable by the patient are bending backwards, bending forwards, bending sideways, increased intrathoracic pressure (cough, sneeze, etc.), recumbency, running, sitting, standing, walking, and walking uphill. Patient has apt with orthopedics on 04/28/25 scheduled. Patient states that her son passed away yesterday and has been increased pain since then.       History/Other:      Chief Complaint Reviewed and Verified  Nursing Notes Reviewed and   Verified  Tobacco Reviewed  Allergies Reviewed  Medications Reviewed    Problem List Reviewed  Medical History Reviewed  Surgical History   Reviewed  OB Status Reviewed  Family History Reviewed  Social History   Reviewed           Tobacco:  She has never smoked tobacco.      Current Medications[1]    Allergies[2]        Review of Systems   Constitutional: Negative.  Negative for activity change and fatigue.   HENT: Negative.  Negative for congestion, ear pain, rhinorrhea and sneezing.    Eyes: Negative.  Negative for redness.   Respiratory: Negative.  Negative for cough, shortness of breath and wheezing.    Cardiovascular: Negative.  Negative for chest pain.   Gastrointestinal: Negative.  Negative for abdominal pain,  constipation, diarrhea, nausea and vomiting.   Endocrine: Negative.    Genitourinary: Negative.  Negative for difficulty urinating and frequency.   Musculoskeletal:  Positive for back pain and myalgias. Negative for joint swelling.   Skin: Negative.  Negative for rash.   Allergic/Immunologic: Negative.    Neurological: Negative.  Negative for dizziness, syncope, light-headedness and headaches.   Hematological: Negative.    Psychiatric/Behavioral:  Positive for dysphoric mood. The patient is not nervous/anxious.          Objective:   /71 (BP Location: Right arm, Patient Position: Sitting, Cuff Size: adult)   Pulse 90   Ht 4' 9\" (1.448 m)   Wt 104 lb (47.2 kg)   SpO2 97%   BMI 22.51 kg/m²  Estimated body mass index is 22.51 kg/m² as calculated from the following:    Height as of this encounter: 4' 9\" (1.448 m).    Weight as of this encounter: 104 lb (47.2 kg).      Physical Exam  Vitals and nursing note reviewed.   Constitutional:       Appearance: Normal appearance. She is normal weight.   HENT:      Head: Normocephalic and atraumatic.      Right Ear: Tympanic membrane normal.      Left Ear: Tympanic membrane normal.      Nose: Nose normal.      Mouth/Throat:      Mouth: Mucous membranes are moist.      Pharynx: Oropharynx is clear.   Eyes:      Extraocular Movements: Extraocular movements intact.      Conjunctiva/sclera: Conjunctivae normal.      Pupils: Pupils are equal, round, and reactive to light.   Cardiovascular:      Rate and Rhythm: Regular rhythm.      Pulses: Normal pulses.      Heart sounds: Normal heart sounds.   Pulmonary:      Effort: Pulmonary effort is normal.      Breath sounds: Normal breath sounds.   Abdominal:      General: Abdomen is flat. Bowel sounds are normal.      Palpations: Abdomen is soft.   Musculoskeletal:      Cervical back: Normal range of motion and neck supple.      Lumbar back: Spasms and tenderness present. Decreased range of motion.   Skin:     General: Skin is warm  and dry.   Neurological:      General: No focal deficit present.      Mental Status: She is alert and oriented to person, place, and time. Mental status is at baseline.   Psychiatric:         Mood and Affect: Mood is depressed. Affect is tearful.         Behavior: Behavior normal.         Thought Content: Thought content normal.         Judgment: Judgment normal.           Assessment & Plan:     Assessment & Plan  Chronic bilateral low back pain with bilateral sciatica  -Advised to take medication as directed.   -Dicussed about heat/ice, gentle stretching and exercises    Orders:    cyclobenzaprine 10 MG Oral Tab; Take 1 tablet (10 mg total) by mouth in the morning and 1 tablet (10 mg total) before bedtime. As needed.    acetaminophen-codeine 300-30 MG Oral Tab; Take 1 tablet by mouth every 6 (six) hours as needed for Pain. Medication may causes sedation, constipation. Not to operate heavy machinery or drive on medication.    Primary osteoarthritis involving multiple joints    Orders:    cyclobenzaprine 10 MG Oral Tab; Take 1 tablet (10 mg total) by mouth in the morning and 1 tablet (10 mg total) before bedtime. As needed.    acetaminophen-codeine 300-30 MG Oral Tab; Take 1 tablet by mouth every 6 (six) hours as needed for Pain. Medication may causes sedation, constipation. Not to operate heavy machinery or drive on medication.       Medication use, effects and side effects discussed in detail with patient. The patient and Daughter indicated understanding of the diagnosis and agreed with the plan of care.    Return if symptoms worsen or fail to improve.    IVONE Dennis         [1]   Current Outpatient Medications   Medication Sig Dispense Refill    cyclobenzaprine 10 MG Oral Tab Take 1 tablet (10 mg total) by mouth in the morning and 1 tablet (10 mg total) before bedtime. As needed. 15 tablet 0    acetaminophen-codeine 300-30 MG Oral Tab Take 1 tablet by mouth every 6 (six) hours as needed for Pain.  Medication may causes sedation, constipation. Not to operate heavy machinery or drive on medication. 15 tablet 0    diazePAM 2 MG Oral Tab Take 1 tablet (2 mg total) by mouth every 12 (twelve) hours as needed for Anxiety. 10 tablet 0    losartan 50 MG Oral Tab Take 1 tablet (50 mg total) by mouth 2 (two) times daily. 180 tablet 3    famotidine 20 MG Oral Tab Take 1 tablet (20 mg total) by mouth daily. 90 tablet 3    amLODIPine 5 MG Oral Tab Take 1 tablet (5 mg total) by mouth at bedtime. 90 tablet 3    atorvastatin 80 MG Oral Tab Take 1 tablet (80 mg total) by mouth nightly. 90 tablet 3    PARoxetine 10 MG Oral Tab Take 1 tablet (10 mg total) by mouth daily. 90 tablet 3    aspirin 325 MG Oral Tab Take 1 tablet (325 mg total) by mouth daily.      sennosides 8.6 MG Oral Tab 1 tablet (8.6 mg total) by Per G Tube route daily as needed.      magnesium 250 MG Oral Tab Take 1 tablet (250 mg total) by mouth.      LORATADINE OR Take by mouth daily as needed.      Nutritional Supplements (GLUCERNA) Oral Liquid Take 1 Dose by mouth daily as needed. 90 Bottle 1    Multiple Vitamin (MULTI-DAY VITAMINS OR) Take 1 tablet by mouth daily.      Cholecalciferol (VITAMIN D3) 3000 units Oral Tab Take 3,000 Units by mouth daily.       [2]   Allergies  Allergen Reactions    Lisinopril Coughing

## 2025-04-25 NOTE — ASSESSMENT & PLAN NOTE
-Advised to take medication as directed.   -Dicussed about heat/ice, gentle stretching and exercises    Orders:    cyclobenzaprine 10 MG Oral Tab; Take 1 tablet (10 mg total) by mouth in the morning and 1 tablet (10 mg total) before bedtime. As needed.    acetaminophen-codeine 300-30 MG Oral Tab; Take 1 tablet by mouth every 6 (six) hours as needed for Pain. Medication may causes sedation, constipation. Not to operate heavy machinery or drive on medication.

## 2025-04-25 NOTE — ASSESSMENT & PLAN NOTE
Orders:    cyclobenzaprine 10 MG Oral Tab; Take 1 tablet (10 mg total) by mouth in the morning and 1 tablet (10 mg total) before bedtime. As needed.    acetaminophen-codeine 300-30 MG Oral Tab; Take 1 tablet by mouth every 6 (six) hours as needed for Pain. Medication may causes sedation, constipation. Not to operate heavy machinery or drive on medication.

## 2025-04-25 NOTE — TELEPHONE ENCOUNTER
Patient's daughter Sonia called (on Release of Information), verified patient's Name and . States patient is going through a tough time, grieving from a very recent sudden loss of her son. PCP prescribed her medication to calm her down. However, also has been having issues with her back, \"osteo in the lower spine\" and has been having back pain issue. Taking Tylenol with no relief. Requesting prescription for something stronger to hold the patient until she is seen by Ortho on Monday. Advised to schedule an appointment for evaluation, verbalized understanding. Appointment scheduled.    Future Appointments   Date Time Provider Department Center   2025 10:40 AM Tete Mitchell APRN [facility]

## 2025-04-28 ENCOUNTER — TELEPHONE (OUTPATIENT)
Dept: ENDOCRINOLOGY CLINIC | Facility: CLINIC | Age: 85
End: 2025-04-28

## 2025-04-28 ENCOUNTER — OFFICE VISIT (OUTPATIENT)
Dept: ENDOCRINOLOGY CLINIC | Facility: CLINIC | Age: 85
End: 2025-04-28

## 2025-04-28 VITALS
DIASTOLIC BLOOD PRESSURE: 62 MMHG | BODY MASS INDEX: 22.87 KG/M2 | HEIGHT: 57 IN | WEIGHT: 106 LBS | SYSTOLIC BLOOD PRESSURE: 119 MMHG | HEART RATE: 77 BPM

## 2025-04-28 DIAGNOSIS — E55.9 VITAMIN D DEFICIENCY: ICD-10-CM

## 2025-04-28 DIAGNOSIS — M81.0 AGE-RELATED OSTEOPOROSIS WITHOUT CURRENT PATHOLOGICAL FRACTURE: Primary | ICD-10-CM

## 2025-04-28 PROCEDURE — 1160F RVW MEDS BY RX/DR IN RCRD: CPT | Performed by: INTERNAL MEDICINE

## 2025-04-28 PROCEDURE — 3078F DIAST BP <80 MM HG: CPT | Performed by: INTERNAL MEDICINE

## 2025-04-28 PROCEDURE — 3008F BODY MASS INDEX DOCD: CPT | Performed by: INTERNAL MEDICINE

## 2025-04-28 PROCEDURE — 3074F SYST BP LT 130 MM HG: CPT | Performed by: INTERNAL MEDICINE

## 2025-04-28 PROCEDURE — 1159F MED LIST DOCD IN RCRD: CPT | Performed by: INTERNAL MEDICINE

## 2025-04-28 PROCEDURE — 99203 OFFICE O/P NEW LOW 30 MIN: CPT | Performed by: INTERNAL MEDICINE

## 2025-04-28 NOTE — TELEPHONE ENCOUNTER
Please start PA for prolia     Fosamax is not a good option due to GI SE  She has had tooth extractions and has partial dentures, will like to avoid reclast   Patient. Daughter are interested in prolia    Thanks

## 2025-04-28 NOTE — H&P
New Patient Evaluation - History and Physical    CONSULT - Reason for Visit:  Osteoporosis   Requesting Physician: Amanda     CHIEF COMPLAINT:    Chief Complaint   Patient presents with    Consult     Osteoporosis  Pt denies any recent falls/fractures/ or dental work          HISTORY OF PRESENT ILLNESS:   Maria T Ferguson is a 84 year old female who presents with osteoporosis    Diagnosed   DXA 2017: osteoporosis  DXA 2011: osteopenia    Rx so far: none    Falls. Fractures: denies      Calcium and Vit d intake: ergocalciferol 50,000 units weekly   Chronic Steroid use,  PPI use, chronic pain medication use, Anti-epileptics use : denies  RA: no   Exercise: no   She is postmenopausal , surgical menopause at age 39/ 40  Maternal history of osteoporosis : unknown  History of kidney stones : No    Current Smoking No   Illicit drug use No   Excess Alcohol No  History of thyroid disease No   History of calcium problems: No   History of Malabsorption: No     No h/o radiation   Dental work: has had tooth removed, she has partial dentures    + acid reflux, on medication famotidine  CAD CVA: yes CVA 2018      She has alzheimers. Is here with daughter who also helps with translation     PAST MEDICAL HISTORY:   Past Medical History[1]    PAST SURGICAL HISTORY:   Past Surgical History[2]    CURRENT MEDICATIONS:    Current Medications[3]    ALLERGIES:  Allergies[4]    SOCIAL HISTORY:    Social Hx on file[5]    FAMILY HISTORY:   Family History[6]    ASSESSMENTS:     REVIEW OF SYSTEMS:  Constitutional: Negative for: weight change, fever, fatigue, cold/heat intolerance  Eyes: Negative for:  Visual changes, proptosis, blurring  ENT: Negative for:  dysphagia, neck swelling, dysphonia  Respiratory: Negative for:  dyspnea, cough  Cardiovascular: Negative for:  chest pain, palpitations, orthopnea  GI: Negative for:  abdominal pain, nausea, vomiting, diarrhea, constipation, bleeding  Neurology: Negative for: headache, numbness,  weakness  Genito-Urinary: Negative for: dysuria, frequency  Psychiatric: Negative for:  depression, anxiety  Hematology/Lymphatics: Negative for: bruising, lower extremity edema  Endocrine: Negative for: polyuria, polydypsia  Skin: Negative for: rash, blister, cellulitis,      PHYSICAL EXAM:   Vitals:    04/28/25 1306   BP: 119/62   Pulse: 77   Weight: 106 lb (48.1 kg)   Height: 4' 9\" (1.448 m)     BMI: Body mass index is 22.94 kg/m².       General Appearance:  alert, well developed, in no acute distress  Head: Atraumatic  Eyes:  normal conjunctivae, sclera., normal sclera and normal pupils  Throat/Neck: normal sound to voice. Normal hearing, normal speech  Respiratory:  Speaking in full sentences, non-labored. no increased work of breathing, no audible wheezing    Neuro: motor grossly intact, moving all extremities without difficulty  Psychiatric:  oriented to time, self, and place  Extremities: no obvious extremity swelling      DATA:     Pertinent data reviewed      ASSESSMENT AND PLAN:      Patient is a 84 year old female with a new diagnosis Osteoporosis.    Discussed Osteoporosis and osteopenia in detail including pathogenesis, risk factors and treatment options.   We discussed various treatment options, including anabolic vs resorptive.      Fosamax is not a good option due to GI SE  She has had tooth extractions and has partial dentures, will like to avoid reclast   Patient. Daughter are interested in prolia  Discussed medication   She will like to proceed     PLAN:     - Prolia verification  FDA label including SE and CI reviewed  Reading material provided  - Labs as below   - Discussed dietary intake of calcium  - Resistance exercises  - Fall precautions.      RTC for [place] for prolia, contact office if she has not heard about the prolia in 2-3 weeks   RTC in 6 months with me.        Orders Placed This Encounter   Procedures    PTH Intact with minerals    Vitamin D [E]    Alkaline Phosphatase, Bone Specific          4/28/2025  Jaky Rueda MD        Patient verbalized a complete  understanding of all of the above and did not have any further questions.          [1]   Past Medical History:   Age-related nuclear cataract of both eyes    Anxiety state, unspecified    CVA (cerebral vascular accident) (HCC)    minor    Headache    High blood pressure    Hypercholesteremia    Hypertension    Syncope    2D echo ventriular wall thickening    Type II or unspecified type diabetes mellitus without mention of complication, not stated as uncontrolled    Unspecified essential hypertension   [2]   Past Surgical History:  Procedure Laterality Date    Cataract extraction w/  intraocular lens implant Left 1/11/16    [facility]    Cataract extraction w/  intraocular lens implant Right 2/1/16    [facility]    Colonoscopy N/A 7/20/2017    Procedure: COLONOSCOPY;  Surgeon: Grayson Ortiz MD;  Location: [facility] ENDOSCOPY    Hysterectomy  1980    Tube insertion  11/2018    Yag capsulotomy - od - right eye Right 01/17/2018    [facility]    Yag capsulotomy - os - left eye Left 01/24/2018    [facility]   [3]   Current Outpatient Medications   Medication Sig Dispense Refill    cyclobenzaprine 10 MG Oral Tab Take 1 tablet (10 mg total) by mouth in the morning and 1 tablet (10 mg total) before bedtime. As needed. 15 tablet 0    acetaminophen-codeine 300-30 MG Oral Tab Take 1 tablet by mouth every 6 (six) hours as needed for Pain. Medication may causes sedation, constipation. Not to operate heavy machinery or drive on medication. 15 tablet 0    diazePAM 2 MG Oral Tab Take 1 tablet (2 mg total) by mouth every 12 (twelve) hours as needed for Anxiety. 10 tablet 0    losartan 50 MG Oral Tab Take 1 tablet (50 mg total) by mouth 2 (two) times daily. 180 tablet 3    famotidine 20 MG Oral Tab Take 1 tablet (20 mg total) by mouth daily. 90 tablet 3    amLODIPine 5 MG Oral Tab Take 1 tablet (5 mg total) by mouth at bedtime. 90 tablet 3    atorvastatin 80 MG Oral Tab Take 1 tablet (80  mg total) by mouth nightly. 90 tablet 3    PARoxetine 10 MG Oral Tab Take 1 tablet (10 mg total) by mouth daily. 90 tablet 3    aspirin 325 MG Oral Tab Take 1 tablet (325 mg total) by mouth daily.      sennosides 8.6 MG Oral Tab 1 tablet (8.6 mg total) by Per G Tube route daily as needed.      magnesium 250 MG Oral Tab Take 1 tablet (250 mg total) by mouth.      LORATADINE OR Take by mouth daily as needed.      Nutritional Supplements (GLUCERNA) Oral Liquid Take 1 Dose by mouth daily as needed. 90 Bottle 1    Multiple Vitamin (MULTI-DAY VITAMINS OR) Take 1 tablet by mouth daily.      Cholecalciferol (VITAMIN D3) 3000 units Oral Tab Take 3,000 Units by mouth daily.       [4]   Allergies  Allergen Reactions    Lisinopril Coughing   [5]   Social History  Socioeconomic History    Marital status:    Tobacco Use    Smoking status: Never    Smokeless tobacco: Never   Vaping Use    Vaping status: Never Used   Substance and Sexual Activity    Alcohol use: No    Drug use: No   Other Topics Concern    Caffeine Concern Yes     Comment: coffee 1 cup    Exercise No    Pt has a pacemaker No    Pt has a defibrillator No    Reaction to local anesthetic No   [6]   Family History  Problem Relation Age of Onset    Diabetes Other     Hypertension Other     Lipids Other         hyperlipidemia    Breast Cancer Daughter 55    Glaucoma Neg     Macular degeneration Neg

## 2025-04-29 ENCOUNTER — NURSE TRIAGE (OUTPATIENT)
Dept: FAMILY MEDICINE CLINIC | Facility: CLINIC | Age: 85
End: 2025-04-29

## 2025-04-29 ENCOUNTER — TELEPHONE (OUTPATIENT)
Dept: INTERNAL MEDICINE CLINIC | Facility: CLINIC | Age: 85
End: 2025-04-29

## 2025-04-29 ENCOUNTER — TELEPHONE (OUTPATIENT)
Dept: FAMILY MEDICINE CLINIC | Facility: CLINIC | Age: 85
End: 2025-04-29

## 2025-04-29 ENCOUNTER — OFFICE VISIT (OUTPATIENT)
Dept: INTERNAL MEDICINE CLINIC | Facility: CLINIC | Age: 85
End: 2025-04-29

## 2025-04-29 VITALS
HEART RATE: 75 BPM | SYSTOLIC BLOOD PRESSURE: 118 MMHG | DIASTOLIC BLOOD PRESSURE: 73 MMHG | BODY MASS INDEX: 23.84 KG/M2 | TEMPERATURE: 97 F | HEIGHT: 56 IN | OXYGEN SATURATION: 98 % | RESPIRATION RATE: 16 BRPM | WEIGHT: 106 LBS

## 2025-04-29 DIAGNOSIS — M54.42 CHRONIC BILATERAL LOW BACK PAIN WITH BILATERAL SCIATICA: Primary | ICD-10-CM

## 2025-04-29 DIAGNOSIS — G89.29 CHRONIC BILATERAL LOW BACK PAIN WITH BILATERAL SCIATICA: Primary | ICD-10-CM

## 2025-04-29 DIAGNOSIS — M54.41 CHRONIC BILATERAL LOW BACK PAIN WITH BILATERAL SCIATICA: Primary | ICD-10-CM

## 2025-04-29 PROCEDURE — 1170F FXNL STATUS ASSESSED: CPT | Performed by: NURSE PRACTITIONER

## 2025-04-29 PROCEDURE — 1160F RVW MEDS BY RX/DR IN RCRD: CPT | Performed by: NURSE PRACTITIONER

## 2025-04-29 PROCEDURE — 1125F AMNT PAIN NOTED PAIN PRSNT: CPT | Performed by: NURSE PRACTITIONER

## 2025-04-29 PROCEDURE — 3078F DIAST BP <80 MM HG: CPT | Performed by: NURSE PRACTITIONER

## 2025-04-29 PROCEDURE — 3074F SYST BP LT 130 MM HG: CPT | Performed by: NURSE PRACTITIONER

## 2025-04-29 PROCEDURE — 1159F MED LIST DOCD IN RCRD: CPT | Performed by: NURSE PRACTITIONER

## 2025-04-29 PROCEDURE — 3008F BODY MASS INDEX DOCD: CPT | Performed by: NURSE PRACTITIONER

## 2025-04-29 PROCEDURE — 99213 OFFICE O/P EST LOW 20 MIN: CPT | Performed by: NURSE PRACTITIONER

## 2025-04-29 RX ORDER — LIDOCAINE 50 MG/G
1 PATCH TOPICAL EVERY 24 HOURS
Qty: 14 PATCH | Refills: 0 | Status: SHIPPED | OUTPATIENT
Start: 2025-04-29

## 2025-04-29 NOTE — TELEPHONE ENCOUNTER
PRIOR AUTH      Current Outpatient Medications:     lidocaine 5 % External Patch, Place 1 patch onto the skin daily., Disp: 14 patch, Rfl: 0

## 2025-04-29 NOTE — PROGRESS NOTES
Maria T Ferguson is a 84 year old female.  Chief Complaint   Patient presents with    Back Pain     HPI:   She presents to the office with her daughter with lower back pain. This is a chronic issue. Her son just passed away and she is under a lot of stress. Her pain has worsened because of this.     She did see Family Medicine APN on 4/25 and was prescribed tylenol #3 and flexeril for pain. Per her daughter the tylenol #3 did cause confusion. She did not try the flexeril.     She has had symptoms for several weeks.     She did see the endocrinologist yesterday for management of osteoporosis.   Current Medications[1]   Past Medical History[2]   Past Surgical History[3]   Social History:  Short Social Hx on File[4]   Family History[5]   Allergies[6]     REVIEW OF SYSTEMS:     Review of Systems   Constitutional:  Negative for fever.   HENT: Negative.     Respiratory:  Negative for cough, shortness of breath and wheezing.    Cardiovascular:  Negative for chest pain.   Gastrointestinal: Negative.    Genitourinary: Negative.    Musculoskeletal:  Positive for back pain.   Skin: Negative.    Neurological: Negative.    Psychiatric/Behavioral: Negative.        Wt Readings from Last 5 Encounters:   04/29/25 106 lb (48.1 kg)   04/28/25 106 lb (48.1 kg)   04/25/25 104 lb (47.2 kg)   11/04/24 110 lb (49.9 kg)   07/09/24 111 lb 15.9 oz (50.8 kg)     Body mass index is 23.76 kg/m².      EXAM:   /73 (BP Location: Right arm)   Pulse 75   Temp 97.2 °F (36.2 °C) (Temporal)   Resp 16   Ht 4' 8\" (1.422 m)   Wt 106 lb (48.1 kg)   SpO2 98%   BMI 23.76 kg/m²     Physical Exam  Vitals reviewed.   Constitutional:       Appearance: Normal appearance.   HENT:      Head: Normocephalic.   Cardiovascular:      Rate and Rhythm: Normal rate and regular rhythm.      Pulses: Normal pulses.   Pulmonary:      Breath sounds: Normal breath sounds. No wheezing.   Musculoskeletal:         General: No swelling.      Lumbar back: Decreased range of  motion.   Skin:     General: Skin is warm and dry.   Neurological:      Mental Status: She is alert and oriented to person, place, and time.   Psychiatric:         Mood and Affect: Mood normal.         Behavior: Behavior normal.            ASSESSMENT AND PLAN:   1. Chronic bilateral low back pain with bilateral sciatica  - stop using tylenol #3 due to having confusion  - ok to continue tylenol ES every 8 hours as needed for pain  - add lidocaine patches and follow up with pain management for possible steroid injection.   - lidocaine 5 % External Patch; Place 1 patch onto the skin daily.  Dispense: 14 patch; Refill: 0  - Pain Management Referral - [facility]      The patient indicates understanding of these issues and agrees to the plan.  Return for if symptoms do not resolve.       [1]   Current Outpatient Medications   Medication Sig Dispense Refill    lidocaine 5 % External Patch Place 1 patch onto the skin daily. 14 patch 0    diazePAM 2 MG Oral Tab Take 1 tablet (2 mg total) by mouth every 12 (twelve) hours as needed for Anxiety. 10 tablet 0    losartan 50 MG Oral Tab Take 1 tablet (50 mg total) by mouth 2 (two) times daily. 180 tablet 3    famotidine 20 MG Oral Tab Take 1 tablet (20 mg total) by mouth daily. 90 tablet 3    amLODIPine 5 MG Oral Tab Take 1 tablet (5 mg total) by mouth at bedtime. 90 tablet 3    atorvastatin 80 MG Oral Tab Take 1 tablet (80 mg total) by mouth nightly. 90 tablet 3    PARoxetine 10 MG Oral Tab Take 1 tablet (10 mg total) by mouth daily. 90 tablet 3    aspirin 325 MG Oral Tab Take 1 tablet (325 mg total) by mouth daily.      sennosides 8.6 MG Oral Tab 1 tablet (8.6 mg total) by Per G Tube route daily as needed.      magnesium 250 MG Oral Tab Take 1 tablet (250 mg total) by mouth.      LORATADINE OR Take by mouth daily as needed.      Nutritional Supplements (GLUCERNA) Oral Liquid Take 1 Dose by mouth daily as needed. 90 Bottle 1    Multiple Vitamin (MULTI-DAY VITAMINS OR) Take  1 tablet by mouth daily.      Cholecalciferol (VITAMIN D3) 3000 units Oral Tab Take 3,000 Units by mouth daily.       [2]   Past Medical History:   Age-related nuclear cataract of both eyes    Anxiety state, unspecified    CVA (cerebral vascular accident) (HCC)    minor    Headache    High blood pressure    Hypercholesteremia    Hypertension    Syncope    2D echo ventriular wall thickening    Type II or unspecified type diabetes mellitus without mention of complication, not stated as uncontrolled    Unspecified essential hypertension   [3]   Past Surgical History:  Procedure Laterality Date    Cataract extraction w/  intraocular lens implant Left 1/11/16    [facility]    Cataract extraction w/  intraocular lens implant Right 2/1/16    [facility]    Colonoscopy N/A 7/20/2017    Procedure: COLONOSCOPY;  Surgeon: Grayson Ortiz MD;  Location: [facility] ENDOSCOPY    Hysterectomy  1980    Tube insertion  11/2018    Yag capsulotomy - od - right eye Right 01/17/2018    [facility]    Yag capsulotomy - os - left eye Left 01/24/2018    [facility]   [4]   Social History  Socioeconomic History    Marital status:    Tobacco Use    Smoking status: Never    Smokeless tobacco: Never   Vaping Use    Vaping status: Never Used   Substance and Sexual Activity    Alcohol use: No    Drug use: No   Other Topics Concern    Caffeine Concern Yes     Comment: coffee 1 cup    Exercise No    Pt has a pacemaker No    Pt has a defibrillator No    Reaction to local anesthetic No   Social History Narrative    The patient does not use an assistive device..      The patient does live in a home with stairs.      Social Drivers of Health     Food Insecurity: No Food Insecurity (6/22/2024)    Food Insecurity     Food Insecurity: Never true   Transportation Needs: No Transportation Needs (6/22/2024)    Transportation Needs     Lack of Transportation: No   Housing Stability: Low Risk  (6/22/2024)    Housing Stability     Housing Instability: No   [5]   Family History  Problem  Relation Age of Onset    Diabetes Other     Hypertension Other     Lipids Other         hyperlipidemia    Breast Cancer Daughter 55    Glaucoma Neg     Macular degeneration Neg    [6]   Allergies  Allergen Reactions    Lisinopril Coughing

## 2025-04-29 NOTE — TELEPHONE ENCOUNTER
I have no openings today  Ill keep any eye out if anyone cancels otherwise would advise her to see another provider available today or [facility] unless something opens up for me

## 2025-04-29 NOTE — TELEPHONE ENCOUNTER
Dr. Patel - please advise if you can add patient to your schedule anytime today [except 2-230 pm]    Please reply to pool: EM RN TRIAGE    Action Requested: Summary for Provider     []  Critical Lab, Recommendations Needed  [x] Need Additional Advice  []   FYI    []   Need Orders  [] Need Medications Sent to Pharmacy  []  Other     SUMMARY: Lower back pain is 10/10; chronic and worsened with recent loss of son. Same day office visit advised, but no visits with PCP today--> agreeable and can come into office anytime Dr. Patel advises [except from 2 - 230 pm] and it takes 10 minutes to commute to office. [See below for more details]     Reason for call: Back Pain  Onset: chronic, exacerbated on 4/23/25    Reason for Disposition   SEVERE back pain (e.g., excruciating, unable to do any normal activities) and not improved after pain medicine and CARE ADVICE    Protocols used: Back Pain-A-[place]      Sonia/daughter (Last signed Verbal Release verified) called states she as severe lower back pain--->10/10. She also states patient lost her son recently, which has exacerbated her pain with crying and stress. Patient has tried applying OTC tiger balm/topical, she was previously prescribed tylenol with codeine, but it was confusing her as patient has Alzheimer's and adds to her confusion so daughter had patient stop it. She is asking for alternate pain relief for patient, lidocaine patches. Refer to system/assessment yes/no answers. Same day office visit advised, but no visits with PCP today--> agreeable and can come into office anytime Dr. Patel advises [except from 2 - 230 pm] and it takes 10 minutes to commute to office. Home Care Advice discussed, per protocol. Patient's daughter was instructed if patient has any new or worsening symptoms [reviewed] seek immediate medical attention-->Immediate Care/Emergency Department. I made her aware I will convey the above to Dr. Patel. She verbalized understanding. No further questions or  concerns at this time.

## 2025-04-29 NOTE — PATIENT INSTRUCTIONS
Ok to continue heat therapy as needed for pain    Ok to increase tylenol to 1-2 tablets every 8 hours for pain    Ok to use lidocaine patch to help with pain

## 2025-04-29 NOTE — TELEPHONE ENCOUNTER
I called Sonia/daughter (Last signed Verbal Release verified) and made aware of Dr. Patel's interpretation and recommendations. She was agreeable in patient seeing APRN today--> scheduled. She verbalized understanding. No further questions or concerns at this time.    Future Appointments   Date Time Provider Department Center   4/29/2025 12:00 PM Amber Matthews APRN [facility]

## 2025-05-02 ENCOUNTER — OFFICE VISIT (OUTPATIENT)
Dept: PAIN CLINIC | Facility: HOSPITAL | Age: 85
End: 2025-05-02
Attending: NURSE PRACTITIONER
Payer: MEDICARE

## 2025-05-02 ENCOUNTER — LAB ENCOUNTER (OUTPATIENT)
Dept: LAB | Facility: HOSPITAL | Age: 85
End: 2025-05-02
Attending: NURSE PRACTITIONER
Payer: MEDICARE

## 2025-05-02 VITALS
OXYGEN SATURATION: 95 % | BODY MASS INDEX: 24 KG/M2 | SYSTOLIC BLOOD PRESSURE: 128 MMHG | HEART RATE: 77 BPM | DIASTOLIC BLOOD PRESSURE: 69 MMHG | WEIGHT: 106 LBS

## 2025-05-02 DIAGNOSIS — R31.21 ASYMPTOMATIC MICROSCOPIC HEMATURIA: ICD-10-CM

## 2025-05-02 DIAGNOSIS — M54.16 LUMBAR RADICULITIS: ICD-10-CM

## 2025-05-02 DIAGNOSIS — M47.816 LUMBAR FACET ARTHROPATHY: Primary | ICD-10-CM

## 2025-05-02 DIAGNOSIS — E55.9 VITAMIN D DEFICIENCY: ICD-10-CM

## 2025-05-02 DIAGNOSIS — M81.0 AGE-RELATED OSTEOPOROSIS WITHOUT CURRENT PATHOLOGICAL FRACTURE: ICD-10-CM

## 2025-05-02 LAB
BILIRUB UR QL: NEGATIVE
CALCIUM BLD-MCNC: 9 MG/DL (ref 8.7–10.4)
CREAT BLD-MCNC: 1.04 MG/DL (ref 0.55–1.02)
CREAT UR-SCNC: 56.1 MG/DL
GLUCOSE UR-MCNC: 200 MG/DL
HGB UR QL STRIP.AUTO: NEGATIVE
KETONES UR-MCNC: NEGATIVE MG/DL
LEUKOCYTE ESTERASE UR QL STRIP.AUTO: NEGATIVE
MICROALBUMIN UR-MCNC: <0.3 MG/DL
NITRITE UR QL STRIP.AUTO: NEGATIVE
PH UR: 6.5 [PH] (ref 5–8)
PHOSPHATE SERPL-MCNC: 2.6 MG/DL (ref 2.4–5.1)
PROT UR-MCNC: NEGATIVE MG/DL
PTH-INTACT SERPL-MCNC: 18.9 PG/ML (ref 18.5–88)
SP GR UR STRIP: 1.01 (ref 1–1.03)
UROBILINOGEN UR STRIP-ACNC: NORMAL
VIT D+METAB SERPL-MCNC: 76.1 NG/ML (ref 30–100)

## 2025-05-02 PROCEDURE — 99204 OFFICE O/P NEW MOD 45 MIN: CPT | Performed by: NURSE PRACTITIONER

## 2025-05-02 PROCEDURE — 84080 ASSAY ALKALINE PHOSPHATASES: CPT

## 2025-05-02 PROCEDURE — 83970 ASSAY OF PARATHORMONE: CPT

## 2025-05-02 PROCEDURE — 84100 ASSAY OF PHOSPHORUS: CPT

## 2025-05-02 PROCEDURE — 82310 ASSAY OF CALCIUM: CPT

## 2025-05-02 PROCEDURE — 82043 UR ALBUMIN QUANTITATIVE: CPT | Performed by: FAMILY MEDICINE

## 2025-05-02 PROCEDURE — 81001 URINALYSIS AUTO W/SCOPE: CPT

## 2025-05-02 PROCEDURE — 82306 VITAMIN D 25 HYDROXY: CPT

## 2025-05-02 PROCEDURE — 82565 ASSAY OF CREATININE: CPT

## 2025-05-02 PROCEDURE — 87086 URINE CULTURE/COLONY COUNT: CPT | Performed by: FAMILY MEDICINE

## 2025-05-02 PROCEDURE — 82570 ASSAY OF URINE CREATININE: CPT | Performed by: FAMILY MEDICINE

## 2025-05-02 PROCEDURE — 36415 COLL VENOUS BLD VENIPUNCTURE: CPT

## 2025-05-02 RX ORDER — METHYLPREDNISOLONE 4 MG/1
TABLET ORAL
Qty: 21 TABLET | Refills: 0 | Status: SHIPPED | OUTPATIENT
Start: 2025-05-02

## 2025-05-02 NOTE — PATIENT INSTRUCTIONS
Refill policies:    Allow 2-3 business days for refills; controlled substances may take longer.  Contact your pharmacy at least 5 days prior to running out of medication and have them send an electronic request or submit request through the “request refill” option in your [employer] account.  Refills are not addressed on weekends; covering physicians do not authorize routine medications on weekends.  No narcotics or controlled substances are refilled after noon on Fridays or by on call physicians.  By law, narcotics must be electronically prescribed.  A 30 day supply with no refills is the maximum allowed.  If your prescription is due for a refill, you may be due for a follow up appointment.  To best provide you care, patients receiving routine medications need to be seen at least once a year.  Patients receiving narcotic/controlled substance medications need to be seen at least once every 3 months.  In the event that your preferred pharmacy does not have the requested medication in stock (e.g. Backordered), it is your responsibility to find another pharmacy that has the requested medication available.  We will gladly send a new prescription to that pharmacy at your request.    Scheduling Tests:    If your physician has ordered radiology tests such as MRI or CT scans, please contact Central Scheduling at [phone] right away to schedule the test.  Once scheduled, the [facility] Centralized Referral Team will work with your insurance carrier to obtain pre-certification or prior authorization.  Depending on your insurance carrier, approval may take 3-10 days.  It is highly recommended patients assure they have received an authorization before having a test performed.  If test is done without insurance authorization, patient may be responsible for the entire amount billed.      Precertification and Prior Authorizations:  If your physician has recommended that you have a procedure or additional testing performed the [facility]  Centralized Referral Team will contact your insurance carrier to obtain pre-certification or prior authorization.    You are strongly encouraged to contact your insurance carrier to verify that your procedure/test has been approved and is a COVERED benefit.  Although the [facility] Centralized Referral Team does its due diligence, the insurance carrier gives the disclaimer that \"Although the procedure is authorized, this does not guarantee payment.\"    Ultimately the patient is responsible for payment.   Thank you for your understanding in this matter.  Paperwork Completion:  If you require FMLA or disability paperwork for your recovery, please make sure to either drop it off or have it faxed to our office at [phone]. Be sure the form has your name and date of birth on it.  The form will be faxed to our Forms Department and they will complete it for you.  There is a 25$ fee for all forms that need to be filled out.  Please be aware there is a 10-14 day turnaround time.  You will need to sign a release of information (ARLETTE) form if your paperwork does not come with one.  You may call the Forms Department with any questions at [phone].  Their fax number is [phone].

## 2025-05-02 NOTE — CHRONIC PAIN
Interventional Pain Medicine New Patient     CC: LBP    HPI: Patient is a 84 year old female who presents for initial evaluation and treatement of back pain that is non radiating. Pt has been having LBO for some years now, however, recently has been exacerbated. She denies any falls or trauma. Pt has tried tylenol #3 however, caused confusion. She also has tried flexeril with no relief.  Pt has alzheimers  and daughter is present with her.   Pt also going through difficult emotional time, son passed away, have  this weekend.  The pain described as dull, is rated as a 7/10 on a NRS. At its best the pain is a 4/10 and at its worst the pain is a 10/10.  The pain is constant, pain worse with standing/increased movement, better with heat, lying down/rest. Pain is NOT associated with tingling/numbness/weakness/cramping. Pain symptoms have affected patient's sleep and physical activity    Functional Limitation: unable to stand for periods of time, 2/2 pain    Previous treatment includes:   Physical therapy: yes, HEP  Injections none  Surgery none  Medications: Lidocaine patches   The patient specifically denies weight loss, changes in bathroom habits, fever, or chills.    Current Medications[1]    Allergies[2]    Past Medical History[3]    Problem List[4]    Past Surgical History[5]    Social History     Socioeconomic History    Marital status:      Spouse name: Not on file    Number of children: Not on file    Years of education: Not on file    Highest education level: Not on file   Occupational History    Not on file   Tobacco Use    Smoking status: Never    Smokeless tobacco: Never   Vaping Use    Vaping status: Never Used   Substance and Sexual Activity    Alcohol use: No    Drug use: No    Sexual activity: Not on file   Other Topics Concern     Service Not Asked    Blood Transfusions Not Asked    Caffeine Concern Yes     Comment: coffee 1 cup    Occupational Exposure Not Asked    Hobby Hazards  Not Asked    Sleep Concern Not Asked    Stress Concern Not Asked    Weight Concern Not Asked    Special Diet Not Asked    Back Care Not Asked    Exercise No    Bike Helmet Not Asked    Seat Belt Not Asked    Self-Exams Not Asked    Grew up on a farm Not Asked    History of tanning Not Asked    Outdoor occupation Not Asked    Pt has a pacemaker No    Pt has a defibrillator No    Breast feeding Not Asked    Reaction to local anesthetic No   Social History Narrative    The patient does not use an assistive device..      The patient does live in a home with stairs.      Social Drivers of Health     Food Insecurity: No Food Insecurity (6/22/2024)    Food Insecurity     Food Insecurity: Never true   Transportation Needs: No Transportation Needs (6/22/2024)    Transportation Needs     Lack of Transportation: No     Car Seat: Not on file   Housing Stability: Low Risk  (6/22/2024)    Housing Stability     Housing Instability: No     Housing Instability Emergency: Not on file     Crib or Bassinette: Not on file       ROS:  Constitutional: denies weight loss, night sweats, fatigue  Eyes: denies visual changes, headache, eye pain, double vision  Ears, nose, mouth, and throat: denies runny nose, frequent nose bleeds, stuffy ears, ear pain, gingival bleeding, sore throat, gingival bleeding  Cardiovascular: denies chest pain, shortness of breath, orthopnea, palpitations, loss of consciousness  Respiratory: denies cough, sputum, wheezing, shortness of breath  Gastrointestinal: denies abdominal pain, bloating, cramping, nausea/vomitting, constipation  Musculoskeletal: denies joint swelling, crepitus, arthritis. + pain  Integumentary: denies pruritis, rashes, lesions, excessive dryness and/or discoloration  Neurological: denies headache, weakness, numbness, pins and needles  Psychiatric: denies depression, changes in sleep patterns, anxiety, difficulty concentrating  Endocrine: denies tremor, sweaty, slow, tired, polydipsia,  polyuria  Hematologic/Lymphatic: denies gum bleeding, prolonged/excessive bleeding, petechiae  Allergic/Immunologic: denies difficulty breathing, swelling at the neck/groin       RADIOLOGY REPORTS:   Narrative   PROCEDURE: XR LUMBAR SPINE (MIN 2 VIEWS) (CPT=72100)     COMPARISON: [facility], XR LUMBAR SPINE (MIN 2 VIEWS) (CPT=72100), 6/06/2018, 7:01 PM.     INDICATIONS: Chronic low back pain.   No knwon injury.     TECHNIQUE: Lumbar spine radiographs (2-3 views)       FINDINGS:  There is diffuse osseous demineralization, which limits sensitivity for detection of osseous pathology.     ALIGNMENT: Mild levoscoliosis.  Grade 1 spondylolisthesis of L5 on S1 (8 mm, previously 6 mm).  VERTEBRAL BODIES:   Intact.  DISC SPACES: Multilevel loss of intervertebral disc height, most notably at L1-2, where it is mild-to-moderate.  There is associated multilevel endplate sclerosis with osteophyte formation.  Findings are slightly progressed compared to 2018.  Facet joints:  Moderate to advanced facet arthropathy at L5-S1.  SACROILIAC JOINTS: Normal.    OTHER: Loop recorder device projecting over the left thorax, partially imaged.              Impression   CONCLUSION:     Mild levoscoliosis with multilevel degenerative disease of the spine, mildly progressed from 2018.     Mild anterolisthesis of L5 on S1, slightly progressed from 2018, with associated moderate to severe facet arthropathy at this level again noted.        Demineralization        Dictated by (CST): Urszula Reza MD on 7/22/2024 at 3:27 PM      Finalized by (CST): Urszula Reza MD on 7/22/2024 at 3:30 PM        PHYSICAL EXAM:  There were no vitals taken for this visit.    The patient is in no distress. The patient is alert and oriented times three.  Mood and affect are normal.  Examination of the patient's skin and nails is grossly normal.  HEENT: Head is normocephalic, nontraumatic, no pinpoint pupils  CV: regular rate, no pedal  edema  Resp: no audible wheezing, normal respiratory effort  Abdomen: Soft, nondistended    Gait: mildlyantalgic, assistance with ambulation: none  Lumbar: Normal lordosis    Positive  point tenderness above the right L3-S1 facet joints.  Positive  point tenderness above the left L3-S1 facet joints.  Negative  right SI tenderness.  Negative  left SI tenderness.  Negative  bilateral greater trochanteric bursa tenderness.  Positive  straight leg raise testing on right at 10 degrees as it recreates the radicular symptoms.  Positive  straight leg raise testing on left at 10 degrees as it recreates the radicular symptoms  2+/4 patellar and achilles reflexes on the right  2+/4 patellar and achilles reflexes on the left  5/5 right lower extremity strength   5/5 left lower extremity strength   Sensation is grossly intact to light touch bilateral lower extremities    ASSESSMENT:  Lumbar facet Arthropathy   Left trigger finger   DM, controlled     The patient has failed multiple conservative treatments with PO NSAIDS and PT for > 6 weeks and is currently with pain that is preventing her from performing ADL. I recommend proceeding with a lumbar MRI to help determine if pt would benefit for Lumbar MBB, and later on RFA to help to decrease inflammation and pain to facilitate physical therapy and improve patient's functional status.     [place] Prescription Monitoring Program reviewed.    PLAN:  1. Injection Recommended: Lumbar MBB after MRI completed   2. Anticoagulation: ASA 325mg PO QD  3. Imaging: MRI LS  4. Physical Therapy: cont w HEP  5. Medications: medrol dose pack, dw daughter to monitor blood glucose, pt unable to tolerate opioid   6. Follow up: after MRI     A total of 46 minutes were spent face-to-face with the patient during this encounter and over half of that time was spent on counseling and coordination of care. We discussed in depth the importance of weight loss and exercise which includes physical therapy. I  also educated the patient about lifestyle modifications and proper body lifting mechanics.    IVONE Ibrahim  Interventional Pain Management         [1]   Current Outpatient Medications   Medication Sig Dispense Refill    lidocaine 5 % External Patch Place 1 patch onto the skin daily. 14 patch 0    diazePAM 2 MG Oral Tab Take 1 tablet (2 mg total) by mouth every 12 (twelve) hours as needed for Anxiety. 10 tablet 0    losartan 50 MG Oral Tab Take 1 tablet (50 mg total) by mouth 2 (two) times daily. 180 tablet 3    famotidine 20 MG Oral Tab Take 1 tablet (20 mg total) by mouth daily. 90 tablet 3    amLODIPine 5 MG Oral Tab Take 1 tablet (5 mg total) by mouth at bedtime. 90 tablet 3    atorvastatin 80 MG Oral Tab Take 1 tablet (80 mg total) by mouth nightly. 90 tablet 3    PARoxetine 10 MG Oral Tab Take 1 tablet (10 mg total) by mouth daily. 90 tablet 3    aspirin 325 MG Oral Tab Take 1 tablet (325 mg total) by mouth daily.      sennosides 8.6 MG Oral Tab 1 tablet (8.6 mg total) by Per G Tube route daily as needed.      magnesium 250 MG Oral Tab Take 1 tablet (250 mg total) by mouth.      LORATADINE OR Take by mouth daily as needed.      Nutritional Supplements (GLUCERNA) Oral Liquid Take 1 Dose by mouth daily as needed. 90 Bottle 1    Multiple Vitamin (MULTI-DAY VITAMINS OR) Take 1 tablet by mouth daily.      Cholecalciferol (VITAMIN D3) 3000 units Oral Tab Take 3,000 Units by mouth daily.       [2]   Allergies  Allergen Reactions    Lisinopril Coughing   [3]   Past Medical History:   Age-related nuclear cataract of both eyes    Anxiety state, unspecified    CVA (cerebral vascular accident) (HCC)    minor    Headache    High blood pressure    Hypercholesteremia    Hypertension    Syncope    2D echo ventriular wall thickening    Type II or unspecified type diabetes mellitus without mention of complication, not stated as uncontrolled    Unspecified essential hypertension   [4]   Patient Active Problem  List  Diagnosis    Hypercholesteremia    Essential hypertension with goal blood pressure less than 140/90    Type 2 diabetes mellitus without retinopathy (HCC)    Osteoporosis    Primary osteoarthritis involving multiple joints    History of CVA (cerebrovascular accident)    Chronic bilateral low back pain with bilateral sciatica    Dementia without behavioral disturbance (HCC)   [5]   Past Surgical History:  Procedure Laterality Date    Cataract extraction w/  intraocular lens implant Left 1/11/16    [facility]    Cataract extraction w/  intraocular lens implant Right 2/1/16    [facility]    Colonoscopy N/A 7/20/2017    Procedure: COLONOSCOPY;  Surgeon: Grayson Ortiz MD;  Location: [facility] ENDOSCOPY    Hysterectomy  1980    Tube insertion  11/2018    Yag capsulotomy - od - right eye Right 01/17/2018    [facility]    Yag capsulotomy - os - left eye Left 01/24/2018    [facility]

## 2025-05-02 NOTE — PROGRESS NOTES
Patient presents in office today with reported pain in mid to lower back, L trigger finger         Current pain level reported = 10/10    Last reported dose of Tylenol last night       Narcotic Contract renewal new patient     05.02.25-NP LBP w/Sciatica-Matthews-Cook     Pain increases with stress, ADL's, standing, walking, sitting and activity     Has patient been flagged as fall risk:   Yes    TOP FALL PREVENTION TIPS    INSIDE YOUR HOME    KITCHEN:  Use non skid mats only.    Clean up spills as soon as they happen.  Keep objects that you use often within easy reach.  BATHROOM:  Install grab bars on the bathroom walls beside the tub, shower and toilet.  Use a non skid rubber mat in the tub/shower.  If you are unsteady on your feet you may want to use a shower chair/bench and a hand held shower head while bathing/showering.  BEDROOM:  Place light switches within reach of your bed and a night light between the bedroom and bathroom.  Get up slowly from lying down or sitting if you get dizzy.  Keep a working flashlight near your bed.  STAIRS:  Keep stairwells well lit with light switches at top and bottom.  Install sturdy handrails on both sides.  Make sure carpeting is secure.  FLOORS:  Remove all loose wires, cords and throw rugs.  Keep floors clear of clutter.  Make sure carpets and area rugs have skid proof backing.  Do not use slippery wax on bare floors.  Keep furniture in its accustomed place.   If you have pets, be careful that you don’t trip over them.    OUTSIDE SAFETY TIPS  Always wear good shoes with proper support and traction.  Always use hand rails on stairs and escalators.  Cover porch steps with gritty weather proof paint.  Pay attention to curbs and other changes in surfaces when out in the community.  Take care when walking on gravel or grassy surfaces.  Avoid walking on snowy or icy surfaces.  Use a cane or walker (indoors and out) if you are unsteady on your feet.

## 2025-05-07 LAB — ALKALINE PHOSPHATASE BONE SPECIFIC: 13.3 UG/L

## 2025-05-07 NOTE — TELEPHONE ENCOUNTER
Received SOB VIA FAX dated on 5/5/25    YES PA REQUIRED    OOPCOST; 0%    FACILITY FEE;    ADMIN FEE;0%

## 2025-05-07 NOTE — TELEPHONE ENCOUNTER
Medication  CGM  pump supply Requested: (Denosumab) Prolia     CoverMyMeds Used: No    Key: N/a    Sig: Inject 60mg/ mL into the skin every 6 months.     DX Code: M81.0    Case Number/Pending Ref#: N/a      Routing over to PA Dept for assistance, thanks.

## 2025-05-09 NOTE — TELEPHONE ENCOUNTER
PA for Prolia submitted and approved via [facility] online, authorization number [id number], valid 5/9/25-5/9/26.

## 2025-05-20 NOTE — PLAN OF CARE
Patient contacted.    Problem: Patient Centered Care  Goal: Patient preferences are identified and integrated in the patient's plan of care  Interventions:  - What would you like us to know as we care for you?  Swazi speaking  - Provide timely, complete, and accurate informati assist patient to increase activity and self care with guidance from PT/OT  - Encourage visually impaired, hearing impaired and aphasic patients to use assistive/communication devices  Outcome: Progressing      Problem: PAIN - ADULT  Goal: Verbalizes/displ learning needs (meds, wound care, etc)  - Arrange for interpreters to assist at discharge as needed  - Consider post-discharge preferences of patient/family/discharge partner  - Complete POLST form as appropriate  - Assess patient's ability to be responsib

## 2025-06-24 ENCOUNTER — NURSE ONLY (OUTPATIENT)
Dept: ENDOCRINOLOGY CLINIC | Facility: CLINIC | Age: 85
End: 2025-06-24
Payer: COMMERCIAL

## 2025-06-24 DIAGNOSIS — E55.9 VITAMIN D DEFICIENCY: ICD-10-CM

## 2025-06-24 DIAGNOSIS — M81.0 AGE-RELATED OSTEOPOROSIS WITHOUT CURRENT PATHOLOGICAL FRACTURE: Primary | ICD-10-CM

## 2025-06-24 PROCEDURE — 96372 THER/PROPH/DIAG INJ SC/IM: CPT | Performed by: INTERNAL MEDICINE

## 2025-06-24 RX ORDER — LOSARTAN POTASSIUM 50 MG/1
50 TABLET ORAL 2 TIMES DAILY
Qty: 200 TABLET | Refills: 2 | OUTPATIENT
Start: 2025-06-24

## 2025-06-24 NOTE — PROGRESS NOTES
Patient seen today for prolia injection. Injection given to the left upper arm. Patient tolerated well.   used: Yes, daughter Sonia translated  Written information provided: Yes    Discussed most common side effects of: bone and muscle pain, joint pain, dizziness, headache. Side effects typically only last up to 1 week.     Call office or be seen in ER with any of the following severe side effects: signs of allergic reaction (hives, difficulty breathing, redness or swelling at injection site, chest pain, shortness of breath), low blood calcium, osteonecrosis of the jaw.    Discussed with the patient if he/she plans on having any major dental work done to make sure their dentist and endocrinology provider is aware that they are taking prolia prior to treatment. This is due to increased risk of osteonecrosis or infection of the jaw while on this medication.     Today this is the 1st injection.  Summary of benefits completed: Yes  PA required/completed: Yes; see TE 4/28/25  Last Prolia protocol labs: 5/2/25  Last Dexa scan: 1/8/24  Patient taking vitamin D supplementation: Yes, unsure of amount  Patient taking calcium supplementation: No     Patient scheduled for 6-month follow-up appt with Dr. Rueda on 12/26/25.   Next labs due: Prior to next injection  Future labs ordered: Yes      Staff message placed to MA pool ([facility] Medical Assistant) to perform repeat prolia insurance check in 5 months. -->Done

## 2025-06-24 NOTE — TELEPHONE ENCOUNTER
Duplicate Refill request/refill too soon.     losartan 50 MG - 1 year supply sent 11/06/24 to [facility]   
Prescription given to patient/guardian

## 2025-07-16 DIAGNOSIS — K21.9 GASTROESOPHAGEAL REFLUX DISEASE, UNSPECIFIED WHETHER ESOPHAGITIS PRESENT: ICD-10-CM

## 2025-07-22 RX ORDER — FAMOTIDINE 20 MG/1
20 TABLET, FILM COATED ORAL DAILY
Qty: 100 TABLET | Refills: 2 | Status: SHIPPED | OUTPATIENT
Start: 2025-07-22

## 2025-07-22 NOTE — TELEPHONE ENCOUNTER
Refill passed per Clinic protocol.  Requested Prescriptions   Pending Prescriptions Disp Refills    FAMOTIDINE 20 MG Oral Tab [Pharmacy Med Name: Famotidine 20 MG Oral Tablet] 100 tablet 2     Sig: TAKE 1 TABLET BY MOUTH DAILY       Gastrointestional Medication Protocol Passed - 7/22/2025  8:54 AM        Passed - In person appointment or virtual visit in the past 12 mos or appointment in next 3 mos     Recent Outpatient Visits              4 weeks ago Age-related osteoporosis without current pathological fracture    [facility]    Nurse Only    2 months ago Lumbar facet arthropathy    [facility] Maria Alejandra Cook APRN    Office Visit    2 months ago Chronic bilateral low back pain with bilateral sciatica    [facility] Amber Matthews APRN    Office Visit    2 months ago Age-related osteoporosis without current pathological fracture    [facility] Jaky Rueda MD    Office Visit    2 months ago Chronic bilateral low back pain with bilateral sciatica    [facility] Tete Mitchell APRN    Office Visit          Future Appointments         Provider Department Appt Notes    In 2 weeks [facility] MRI RM2 (3T WIDE) [facility] MRI     In 5 months Jaky Rueda MD [facility] Prolia and osteo follow-up                    Passed - Medication is active on med list

## 2025-08-05 RX ORDER — AMLODIPINE BESYLATE 5 MG/1
5 TABLET ORAL NIGHTLY
Qty: 100 TABLET | Refills: 2 | Status: SHIPPED | OUTPATIENT
Start: 2025-08-05

## 2025-08-06 ENCOUNTER — HOSPITAL ENCOUNTER (OUTPATIENT)
Dept: MRI IMAGING | Facility: HOSPITAL | Age: 85
Discharge: HOME | End: 2025-08-06
Attending: NURSE PRACTITIONER

## 2025-08-06 DIAGNOSIS — M47.816 LUMBAR FACET ARTHROPATHY: ICD-10-CM

## 2025-08-06 PROCEDURE — 72148 MRI LUMBAR SPINE W/O DYE: CPT | Performed by: NURSE PRACTITIONER

## 2025-08-10 DIAGNOSIS — E78.00 HYPERCHOLESTEREMIA: ICD-10-CM

## 2025-08-13 RX ORDER — ATORVASTATIN CALCIUM 80 MG/1
80 TABLET, FILM COATED ORAL NIGHTLY
Qty: 100 TABLET | Refills: 2 | OUTPATIENT
Start: 2025-08-13

## 2025-08-20 RX ORDER — LOSARTAN POTASSIUM 50 MG/1
50 TABLET ORAL 2 TIMES DAILY
Qty: 180 TABLET | Refills: 3 | Status: SHIPPED | OUTPATIENT
Start: 2025-08-20

## (undated) DEVICE — 1200CC GUARDIAN II: Brand: GUARDIAN

## (undated) DEVICE — ENDOSCOPY PACK - LOWER: Brand: MEDLINE INDUSTRIES, INC.

## (undated) DEVICE — FORCEP RADIAL JAW 4

## (undated) DEVICE — SNARE CAPTIFLEX MICRO-OVL OLY

## (undated) DEVICE — Device: Brand: DEFENDO AIR/WATER/SUCTION AND BIOPSY VALVE

## (undated) DEVICE — FILTERLINE NASAL ADULT O2/CO2

## (undated) DEVICE — ENDOSCOPY PACK UPPER: Brand: MEDLINE INDUSTRIES, INC.

## (undated) DEVICE — 3M™ RED DOT™ MONITORING ELECTRODE WITH FOAM TAPE AND STICKY GEL, 50/BAG, 20/CASE, 72/PLT 2570: Brand: RED DOT™

## (undated) NOTE — LETTER
3/10/2020    Patient: Elian Eng   MR Number: [id number]   YOB: 1940   Date of Visit: 3/10/2020   Physician: Kari Barros MD     Dear Medicare Patient:  Shivani Clemens BENJI BENEFICIARY:  Please know that while a refraction is impo

## (undated) NOTE — LETTER
AUTHORIZATION FOR SURGICAL OPERATION OR OTHER PROCEDURE    1.  I hereby authorize Dr. Juany Murrieta, and [address] [place] [address] staff assigned to my case to perform the following operation and/or procedure at the [address] [place] [address]:    ________________________ Patient Name:  ______________________________________________________  (please print)      Patient signature:  ___________________________________________________             Relationship to Patient:             Parent    Responsible person

## (undated) NOTE — ED AVS SNAPSHOT
Kateryna Mitchell   MRN: [id number]    Department:  [facility] Emergency Department   Date of Visit:  11/26/2019           Disclosure     Insurance plans vary and the physician(s) referred by the ER may not be covered by your plan.  Please contact y within the next three months to obtain basic health screening including reassessment of your blood pressure.     IF THERE IS ANY CHANGE OR WORSENING OF YOUR CONDITION, CALL YOUR PRIMARY CARE PHYSICIAN AT ONCE OR RETURN IMMEDIATELY TO THE EMERGENCY DEPARTMEN

## (undated) NOTE — LETTER
May 28, 2024      No Recipients     Patient: Maria T Ferguson   YOB: 1940   Date of Visit: 5/28/2024       Dear  Recipients:    Thank you for referring Maria T Ferguson to me for evaluation. Here is my assessment and plan of care:    Maria T Ferguson is a 84 year old female.    HPI:     HPI    Pt in today for a diabetic eye exam. Pt's daughter is here to translate for her. Daughter mentioned pt just had an eye exam about 6 months ago [facility] for a diabetic eye exam. Patient was prescribed new glasses @ [facility] (forgot to bring them today). Pt denies any ocular issues.       Pt has been a diabetic for 8 years       Pt's diabetes is controlled by diet control   Pt doesn't check BS   Pt's last blood sugar was 134 on 12/16/23  Last HA1C was 6.3 on 12/16/23  Endocrinologist: none  Last edited by Anupama Lynne OT on 5/28/2024  3:38 PM.        Patient History:  Past Medical History:    Age-related nuclear cataract of both eyes    Anxiety state, unspecified    CVA (cerebral vascular accident) (HCC)    minor    Headache    High blood pressure    Hypercholesteremia    Hypertension    Syncope    2D echo ventriular wall thickening    Type II or unspecified type diabetes mellitus without mention of complication, not stated as uncontrolled    Unspecified essential hypertension       Surgical History: Maria T Ferguson has a past surgical history that includes hysterectomy (1980); Cataract extraction w/  intraocular lens implant (Left, 1/11/16) (LILA); Cataract extraction w/  intraocular lens implant (Right, 2/1/16) (LILA); colonoscopy (N/A, 7/20/2017) (Procedure: COLONOSCOPY;  Surgeon: Grayson Ortiz MD;  Location: [facility] ENDOSCOPY); Yag Capsulotomy - OD - Right Eye (Right, 01/17/2018) (LILA); Yag Capsulotomy - OS - Left Eye (Left, 01/24/2018) (LILA); and Tube insertion (11/2018).    Family History   Problem Relation Age of Onset    Diabetes Other     Hypertension Other     Lipids Other          hyperlipidemia    Breast Cancer Daughter 55    Glaucoma Neg     Macular degeneration Neg        Social History:   Social History     Socioeconomic History    Marital status:    Tobacco Use    Smoking status: Never    Smokeless tobacco: Never   Vaping Use    Vaping status: Never Used   Substance and Sexual Activity    Alcohol use: No    Drug use: No   Other Topics Concern    Caffeine Concern Yes     Comment: coffee 1 cup    Exercise No    Pt has a pacemaker No    Pt has a defibrillator No    Reaction to local anesthetic No   Social History Narrative    The patient does not use an assistive device..      The patient does live in a home with stairs.        Medications:  Current Outpatient Medications   Medication Sig Dispense Refill    amLODIPine 5 MG Oral Tab Take 1 tablet (5 mg total) by mouth daily. 100 tablet 2    losartan 50 MG Oral Tab Take 1 tablet (50 mg total) by mouth 2 (two) times daily. 200 tablet 2    atorvastatin 80 MG Oral Tab Take 1 tablet (80 mg total) by mouth nightly. 90 tablet 3    famotidine 20 MG Oral Tab Take 1 tablet (20 mg total) by mouth daily. 90 tablet 3    metoprolol tartrate 50 MG Oral Tab Take 1 tablet (50 mg total) by mouth 2 (two) times daily. 180 tablet 3    PARoxetine 10 MG Oral Tab Take 1 tablet (10 mg total) by mouth daily. 90 tablet 3    magnesium 250 MG Oral Tab Take 1 tablet (250 mg total) by mouth.      PREVIDENT 5000 BOOSTER PLUS 1.1 % Dental Paste       LORATADINE OR Take by mouth.      Nutritional Supplements (GLUCERNA) Oral Liquid Take 1 Dose by mouth daily as needed. 90 Bottle 1    aspirin 325 MG Oral Tab 1 tablet (325 mg total) by Per G Tube route daily. 30 tablet 2    Senna 8.6 MG Oral Tab 1 tablet (8.6 mg total) by Per G Tube route 2 (two) times daily. (Patient taking differently: 1 tablet (8.6 mg total) by Per G Tube route daily.) 60 tablet 3    Multiple Vitamin (MULTI-DAY VITAMINS OR) Take 1 tablet by mouth daily.      Cholecalciferol (VITAMIN D3) 3000 units  Oral Tab Take 3,000 Units by mouth daily.           Allergies:  Allergies   Allergen Reactions    Lisinopril Coughing       ROS:     ROS    Positive for: Eyes  Last edited by Dara Ferguson OT on 5/28/2024  3:04 PM.          PHYSICAL EXAM:     Base Eye Exam       Visual Acuity (Snellen - Linear)         Right Left    Dist sc 20/60 20/60    Dist ph sc 20/50 NI    Near sc 20/50 20/50   Forgot new glasses  Patient does not want a refraction.               Tonometry (Icare, 3:18 PM)         Right Left    Pressure 13 10              Pupils         Pupils    Right PERRL    Left PERRL              Visual Fields         Left Right     Full Full              Extraocular Movement         Right Left     Full, Ortho Full, Ortho              Neuro/Psych       Oriented x3: Yes    Mood/Affect: Normal              Dilation       Both eyes: 1.0% Mydriacyl and 2.5% Emory Synephrine @ 3:20 PM              Dilation #2       Both eyes: 1.0% Mydriacyl and 2.5% Emory Synephrine @ 3:20 PM                  Slit Lamp and Fundus Exam       Slit Lamp Exam         Right Left    Lids/Lashes Dermatochalasis, Meibomian gland dysfunction Dermatochalasis, Meibomian gland dysfunction    Conjunctiva/Sclera Normal Normal    Cornea Clear Clear    Anterior Chamber Deep and quiet Deep and quiet    Iris Normal Normal    Lens PC IOL with YAG  PC IOL with YAG     Vitreous Vitreous floaters Clear              Fundus Exam         Right Left    Disc Good rim, Temporal crescent Good rim    C/D Ratio 0.45 0.45    Macula Normal- no BDR Normal- no BDR    Vessels Normal Normal    Periphery Normal Normal                  Refraction       Wearing Rx       Age: 3m    Type: Forgot new glasses   Patient does not want a refraction.                    ASSESSMENT/PLAN:     Diagnoses and Plan:     Type 2 diabetes mellitus without retinopathy (HCC)  Diet Controlled DM: no background of retinopathy, no signs of neovascularization noted.  Discussed ocular and systemic benefits  of blood sugar control.  Diagnosis and treatment discussed in detail with patient.    Will see patient in 1 year for a diabetic exam    Pseudophakia of both eyes  No treatment.     Floater, vitreous, right   There is no evidence of retinal pathology.  All signs and symptoms of retinal detachment/tears explained in detail.    Patient instructed to call the office if they experience increase in floaters, increase in flashes of light, loss of vision or curtain or veil effect.       No orders of the defined types were placed in this encounter.      Meds This Visit:  Requested Prescriptions      No prescriptions requested or ordered in this encounter        Follow up instructions:  Return in about 1 year (around 5/28/2025) for Diabetic eye exam.    5/28/2024  Scribed by: Armando Bee MD        If you have questions, please do not hesitate to call me. I look forward to following Maria T along with you.    Sincerely,        Armando Bee MD        CC:   No Recipients    Document electronically generated by: Armando Bee MD

## (undated) NOTE — MR AVS SNAPSHOT
[address] [facility] [address] [address]  [phone]  [phone]               Thank you for choosing us for your health care visit with Daniele Lucero PTA.   We are glad to serve you and happy to provide you with Feb 22, 2017  5:45 PM   Follow Up Visit with Yunior Ramos MD   [facility], [facility], [address] [facility] [place] [id number] [place] [phone]   [phone]            Feb 23, 2017  4:15 PM   [facility]

## (undated) NOTE — LETTER
5/20/2022    [address] [facility]            Dear Iqra Jenkins,      Our records indicate that you are due for an appointment for a Colonoscopy in July 2022, or sometime there after, with Karen Arceo MD.    Please call our office to schedule this appointment. Your medical well-being is important to us. If your insurance requires a referral, please call your primary care office to request one.       Thank you,      The Physicians and Staff at [facility]

## (undated) NOTE — LETTER
AUTHORIZATION FOR SURGICAL OPERATION OR OTHER PROCEDURE    1.  I hereby authorize Dr. Jamee Medina, and [facility] staff assigned to my case to perform the following operation and/or procedure at the [facility]:    ________________________ Patient Name:  ______________________________________________________  (please print)      Patient signature:  ___________________________________________________             Relationship to Patient:             Parent    Responsible person

## (undated) NOTE — LETTER
[address] [place]  [address]  Phone: [phone]  Fax: [phone]       Hello,     This is the [facility], office of Dr. Rebecca Patel.    Thank you for putting your trust in [facility].  Our goal is to deliver the highest quality healthcare and an exceptional patient experience. Review of your medical record shows you are due for the following:          Annual Medicare Physical         Please call [phone] to schedule your appointment or schedule online via [employer].     If you changed to a new provider at another facility, please notify the clinic to update your records.    If you had any recent testing at another facility, please have your results faxed to our office at [phone].     Thank you and have a great day!

## (undated) NOTE — LETTER
Lexis [address]iday [address] [address]  [place], [address] [facility]    Consent for Operation  Date: __________________                                Time: _______________    1.  I authorize the performance upon Baljit Isabel the following operation:  [place] revealed by the pictures or by descriptive texts accompanying them. If the procedure has been videotaped, the surgeon will obtain the original videotape. The hospital will not be responsible for storage or maintenance of this tape.   7. For the purpose of a THAT MY DOCTOR PROVIDED ME WITH THE ABOVE EXPLANATIONS, THAT ALL BLANKS OR STATEMENTS REQUIRING INSERTION OR COMPLETION WERE FILLED IN.     Signature of Patient:   ___________________________    When the patient is a minor or mentally incompetent to give co iii. All of the medicines I take (including prescriptions, herbal supplements, and pills I can buy without a prescription (including street drugs/illegal medications).  Failure to inform my anesthesiologist about these medicines may increase my risk of anes _____________________________________________________________________________  Anesthesiologist Signature     Date   Time  I have discussed the procedure and information above with the patient (or patient’s representative) and answered their questions.  The

## (undated) NOTE — MR AVS SNAPSHOT
[address] [facility] [address] [address]  [phone]  [phone]               Thank you for choosing us for your health care visit with Amada Broderick PT.   We are glad to serve you and happy to provide you with If you've recently had a stay at the Hospital you can access your discharge instructions in [employer] by going to Visits < Admission Summaries.  If you've been to the Emergency Department or your doctor's office, you can view your past visit information in My

## (undated) NOTE — LETTER
05/27/21        Nery Blades  [address] [facility] [address]      Dear Medina Orellana records indicate that you have outstanding lab work and or testing that was ordered for you and has not yet been completed:  Orders Placed This Encounter

## (undated) NOTE — ED AVS SNAPSHOT
Migdalia Contreras   MRN: [id number]    Department:  [facility] Emergency Department   Date of Visit:  12/12/2017           Disclosure     Insurance plans vary and the physician(s) referred by the ER may not be covered by your plan.  Please contact y within the next three months to obtain basic health screening including reassessment of your blood pressure.     IF THERE IS ANY CHANGE OR WORSENING OF YOUR CONDITION, CALL YOUR PRIMARY CARE PHYSICIAN AT ONCE OR RETURN IMMEDIATELY TO THE EMERGENCY DEPARTMEN

## (undated) NOTE — MR AVS SNAPSHOT
[facility] - [place] [address] [place]  [address]  [phone]               Thank you for choosing us for your health care visit with Glenny Patel MD.  We are glad to serve you and happy to provide you with this summary of yo Follow-up Instructions     Return in about 6 months (around 8/22/2017) for Chronic problems. Scheduling Instructions     Wednesday February 22, 2017     Imaging:  XR DEXA BONE DENSITOMETRY (CPT=77080)    Instructions:   To schedule a test at any authorization, such as [place], please feel free to schedule your appointment immediately. However, if you are unsure about the requirements for authorization, please wait 5-7 days and then contact your physician's office.  At that time, you will Commonly known as:  FREESTYLE LITE TEST           Losartan Potassium 50 MG Tabs   Take 1 tablet (50 mg total) by mouth 2 (two) times daily.    Commonly known as:  COZAAR           MetFORMIN HCl 500 MG Tabs   Take 1 tablet (500 mg total) by mouth daily with Visits < Visit Summaries. MyChart questions? Call [phone] for help. [employer] is NOT to be used for urgent needs. For medical emergencies, dial 911.            Visit [facility] Center[facility]rowdtap online at  [email or web address]

## (undated) NOTE — ED AVS SNAPSHOT
Kymberly Li   MRN: [id number]    Department:  [facility] Emergency Department   Date of Visit:  9/1/2017           Disclosure     Insurance plans vary and the physician(s) referred by the ER may not be covered by your plan.  Please contact you CARE PHYSICIAN AT ONCE OR RETURN IMMEDIATELY TO THE EMERGENCY DEPARTMENT. If you have been prescribed any medication(s), please fill your prescription right away and begin taking the medication(s) as directed.   If you believe that any of the medications

## (undated) NOTE — ED AVS SNAPSHOT
Sunday Ramsey   MRN: [id number]    Department:  [facility] Emergency Department   Date of Visit:  4/7/2018           Disclosure     Insurance plans vary and the physician(s) referred by the ER may not be covered by your plan.  Please contact you within the next three months to obtain basic health screening including reassessment of your blood pressure.     IF THERE IS ANY CHANGE OR WORSENING OF YOUR CONDITION, CALL YOUR PRIMARY CARE PHYSICIAN AT ONCE OR RETURN IMMEDIATELY TO THE EMERGENCY DEPARTMEN

## (undated) NOTE — MR AVS SNAPSHOT
[facility]  [address] [place] [address] [phone]               Thank you for choosing us for your health care visit with Dixon Lassiter. Waqas Williamson MD.  We are glad to serve you and happy to provide you with this summary of your visit. Metoprolol Succinate ER 50 MG Tb24   Take 1 tablet (50 mg total) by mouth once daily. Commonly known as: Toprol XL           PARoxetine HCl 10 MG Tabs   Take 1 tablet (10 mg total) by mouth every morning.    Commonly known as:  PAXIL           PEG 3350-

## (undated) NOTE — LETTER
[address] [place]  [address]  Phone: [phone]  Fax: [phone]     Ahmet,    Comfort communicado viene de la [facility], de la officina vinod Fernandez Amanda    Gloria, queremos agradecerle por poner hollis confianza en [facility]. Nuestra meta es darle la mas paddy calidad en cuidado de desmond y un experiencia excepcional hammad paciente de nuestra clinica. La revision de hollis historial medico demuestra que es tiempo de hacer las seguintes exminaciones:      Fisico anual de Medicare      Por Favor de llamar al [phone] para hacer hollis parish o puede usar hollis applicacion de [facility] para agendar hollis proxima parish.   Si cambio de provedor de desmond, por favor notifique a la clinica para actualizar richard registros.  Si realizo algun examen en otra clinica, por favor envie richard resultados por fax a nuestra oficina at [phone].     Donnie y le deseamos un maricel kathia !

## (undated) NOTE — IP AVS SNAPSHOT
Patient Demographics     Address  [facility] [address] Phone  [phone] [facility])  [phone] (Mobile) *Preferred* E-mail Address  [email or web address]      Emergency Contact(s)     Name Relation Home Work [id number] [facility] 1 tablet (325 mg total) by Per G Tube route daily. Floyd Braga MD         atorvastatin 80 MG Tabs  Commonly known as:  LIPITOR  Next dose due:  11-21-18 @2100      1 tablet (80 mg total) by Per G Tube route nightly.    Floyd Braga MD         Fluticas [id number] metoprolol Tartrate (LOPRESSOR) tab 50 mg 11/21/18 1845 Given      [id number] potassium chloride (K-SOL) 40 meq/30 ml (10%) oral solution 40 mEq 11/21/18 1620 Given            LEFT ARM     Order ID Medication Name Action Time Action Reason Commen Calcium, Total 8.3 8.3 - 10.3 mg/dL [facility]   Calculated Osmolality 299 275 - 295 mOsm/kg [facility]   GFR, Non- 76 >=60 — [facility]   GFR, -American 88 >=60 — [facility]   Comment:           Estimated GFR units: mL/min/1.73 Hosp Day # 0 PCP Rebecca Huff MD     Chief Complaint: rt sided weakness    History of Present Illness: Zoe Baig is a 69year old female with hx of DM, HTN, DL was last seen on Monday by family.  Pt was seen found by neighbors this am not able to speak Current Outpatient Medications on File Prior to Encounter:  PARoxetine HCl 10 MG Oral Tab Take 10 mg by mouth every morning. Disp:  Rfl:    Metoprolol Succinate  MG Oral Tablet 24 Hr Take 100 mg by mouth daily.  Disp:  Rfl:    Fluticasone Propionate 5 Neurologic: No focal neurological deficits. CNII-XII grossly intact. Musculoskeletal: Moves all extremities. Extremities: No edema or cyanosis. Integument: No rashes or lesions. Psychiatric: Appropriate mood and affect.       Diagnostic Data:      Labs filed at 2018  1:40 PM         [facility]  History and Physical     Elian Eng Patient Status:  Emergency    5/10/1940 MRN [id number]   Location [address] Attending Briana Crain MD   Hosp Day # 0 PCP Rebecca hyperlipidemia   • Glaucoma Neg    • Macular degeneration Neg        Allergies:   Lisinopril              Coughing    Medications:    No current facility-administered medications on file prior to encounter.    Current Outpatient Medications on File Joann Cardiovascular: S1, S2. Regular rate and rhythm. No murmurs, rubs or gallops. Equal pulses. Chest and Back: No tenderness or deformity. Abdomen: Soft, nontender, nondistended. Positive bowel sounds. No rebound, guarding or organomegaly.   Neurologic: No Filed:  11/19/2018  8:48 PM Date of Service:  11/19/2018  8:36 PM Status:  Signed    :  Sabrina Parham MD (Physician)     Consult Orders    1.  IP Consult to Gastroenterology Once [[id number]] ordered by Alexia Curling, MD at 11/19/18 1316 • Macular degeneration Neg       reports that  has never smoked. she has never used smokeless tobacco. She reports that she does not drink alcohol or use drugs.     Allergies:    Lisinopril              Coughing    Medications:    Current Facility-Administe •  atorvastatin (LIPITOR) tab 80 mg, 80 mg, Oral, Nightly  •  aspirin 300 MG rectal suppository 300 mg, 300 mg, Rectal, Daily **OR** aspirin tab 325 mg, 325 mg, Oral, Daily  •  Senna (SENOKOT) tab TABS 17.2 mg, 17.2 mg, Oral, Nightly  •  PEG 3350 (MIRALAX)  11/19/2018    K 3.5 11/19/2018     11/19/2018    CO2 25.0 11/19/2018     11/19/2018    CA 8.8 11/19/2018    MG 2.3 11/19/2018    PGLU 156 11/19/2018       Imaging:  MRI: 1.  There are scattered punctate and patchy foci of restricted dif Electronically signed by Beena Church MD on 11/19/2018  8:48 PM   Attribution Key    GP. 1 - Beena Church MD on 11/19/2018  8:36 PM                     D/C Summary     No notes of this type exist for this encounter.          Physical Therapy Note • CVA (cerebral vascular accident) [facility]) Jan 2015    minor   • Headache    • High blood pressure    • Hypercholesteremia    • Hypertension    • Syncope 2009    2D echo ventriular wall thickening   • Type II or unspecified type diabetes mellitus without men -   Moving from lying on back to sitting on the side of the bed?: A Little[NL.2]   How much help from another person does the patient currently need. .. [NL.1]   -   Moving to and from a bed to a chair (including a wheelchair)?: A Little   -   Need to walk i DISCHARGE RECOMMENDATIONS[NL.1]  PT Discharge Recommendations: Acute rehabilitation[NL.2]     PLAN[NL.1]  PT Treatment Plan: Bed mobility; Patient education; Family education;Gait training;Range of motion;Strengthening;Transfer training  Rehab Potential : Go 1. There are scattered punctate and patchy foci of restricted diffusion in the left frontal lobe, left parietal lobe, and left occipital lobe compatible with areas of acute ischemia/infarction.       2.  Mild chronic microvascular ischemic changes in the ce Weight Bearing Restriction: None[AR.2]                PAIN ASSESSMENT[AR.1]   Rating: Unable to rate  Location: soreness  Management Techniques:  Body mechanics;Repositioning[AR.2]    BALANCE[AR.1] activity for improved circulation.      THERAPEUTIC EXERCISES  Lower Extremity Ankle pumps  Glut sets  Hip AB/AD  Heel slides  LAQ  Quad sets     Upper Extremity none     Position Supine     Repetitions   10   Sets   1[AR.1]     Patient End of Session: Up i Goal #4  Pt will follow 50% of simple commands with reinforcement-met.   Upgrade to 100% multi-step verbal commands   Goal #5     Goal #6     Goal Comments: Goals established on 11/15/2018; all goals ongoing as of 11/20/2018    [AR.1]         Attribution Ke • CVA (cerebral vascular accident) [facility]) Jan 2015    minor   • Headache    • High blood pressure    • Hypercholesteremia    • Hypertension    • Syncope 2009    2D echo ventriular wall thickening   • Type II or unspecified type diabetes mellitus without men How much help from another person does the patient currently need. ..   -   Moving to and from a bed to a chair (including a wheelchair)?: A Little   -   Need to walk in hospital room?: A Little   -   Climbing 3-5 steps with a railing?: A Little       AM-PA side this session, though still continues to require cueing. Pt with improving activity tolerance. Pt will continue to benefit from IP skilled PT to address deficits and achieve max level of mobility.  Continue to recommend AR upon d/c from EH.[MD.2]     DI Presenting Problem: L frontal/parietal/occipital infarct[CC.2]    History related to current admission: Pt was admitted from home on 11/14 with R sided weakness and facial droop. Brain MRI showed acute infarct L frontal/parietal/occipital area.   History o Pt aphasic, is mumbling some responses to questions  Patient self-stated goal is unknown    OBJECTIVE[CC.1]  Precautions: Bed/chair alarm(R side inattention)[CC.2]    WEIGHT BEARING RESTRICTION[CC.1]  Weight Bearing Restriction: None[CC.2]    PAIN ASSESSME sitting:  Shoulder flexsion ~ 100 degrees, elbow flex/ext WFL, wrist ~30 flexion ~10 extension, incomplete composite grasp. Sit<> Stand X 8 at S level.   Functional ambulation for ~ 175 ft w/RW at [facility], pt veering R and L sides with physical assist, V/V/T c independence with ADLs. Recommend discharge to [facility] rehabilitation facility where pt can participate in a comprehensive and intensive therapy program.       **PHQ 9 - NOT APPROPRIATE AT THIS TIME.  Pt still cannot follow 1-step instructions at all ti :  Rowan Moore, SLP (SPEECH-LANGUAGE PATHOLOGIST)       REPEAT--ADULT VIDEOFLUOROSCOPIC SWALLOWING STUDY    Admission Date: 11/14/2018  Evaluation Date: 11/19/18  Radiologist: Dr. Rin Luna. 1]   Diet Recommendations - Solids: Reason for Referral: Stroke protocol[KS. 2]      Family/Patient Goals:  Safe swallowing    ASSESSMENT   DYSPHAGIA ASSESSMENT  Test completed in conjunction with Radiologist.[KS.1]  Patient Positioned: Upright;MAITE chair[KS.2]. [KS.1]  Patient Viewed: Valerio Lozano Goal #2 Pharyngeal strengthening exercises given max cues and model     In progress   Goal # 3    Communication eval as able       Goal # 4    Pt will tolerate therapeutic po trials purees and honey liquids with no csa within 3 sessions.   PLAN: Dysphagia t Aspiration Precautions: Upright position; Slow rate;Small bites and sips; No straw  Medication Administration Recommendations: Non-oral  Treatment Plan/Recommendations: Dysphagia therapy;Communication evaluation[KS. 2]    HISTORY   Background/Objective Inform Patient Alertness: Fully alert(nonverbal.  Expressive aphasia. ? apraxia)[KS.2]. [KS.1]  Consistencies Presented: Nectar thick liquids; Honey thick liquids;Puree[KS. 2] to assess oropharyngeal swallow function and assess for compensatory strategies to improve Effectiveness: Yes        Penetration Aspiration Scale Score: Score 7: Material enters the airway, passes below the vocal folds, and is not ejected from the trachea despite effort[KS. 2]       Overall Impression: Pt alert and sitting upright 90 degrees in M VFSS including Slow rate, Small bites, Small sips, Multiple swallows, Alternate liquids/solids, No straws, Upright 90 degrees, Liquids via tsp amount only, Eliminate distractions, Feed patient, Supervision with meals with max 1:1 assistance 100 % of the ti Name Date      Fluad 0.5ml 11/21/18     Fluad 0.5ml 09/27/17     INFLUENZA 12/21/16     INFLUENZA 12/08/15     Pneumococcal (Prevnar 13) 04/06/16     Pneumovax 23 03/20/09     Zoster Vaccine Live (Zostavax) 12/08/15

## (undated) NOTE — ED AVS SNAPSHOT
Mata Ching   MRN: [id number]    Department:  [facility] Emergency Department   Date of Visit:  9/30/2019           Disclosure     Insurance plans vary and the physician(s) referred by the ER may not be covered by your plan.  Please contact yo within the next three months to obtain basic health screening including reassessment of your blood pressure.     IF THERE IS ANY CHANGE OR WORSENING OF YOUR CONDITION, CALL YOUR PRIMARY CARE PHYSICIAN AT ONCE OR RETURN IMMEDIATELY TO THE EMERGENCY DEPARTMEN

## (undated) NOTE — LETTER
11/10/21        Rudolph Goddard  [address] [place] [address]      Dear Nixon Perkins records indicate that you have outstanding lab work and or testing that was ordered for you and has not yet been completed:  Orders Placed This Encounter

## (undated) NOTE — LETTER
March 10, 2020    Hiren Costa, [address] [facility]     Patient: Zeb Valencia   YOB: 1940   Date of Visit: 3/10/2020       Dear Dr. Pool Arteaga MD:    Thank you for referring Sugey Fischer to me for evaluation.  Here is my Capsulotomy - OS - Left Eye (Left, 01/24/2018) (LILA); and Tube insertion (11/2018).     Family History   Problem Relation Age of Onset   • Diabetes Other    • Hypertension Other    • Lipids Other         hyperlipidemia   • Glaucoma Neg    • Macular degenera • Fluticasone Propionate 50 MCG/ACT Nasal Suspension 2 sprays by Each Nare route daily. • Cholecalciferol (VITAMIN D3) 3000 units Oral Tab Take 3,000 Units by mouth daily.            Allergies:    Lisinopril              Coughing    ROS:       PHYSICAL [facility]-controlled [facility]  Diet controlled diabetes: no background of retinopathy, no signs of neovascularization noted. Discussed ocular and systemic benefits of blood sugar control.   Diagnosis and treatment discussed in detail with

## (undated) NOTE — LETTER
[facility] [address], [address]  Consent for Procedure/Sedation    Date: 11/21/2018    Time: 1400      1. I authorize the performance upon Reeves Paget the following:  Linq Insertion     2.  I authorize Dr. Sukumar Conde (and Traci Brantley ___________________    Witness: _________________________      Date: ___________________    Printed: 2018   2:01 PM  Patient Name: Gilford Harbour        : 5/10/1940       Medical Record #: [id number]

## (undated) NOTE — LETTER
ADULT VIDEOFLUOROSCOPIC SWALLOWING STUDY    Referring Physician: Dr. Galdino Laguerre  Diagnosis: dysphagia   Date of Service: 3/19/2019   Radiologist: Dr. Pooja Ames results and/or plan of treatment.     HISTORY   [facility] results: no recent CXR    ASSESSMENT   DYSPHAGIA ASSESSMENT  Test completed in conjunction with Radiologist.   Food/Liquid Types Presented: puree, solid, nectar thick liquid and thin liquids.     Study Position and View:  Patient was seated upright tolerance of advanced diet/liquids. The patient should also continue completing dysphagia exercises once per day for at least one month, however, she should complete all lingual exercises to improve lingual control of thin liquid bolus 3 times per day. Thank you for your referral. If you have any questions, please contact me at Dept: [phone]. Please co-sign or sign and return this letter via fax as soon as possible to No information on file. Arlette Bartlett MA/JESSICA-SLP  Speech Language Patho

## (undated) NOTE — MR AVS SNAPSHOT
Leo Kathleen [address] [facility] [address] [address]  [phone]  [phone]               Thank you for choosing us for your health care visit with Reggie Claudio PT.   We are glad to serve you and happy to provide you with [facility] ([address] [place] [address])    [address] [address]   [phone]           Please arrive at your scheduled appointment time. Wear comfortable, loose fitting clothing.             Feb 22, 201

## (undated) NOTE — MR AVS SNAPSHOT
Leo Kathleen 12 [facility] 43 [address]  [phone]  [phone]               Thank you for choosing us for your health care visit with Moira Isaac PTA.   We are glad to serve you and happy to provide you with Please arrive at your scheduled appointment time. Wear comfortable, loose fitting clothing.             Feb 28, 2017  4:15 PM   [facility] Visit By Therapist with Marissa Kasper, [address] [place] [address]

## (undated) NOTE — LETTER
January 11, 2018    [facility], [address] [place] [address]     Patient: Mata Ching   YOB: 1940   Date of Visit: 1/11/2018       Dear Dr. Marlee Perkins MD:    Thank you for referring Shashi Ramirez to me for evaluation.  Here is m Social History: Smoking status: Never Smoker                                                              Smokeless tobacco: Never Used                      Alcohol use:  No                Medications:    Current Outpatient Prescriptions:  loratadine 10 MG Blood Pressure Does not apply Kit With Adult cuff Disp: 1 kit Rfl: 0   aspirin 81 MG Oral Tab Take 1 tablet by mouth every other day.    Disp:  Rfl:    Blood Glucose Monitoring Suppl (FREESTYLE LITE) Does not apply Device Freestyle lite monitor Disp: 1 Radha Vessels Normal Normal    Periphery Normal Normal            Refraction     Wearing Rx       Sphere Cylinder Axis Add    Right -0.75 +1.25 175 2.75    Left -0.50 +1.25 175 2.75    Type:  Flat top bifocal          Manifest Refraction       Sphere Cylinder A

## (undated) NOTE — LETTER
Patient Name: Michelle Ba  YOB: 1940          MRN number:  [id number]  Date:  2/18/2019  Referring Physician: Loli Dia     ADULT SPEECH/SWALLOWING EVALUATION:    Referring Physician: Dr. Josephine Edwards  Oropharyngeal dysphagia   S/L cognitive dis Essential hypertension   Noted: 11/14/2018   Pure hypercholesterolemia (Chronic)   Noted: 11/14/2018   Cerebrovascular accident (CVA), unspecified mechanism ([facility]   Noted: 11/14/2018   Lipoma of torso   Noted: 6/13/2018   Chronic bilateral low back pain wi of he valleculae, impaired laryngeal closure at the height of the swallow with thin liquids via cup,  diminished pharyngeal stripping wave and impaired tongue base retraction resulting in trace residue on base of tongue and in the valleculae.      Impairme liquids. Increased oral transit as evidenced by reduced bolus formation. Pt's swallow response appears delayed with reduced hyolaryngeal elevation/excursion via palpation. Pt with immediate sneeze and delayed cough post trials of thin liquids via TSP.  Tria provided minimal support. 3. Pt will recall biographical/temporal information with 80% accuracy provided minimal support. 4. Pt will participate in 3 word mental manipulation lists with 80% accuracy provided minimal support.    5. Pt will use a memory b

## (undated) NOTE — MR AVS SNAPSHOT
Leo Kathleen 12 [facility] [address]  [phone]  [phone]               Thank you for choosing us for your health care visit with Sugey Cross PTA.   We are glad to serve you and happy to provide you with Please arrive at your scheduled appointment time. Wear comfortable, loose fitting clothing.             Mar 09, 2017  4:15 PM   Consult with Keara Roldan MD   [facility], [address] ([address])    [address] [place] [address]

## (undated) NOTE — LETTER
November 10, 2017    [address] [place] [address]      Dear Roxana Cowan:  It was a pleasure speaking with you over the phone recently regarding our Chronic Care Management program. To follow up, I wanted to send you some contact infor

## (undated) NOTE — IP AVS SNAPSHOT
[address]            (For Outpatient Use Only) Initial Admit Date: 11/14/2018   Inpt/Obs Admit Date: Inpt: 11/14/18 / Obs: N/A   Discharge Date:    Yany Colon:  [de-identified]   MRN: [de-identified]   CSN: [id number]        [id number]  MNZAS Subscriber ID:  Pt Rel to Subscriber:    Hospital Account Financial Class: Medicare Advantage    November 21, 2018

## (undated) NOTE — MR AVS SNAPSHOT
[facility] SPECIALTY hospitals - [place] [address] [address] [address]  [phone]               Thank you for choosing us for your health care visit with Moncia Parisi.  MD Amanda.  We are glad to serve you and happy to provide you with this summary of yo requirements for authorization, please wait 5-7 days and then contact your physician's office. At that time, you will be provided with any authorization numbers or be assured that none are required. You can then schedule your appointment.  Failure to obtain Commonly known as:  FREESTYLE LITE TEST           Losartan Potassium 50 MG Tabs   Take 1 tablet (50 mg total) by mouth 2 (two) times daily.    Commonly known as:  COZAAR           Metoprolol Succinate ER 50 MG Tb24   Take 1 tablet (50 mg total) by mouth onc

## (undated) NOTE — MR AVS SNAPSHOT
Leo Kathleen 12 [facility] [address] [address]  [phone]  [phone]               Thank you for choosing us for your health care visit with Jeanne Cao PT.   We are glad to serve you and happy to provide you with

## (undated) NOTE — Clinical Note
Thank you for the consult. I saw Ms Ferguson in the endocrine/diabetes clinic today. Please see attached my note. Please feel free to contact me with any questions. Thanks!

## (undated) NOTE — LETTER
Patient Name: Lore Guallpa  YOB: 1940          MRN number:  [id number]  Date:  11/1/2019  Referring Physician: Edmundo Corral    ADULT SWALLOWING EVALUATION:    Referring Physician: Dr. Daniele Gonzalez  Diagnosis: dysphagia     Date of Service signs of aspiration were noted during this exam.  Recommend continue general diet with thin liquids. G-tube is not needed or used at this time.      Swallowing FCM=6/7  Precautions:  Aspiration    OBJECTIVE:   ORAL MOTOR EXAMINATION  Dentition: scattered n If patient coughs more frequently with food or liquid, please contact your physician for referral for video swallow study. Patient was instructed to drink two or more additional cups of water per day when G-tube is removed.      Patient/Family was advise

## (undated) NOTE — ED AVS SNAPSHOT
Michelle Ba   MRN: [id number]    Department:  [facility] Emergency Department   Date of Visit:  8/31/2018           Disclosure     Insurance plans vary and the physician(s) referred by the ER may not be covered by your plan.  Please contact yo within the next three months to obtain basic health screening including reassessment of your blood pressure.     IF THERE IS ANY CHANGE OR WORSENING OF YOUR CONDITION, CALL YOUR PRIMARY CARE PHYSICIAN AT ONCE OR RETURN IMMEDIATELY TO THE EMERGENCY DEPARTMEN

## (undated) NOTE — LETTER
12/12/2017              [address] [facility]         Dear Lawson Pablo,    This letter is to inform you that our office has made several attempts to reach you by phone without success.   We were attempting to contact yo

## (undated) NOTE — LETTER
May 11, 2021    Ángela Mauricio, [address] [facility]     Patient: Mya Gaitan   YOB: 1940   Date of Visit: 5/11/2021       Dear Dr. Eduardo Abdul MD:    Thank you for referring Uzma Georgia to me for evaluation.  Here is my as Hypertension Other    • Lipids Other         hyperlipidemia   • Glaucoma Neg    • Macular degeneration Neg        Social History: Social History    Tobacco Use      Smoking status: Never Smoker      Smokeless tobacco: Never Used    Vaping Use      Vaping U Multiple Vitamin (MULTI-DAY VITAMINS OR) Take 1 tablet by mouth daily. • Cholecalciferol (VITAMIN D3) 3000 units Oral Tab Take 3,000 Units by mouth daily.            Allergies:    Lisinopril              Coughing    ROS:     ROS     Positive for: Endocr Final Rx       Sphere Cylinder [facility] Add [place]    Right -1.00 +1.00 015 20/30- +3.00 20/30    Left -0.25 +1.25 175 20/30- +3.00 20/30    Type: Flat top bifocal                 ASSESSMENT/PLAN:     Diagnoses and Plan:     Type 2 diabetes poncho